# Patient Record
Sex: MALE | Race: WHITE | NOT HISPANIC OR LATINO | Employment: OTHER | URBAN - METROPOLITAN AREA
[De-identification: names, ages, dates, MRNs, and addresses within clinical notes are randomized per-mention and may not be internally consistent; named-entity substitution may affect disease eponyms.]

---

## 2017-10-18 ENCOUNTER — HOSPITAL ENCOUNTER (OUTPATIENT)
Facility: MEDICAL CENTER | Age: 62
End: 2017-10-18
Attending: INTERNAL MEDICINE
Payer: COMMERCIAL

## 2017-10-18 LAB
C DIFF DNA SPEC QL NAA+PROBE: NEGATIVE
C DIFF TOX GENS STL QL NAA+PROBE: NEGATIVE

## 2017-10-18 PROCEDURE — 87493 C DIFF AMPLIFIED PROBE: CPT

## 2017-10-23 ENCOUNTER — HOSPITAL ENCOUNTER (OUTPATIENT)
Dept: LAB | Facility: MEDICAL CENTER | Age: 62
End: 2017-10-23
Attending: INTERNAL MEDICINE
Payer: COMMERCIAL

## 2017-10-23 LAB
CRP SERPL HS-MCNC: 0.75 MG/DL (ref 0–0.75)
ERYTHROCYTE [DISTWIDTH] IN BLOOD BY AUTOMATED COUNT: 45.1 FL (ref 35.9–50)
ERYTHROCYTE [SEDIMENTATION RATE] IN BLOOD BY WESTERGREN METHOD: 22 MM/HOUR (ref 0–20)
HCT VFR BLD AUTO: 44.3 % (ref 42–52)
HGB BLD-MCNC: 14.7 G/DL (ref 14–18)
MCH RBC QN AUTO: 32.7 PG (ref 27–33)
MCHC RBC AUTO-ENTMCNC: 33.2 G/DL (ref 33.7–35.3)
MCV RBC AUTO: 98.4 FL (ref 81.4–97.8)
PLATELET # BLD AUTO: 199 K/UL (ref 164–446)
PMV BLD AUTO: 9.3 FL (ref 9–12.9)
RBC # BLD AUTO: 4.5 M/UL (ref 4.7–6.1)
WBC # BLD AUTO: 6.8 K/UL (ref 4.8–10.8)

## 2017-10-23 PROCEDURE — 86140 C-REACTIVE PROTEIN: CPT

## 2017-10-23 PROCEDURE — 36415 COLL VENOUS BLD VENIPUNCTURE: CPT

## 2017-10-23 PROCEDURE — 85652 RBC SED RATE AUTOMATED: CPT

## 2017-10-23 PROCEDURE — 85027 COMPLETE CBC AUTOMATED: CPT

## 2018-05-04 ENCOUNTER — HOSPITAL ENCOUNTER (OUTPATIENT)
Dept: LAB | Facility: MEDICAL CENTER | Age: 63
End: 2018-05-04
Attending: INTERNAL MEDICINE
Payer: COMMERCIAL

## 2018-05-04 LAB
BASOPHILS # BLD AUTO: 1.6 % (ref 0–1.8)
BASOPHILS # BLD: 0.15 K/UL (ref 0–0.12)
EOSINOPHIL # BLD AUTO: 1.96 K/UL (ref 0–0.51)
EOSINOPHIL NFR BLD: 20.9 % (ref 0–6.9)
ERYTHROCYTE [DISTWIDTH] IN BLOOD BY AUTOMATED COUNT: 51 FL (ref 35.9–50)
HCT VFR BLD AUTO: 38.6 % (ref 42–52)
HGB BLD-MCNC: 12 G/DL (ref 14–18)
IMM GRANULOCYTES # BLD AUTO: 0.07 K/UL (ref 0–0.11)
IMM GRANULOCYTES NFR BLD AUTO: 0.7 % (ref 0–0.9)
LYMPHOCYTES # BLD AUTO: 1.04 K/UL (ref 1–4.8)
LYMPHOCYTES NFR BLD: 11.1 % (ref 22–41)
MCH RBC QN AUTO: 30.5 PG (ref 27–33)
MCHC RBC AUTO-ENTMCNC: 31.1 G/DL (ref 33.7–35.3)
MCV RBC AUTO: 98 FL (ref 81.4–97.8)
MONOCYTES # BLD AUTO: 0.37 K/UL (ref 0–0.85)
MONOCYTES NFR BLD AUTO: 3.9 % (ref 0–13.4)
NEUTROPHILS # BLD AUTO: 5.8 K/UL (ref 1.82–7.42)
NEUTROPHILS NFR BLD: 61.8 % (ref 44–72)
NRBC # BLD AUTO: 0 K/UL
NRBC BLD-RTO: 0 /100 WBC
PLATELET # BLD AUTO: 298 K/UL (ref 164–446)
PMV BLD AUTO: 9.1 FL (ref 9–12.9)
RBC # BLD AUTO: 3.94 M/UL (ref 4.7–6.1)
WBC # BLD AUTO: 9.4 K/UL (ref 4.8–10.8)

## 2018-05-04 PROCEDURE — 85025 COMPLETE CBC W/AUTO DIFF WBC: CPT

## 2018-05-04 PROCEDURE — 36415 COLL VENOUS BLD VENIPUNCTURE: CPT

## 2019-07-15 ENCOUNTER — HOSPITAL ENCOUNTER (OUTPATIENT)
Dept: LAB | Facility: MEDICAL CENTER | Age: 64
End: 2019-07-15
Attending: INTERNAL MEDICINE
Payer: COMMERCIAL

## 2019-07-15 PROCEDURE — 82397 CHEMILUMINESCENT ASSAY: CPT

## 2019-07-15 PROCEDURE — 80299 QUANTITATIVE ASSAY DRUG: CPT

## 2019-07-15 PROCEDURE — 36415 COLL VENOUS BLD VENIPUNCTURE: CPT

## 2019-07-19 LAB
ADALIMUMAB NEUTRALIZING ANTIBODY Q5081: NORMAL
ADALIMUMAB SERPL-MCNC: NOT DETECTED UG/ML
PATHOLOGY STUDY: NORMAL

## 2019-08-02 ENCOUNTER — SPEECH THERAPY (OUTPATIENT)
Dept: SPEECH THERAPY | Facility: REHABILITATION | Age: 64
End: 2019-08-02
Attending: OTOLARYNGOLOGY
Payer: COMMERCIAL

## 2019-08-02 DIAGNOSIS — R49.0 DYSPHONIA: ICD-10-CM

## 2019-08-02 PROCEDURE — 92524 BEHAVRAL QUALIT ANALYS VOICE: CPT

## 2019-08-02 ASSESSMENT — ENCOUNTER SYMPTOMS: NO PATIENT REPORTED PAIN: 1

## 2019-08-02 NOTE — OP THERAPY EVALUATION
"  Outpatient Speech Therapy  INITIAL EVALUATION    [address]  [address]  [facility] [address]  Phone:  [phone]  Fax:  [phone]    Date of Evaluation: 08/02/2019    Patient: Mic Stroud  YOB: 1955  MRN: [id number]     Referring Provider: Teo Nagy M.D.  [address] [place] [address], [place] [address]   Referring Diagnosis Dysphonia [R49.0]     Time Calculation  Start time: 1500  Stop time: 1600 Time Calculation (min): 60 minutes       Speech Therapy Occurrence Codes    Date of Onset of Impairment:  1/1/18   Date speech therapy care plan established or reviewed:  8/2/19   Date speech therapy treatment started:  8/2/19          Chief Complaint: Other (changes to voice)    Visit Diagnoses     ICD-10-CM   1. Dysphonia R49.0     Subjective:   Reason for Therapy:     Reason For Evaluation:  Voice    Onset Date:  1/1/2018    Onset Description:  Patient 64 year old male referred to speech therapy by ENT secondary to patient experiencing changes to voice. Patient reports \"it always feels like there is phlegm there\" with patient reporting \"my whole family has the same thing where we have to clear our throat all the time.\"  Patient reports that ENT analysis of vocal fold function revealed \"they can't come together\" with patient reporting that ENT advised participation in speech therapy to address \"strengthening the vocal folds so they come together better.\" Patient reports long standing history of GERD \"probably since I was a teenager.\"   Social Support:     Patient Mental Status:  Alert and Responsive  Progress Factors:     Progression:  Getting Better  Pain:     no pain reported    Therapy History:     Current Diet:  Regular Diet, Normal Consistency and Thin Liquids    Hearing:  No Deficits    Vision:  Eye Glasses    Dentition:  Complete Dentition  Additional Subjective Comments:      Patient reports that voice \"feels rough, I feel like I want to cough all the time.\" " " Patient reports he \"likes to sing and I've lost the high end.\" Patient reports that he has \"periods of clear voicing\" but that periods of voicing \"are becoming less frequent.\"     Past Medical History:   Diagnosis Date   • Arrhythmia     reports occasional \"Fluttering\", states work up neg.    • Arthritis     knees   • Hiatus hernia syndrome    • High cholesterol    • Hypothyroid    • Sleep apnea     had a sleep study. doesn't use cpap   • Snoring      Past Surgical History:   Procedure Laterality Date   • KNEE ARTHROSCOPY  2/12/2015    Performed by Greg Chaves M.D. at [facility]ERY [facility]   • MEDIAL MENISCECTOMY  2/12/2015    Performed by Greg Chaves M.D. at [facility]   • KNEE ARTHROPLASTY TOTAL  2013    Knee Replacement, Total, left   • FLAP GRAFT  6/20/2012    Performed by GREG NAGY at [facility]   • FULL THICKNESS SKIN GRAFT  6/20/2012    Performed by GREG NAGY at [facility]   • PIN INSERTION  5/4/2012    Performed by GREG NAGY at [facility]   • NERVE REPAIR  5/4/2012    Performed by GREG NAGY at [facility]   • ARTERY EXPLORATION OR REPAIR  5/4/2012    Performed by GREG NAGY at [facility]   • LIGAMENT REPAIR  5/4/2012    Performed by GREG NAGY at [facility]     Objective:   Treatments/Interventions Performed:  Patient/Caregiver education  Other Treatment Interventions:  Portions of the [place] Assessment of Dysarthria 2 (FDA 2)/ Voice Handicap Index (VHI)  Treatment Intervention tool(s) used:  Laryngeal portion of the FDA 2 revealed (c) MODERATE deficits with voice best characterized as hoarse/ strained, husky, rough in quality/ tone with periods of increased breathiness.      Voice Handicap Index revealed:  Part 1- Function (21) Part 2- Physical (25) Part 3- Emotional (13) revealing a total score of 59 consistent with a severity rating of MODERATE. "     Speech Therapy Assessment:     Speech Mechanism Assessment:     Portions of the Other (FDA 2/ VHI) are used.    Patient voice description: Hoarse    Patient uses adequate breath support: Yes    Patient breath rate: Normal  Speech mechanism comments: Results of assessment of voice revealed that patient presented with moderate deficits in voicing with voice characterized as hoarse, strained/ rough with intermittent periods of increased breathiness. Patient reports that changes to voice have changes quality of life reporting that he no longer participates in a singing group because of changes to voice.     Speech Therapy Plan :   Prognosis & Recommendations  Impression Summary: Mic Stroud, a 64 year old male, participated in a voice evaluation.  Results of the evaluation revealed that patient presented with moderate deficits in voice with participation in direct, outpatient speech therapy to address deficits, promote vocal health through education regarding vocal hygiene and train compensatory strategies of voice to improve voice recommended and warranted.   Prognosis:  Excellent  Goals  Short Term Goals:  1.  Patient will state and implement vocal hygiene program as outlined during speech therapy sessions to promote voice health and function, 88% accuracy independent.  2. Patient will independently initiate replacement behaviors to throat clear 4/5 obligatory opportunities, and implement vocal fold relaxation techniques 80% accuracy independent.  3. Patient will participate in structured exercise addressing vocal fold strengthening/ adduction completing with 85% accuracy, independent.   Short Term Goal Duration (Weeks):  4-6 weeks  Long Term Goals:  1. Patient will improve voice to increase independence in communication with variety of listeners, across a variety of conversational environments to independent level.   Long Term Goal Duration (Weeks):  1-2 months  Therapy Recommendations  Recommendation:   Individual Speech Therapy and Other, Patient will participate in 3-4 follow up sessions of outpatient speech therapy.    1) Consider referral for formal assessment of vocal fold function through Stroboscopy.  2) Consultation with referring ENT as therapy progresses   Planned Therapy Interventions:  Home Program, Patient/Caregiver Education, Compensatory Strategy Training and Voice training,   Plan Details:  Patient will be seen on an every other week schedule to allow patient time to implement/ practice voice techniques learned in speech therapy sessions to determine progress.     4 units (68691)  Duration (in visits):  4  Duration (in weeks):  8      Functional Assessment Used  Portions of the [place] Dysarthria Assessment 2/ Voice Handicap Index    Referring provider co-signature:  I have reviewed this plan of care and my co-signature certifies the need for services.  Certification Dates:   From 08/02/19     To 09/27/19    Physician Signature: ________________________________ Date: ______________

## 2019-08-07 ENCOUNTER — SPEECH THERAPY (OUTPATIENT)
Dept: SPEECH THERAPY | Facility: REHABILITATION | Age: 64
End: 2019-08-07
Attending: OTOLARYNGOLOGY
Payer: COMMERCIAL

## 2019-08-07 DIAGNOSIS — R49.0 DYSPHONIA: ICD-10-CM

## 2019-08-07 PROCEDURE — 92507 TX SP LANG VOICE COMM INDIV: CPT

## 2019-08-07 NOTE — OP THERAPY DAILY TREATMENT
Outpatient Speech Therapy  DAILY TREATMENT     [address]  [address]  [place] [address]  Phone:  [phone]  Fax:  [phone]    Date: 08/07/2019    Patient: Mic Stroud  YOB: 1955  MRN: [id number]     Time Calculation  1345 to 1425 total 40 minutes    Chief Complaint: Hoarse (Worse today than yesterday)    Visit #: 2    Subjective:   Reason for Therapy:     Reason For Evaluation:  Voice  Social Support:     Patient Mental Status:  Alert and Responsive  Progress Factors:     Progression:  Getting Better    Aggravating Factors:  Prolonged Speaking    Alleviating Factors:  Hydration  Additional Subjective Comments:      Patient stated that his voice is worse today than it was yesterday.  Reported the breath hold has helped him manage thick phlegm without having to clear throat frequently.  Noted also that he is more aware about staying hydrated throughout the day and with less urges to clear his voice.  Stated his pitch is lower than his baseline.  Completing 85% of the HEP tasks daily.      Objective:   Treatments/Interventions Performed:  Home program, Patient/Caregiver education, Voice training and Respiratory Coordination / Support training  Objective Details:  Patient completed the yawn sigh and breath hold independently for 5 repetitions each.    Glottal lewis and hum with pitch shift up and down completed with min assist and 100% accuracy.  SLP reeducated on breath hold with a push to assist with stronger adduction as patient had not been completing with a push.  Glottal lewis held for 30 seconds.  Conversational tasks had minimal pitch breaks and slight instances of aphonia.          Speech Therapy Assessment:     Speech Mechanism Assessment:     Patient voice description: Hoarse (intermittently clear)  Speech mechanism comments: Patient educated on importance of breath support and how that aids with vocal productions being strong.  HEP tasks  were reported as being completed 10 times a day about 3-5 repetitions each time.  Patient required min cues to recall why vocal hygiene is important and why he is completing directed exercises.  Patient educated on recording himself weekly to hear improvements as it is difficult to self assess.      Speech Therapy Plan :   Prognosis & Recommendations  Impression Summary:  Patient responded well to therapy tasks.  He is motivated and a quick learner.  ST from evaluation stopped in room and reported that his voice was significantly better from when she had just seen him.   Prognosis:  Excellent  Therapy Recommendations  Recommendation:  Individual Speech Therapy,  Planned Therapy Interventions:  Voice training, Home Program, Patient/Caregiver Education and Self-care home management,   Plan Details:  Patient to assess tolerance of new exercises with ability to voice with improved quality and/or endurance.

## 2019-08-09 ENCOUNTER — APPOINTMENT (OUTPATIENT)
Dept: SPEECH THERAPY | Facility: REHABILITATION | Age: 64
End: 2019-08-09
Attending: OTOLARYNGOLOGY
Payer: COMMERCIAL

## 2019-08-14 ENCOUNTER — APPOINTMENT (OUTPATIENT)
Dept: SPEECH THERAPY | Facility: REHABILITATION | Age: 64
End: 2019-08-14
Attending: OTOLARYNGOLOGY
Payer: COMMERCIAL

## 2019-08-16 ENCOUNTER — APPOINTMENT (OUTPATIENT)
Dept: SPEECH THERAPY | Facility: REHABILITATION | Age: 64
End: 2019-08-16
Attending: OTOLARYNGOLOGY
Payer: COMMERCIAL

## 2019-08-21 ENCOUNTER — SPEECH THERAPY (OUTPATIENT)
Dept: SPEECH THERAPY | Facility: REHABILITATION | Age: 64
End: 2019-08-21
Attending: OTOLARYNGOLOGY
Payer: COMMERCIAL

## 2019-08-21 DIAGNOSIS — R49.0 DYSPHONIA: ICD-10-CM

## 2019-08-21 PROCEDURE — 92507 TX SP LANG VOICE COMM INDIV: CPT

## 2019-08-23 ENCOUNTER — APPOINTMENT (OUTPATIENT)
Dept: SPEECH THERAPY | Facility: REHABILITATION | Age: 64
End: 2019-08-23
Attending: OTOLARYNGOLOGY
Payer: COMMERCIAL

## 2019-08-28 ENCOUNTER — APPOINTMENT (OUTPATIENT)
Dept: SPEECH THERAPY | Facility: REHABILITATION | Age: 64
End: 2019-08-28
Attending: OTOLARYNGOLOGY
Payer: COMMERCIAL

## 2019-09-04 ENCOUNTER — SPEECH THERAPY (OUTPATIENT)
Dept: SPEECH THERAPY | Facility: REHABILITATION | Age: 64
End: 2019-09-04
Attending: OTOLARYNGOLOGY
Payer: COMMERCIAL

## 2019-09-04 DIAGNOSIS — R49.0 DYSPHONIA: ICD-10-CM

## 2019-09-04 PROCEDURE — 92507 TX SP LANG VOICE COMM INDIV: CPT

## 2019-09-04 NOTE — OP THERAPY DAILY TREATMENT
"  Outpatient Speech Therapy  DAILY TREATMENT     [facility] Speech [address]  [address]  [place] [address]  Phone:  [phone]  Fax:  [phone]    Date: 09/04/2019    Patient: Mic Stroud  YOB: 1955  MRN: [id number]     Time Calculation  1236 to 1312  36 minutes    Chief Complaint: Hoarse    Visit #: 4    Subjective:   Reason for Therapy:     Reason For Evaluation:  Voice  Social Support:     Patient Mental Status:  Alert and Responsive  Progress Factors:     Progression:  Getting Better    Aggravating Factors:  Dehydration    Alleviating Factors:  Vocal Rest and Hydration  Additional Subjective Comments:      Patient reported that voice is getting better and for almost two weeks had normal voicing.  Reported that last couple of night he had been sleeping in his back and noted his mouth was open.  Wasn't sure if that was contributing to his hoarse voice today.  Stated that he gets discouraged when voice is hoarse despite having increased success.  Has not been completing HEP tasks as he had been doing so well and said \"out of sight out of mind.\"  Has been able to sing last two weekends with [employer] group with no vocal issues and increased endurance and clarity.  Staying hydrated with 80 ounces of water,1 cup of coffee, and non-caffeine sodas.   Reported all the tools that were given were helpful in his recovery and is now ready for discharge.        Objective:   Treatments/Interventions Performed:  Patient/Caregiver education, Home program and Voice training  Objective Details:  Patient sustained \"ah\" for 21 seconds and \"e\" for 17 seconds.  Noted trembling in voice intermittently.  After sustained \"e\" required cues to have a drink as began throat clearing.  Did not want to complete pitch glides at this time a she felt uncomfortable in this setting.  Patient talked back strategies of breath hold and liquid wash before clearing throat.  Patient stated 2-3 times a day " he is only clearing throat now, if at all.    Patient verbalized importance of vocal hygiene and benefits to improved vocal quality.         Speech Therapy Assessment:     Speech Mechanism Assessment:     Patient voice description: Hoarse    Patient uses adequate breath support: Yes    Patient breath rate: Normal  Speech mechanism comments: Patient reporting that he is connecting when he is talking and singing with use of diaphragm versus use of his throat. He verbalized how he is attempting to relax more and implement use of diaphragm more with vocalizations.  Attending more to when feeling strained versus relaxed.  Improved breath control and vocal quality in sustained sounds.  Educated on possibility of using humidifier to decrease dryness while sleeping.  Educated on lozenges throughout the day to decrease xerostomia as well.  Patient directed to implement a journal of when he is discouraged by his vocal quality if it due to sleeping habits.   SLP taught to increase loudness with singing and complete more frequently to increase stamina as he stated his voice taps out at about an hour of signing.      Speech Therapy Plan :   Prognosis & Recommendations  Impression Summary: Mic Stroud, a 64 year old male has demonstrated improved vocal quality with use of strategies and exercises provided. He has responded well to therapy, but does have slight instances where vocal quality does go hoarse and interfere with his quality of life.  Prognosis:  Excellent  Goal Comments:   1.  Patient will state and implement vocal hygiene program as outlined during speech therapy sessions to promote voice health and function, 88% accuracy independent. MET  2. Patient will independently initiate replacement behaviors to throat clear 4/5 obligatory opportunities, and implement vocal fold relaxation techniques 80% accuracy independent. MET  3. Patient will participate in structured exercise addressing vocal fold strengthening/ adduction  completing with 85% accuracy, independent. MET  4. Patient will improve voice to increase independence in communication with variety of listeners, across a variety of conversational environments to independent level. MET  Therapy Recommendations  Recommendation:  No further speech therapy indicated at this time, Patient to follow up with ENT to reassess vocal adduction and/or have stroboscopy completed to assess vocal fold movement if necessary.  Plan Details:  Patient discharged with understanding to continue with HEP and strategies for improved vocal quality.

## 2019-09-04 NOTE — OP THERAPY DISCHARGE SUMMARY
"  Outpatient Speech Therapy  DISCHARGE SUMMARY NOTE      [facility] Speech Therapy [facility]  [address]  [address]  [facility] [address]  Phone:  [phone]  Fax:  [phone]    Date of Visit: 09/04/2019    Patient: Mic Stroud  YOB: 1955  MRN: [id number]     Referring Provider: Teo aNgy M.D.  [address] [place] [address], [place] [address]   Referring Diagnosis: Dysphonia [R49.0]     Visit #: 4    Time Calculation  1236 to 1312      36 minutes    Speech Therapy Occurrence Codes    Date of Onset of Impairment:  1/1/18   Date speech therapy care plan established or reviewed:  8/2/19   Date speech therapy treatment started:  8/2/19          Chief Complaint: Hoarse    Visit Diagnoses     ICD-10-CM   1. Dysphonia R49.0       Subjective Evaluation   Patient reported that voice is getting better and for almost two weeks had normal voicing.  Reported that last couple of night he had been sleeping in his back and noted his mouth was open.  Wasn't sure if that was contributing to his hoarse voice today.  Stated that he gets discouraged when voice is hoarse despite having increased success.  Has not been completing HEP tasks as he had been doing so well and said \"out of sight out of mind.\"  Has been able to sing last two weekends with [employer] group with no vocal issues and increased endurance and clarity.  Staying hydrated with 80 ounces of water,1 cup of coffee, and non-caffeine sodas.   Reported all the tools that were given were helpful in his recovery and is now ready for discharge.    Objective:   Treatments/Interventions Performed:  Patient/Caregiver education, Voice training and Home program  Objective Details:  Patient sustained \"ah\" for 21 seconds and \"e\" for 17 seconds.  Noted trembling in voice intermittently.  After sustained \"e\" required cues to have a drink as began throat clearing.  Did not want to complete pitch glides at this time a she felt uncomfortable in this setting.  Patient talked " back strategies of breath hold and liquid wash before clearing throat.  Patient stated 2-3 times a day he is only clearing throat now, if at all.    Patient verbalized importance of vocal hygiene and benefits to improved vocal quality.      Assessments   Patient voice description: Hoarse    Patient uses adequate breath support: Yes    Patient breath rate: Normal  Speech mechanism comments: Patient reporting that he is connecting when he is talking and singing with use of diaphragm versus use of his throat. He verbalized how he is attempting to relax more and implement use of diaphragm more with vocalizations.  Attending more to when feeling strained versus relaxed.  Improved breath control and vocal quality in sustained sounds.  Educated on possibility of using humidifier to decrease dryness while sleeping.  Educated on lozenges throughout the day to decrease xerostomia as well.  Patient directed to implement a journal of when he is discouraged by his vocal quality if it due to sleeping habits.   SLP taught to increase loudness with singing and complete more frequently to increase stamina as he stated his voice taps out at about an hour of signing.  Speech Therapy Plan  Prognosis & Recommendations  Impression Summary: Mic Stroud, a 64 year old male has demonstrated improved vocal quality with use of strategies and exercises provided. He has responded well to therapy, but does have slight instances where vocal quality does go hoarse and interfere with his quality of life.  Prognosis:  Excellent  Goal Comments:   1.  Patient will state and implement vocal hygiene program as outlined during speech therapy sessions to promote voice health and function, 88% accuracy independent. MET  2. Patient will independently initiate replacement behaviors to throat clear 4/5 obligatory opportunities, and implement vocal fold relaxation techniques 80% accuracy independent. MET  3. Patient will participate in structured exercise  addressing vocal fold strengthening/ adduction completing with 85% accuracy, independent. MET  4. Patient will improve voice to increase independence in communication with variety of listeners, across a variety of conversational environments to independent level. MET  Therapy Recommendations  Recommendation:  No further speech therapy indicated at this time, Patient to follow up with ENT to reassess vocal adduction and/or have stroboscopy completed to assess vocal fold movement if necessary.  Plan Details:  Patient discharged with understanding to continue with HEP and strategies for improved vocal quality.

## 2019-12-03 ENCOUNTER — TELEPHONE (OUTPATIENT)
Dept: CARDIOLOGY | Facility: MEDICAL CENTER | Age: 64
End: 2019-12-03

## 2019-12-03 NOTE — TELEPHONE ENCOUNTER
Called patient to see if he has followed with another cardiologist besides Dr. Medina in 2015. He states that he has not seen another cardiologist and does not recall undergoing any cardiac testing previously. Confirmed appt with him -KACEY

## 2019-12-16 ENCOUNTER — OFFICE VISIT (OUTPATIENT)
Dept: CARDIOLOGY | Facility: MEDICAL CENTER | Age: 64
End: 2019-12-16
Payer: COMMERCIAL

## 2019-12-16 VITALS
SYSTOLIC BLOOD PRESSURE: 98 MMHG | OXYGEN SATURATION: 97 % | DIASTOLIC BLOOD PRESSURE: 64 MMHG | HEIGHT: 74 IN | HEART RATE: 90 BPM | WEIGHT: 163 LBS | BODY MASS INDEX: 20.92 KG/M2

## 2019-12-16 DIAGNOSIS — R00.0 TACHYCARDIA: ICD-10-CM

## 2019-12-16 DIAGNOSIS — R60.0 LEG EDEMA, LEFT: ICD-10-CM

## 2019-12-16 PROBLEM — E03.9 HYPOTHYROIDISM: Status: ACTIVE | Noted: 2019-12-16

## 2019-12-16 PROBLEM — R00.2 PALPITATIONS: Status: ACTIVE | Noted: 2019-12-16

## 2019-12-16 LAB — EKG IMPRESSION: NORMAL

## 2019-12-16 PROCEDURE — 93000 ELECTROCARDIOGRAM COMPLETE: CPT | Performed by: INTERNAL MEDICINE

## 2019-12-16 PROCEDURE — 99204 OFFICE O/P NEW MOD 45 MIN: CPT | Performed by: INTERNAL MEDICINE

## 2019-12-16 RX ORDER — BUDESONIDE 9 MG/1
9 TABLET, EXTENDED RELEASE ORAL DAILY
Status: ON HOLD | COMMUNITY
Start: 2019-12-11 | End: 2020-06-11

## 2019-12-16 ASSESSMENT — ENCOUNTER SYMPTOMS
PALPITATIONS: 0
CHILLS: 1
HEARTBURN: 1
WEIGHT LOSS: 1
SHORTNESS OF BREATH: 0
NAUSEA: 1
DIARRHEA: 1
WHEEZING: 1

## 2019-12-16 NOTE — PROGRESS NOTES
"Chief Complaint   Patient presents with   •    • Edema     LE       Subjective:   Mic Stroud is a 64 y.o. male who presents today for leg edema    He is seen in consultation at request of Dr. Almaguer for above issue.  He has history of hypothyroidism, dyslipidemia but no major cardiac issues.  He was seen here 4 years ago for palpitations but cardiac monitor was unremarkable.   He also had had ulcerative colitis for a few years. It became much worse this summer despite biologic therapy. He was almost bed ridden and was even referred to [facility].  About a month ago, he developed swelling in his his left leg.  Reportedly venous duple did not show DVT.  He also did receive injection of the left foot with the partially improve the foot pain.  According to him, abumin was exremely low.    C/o feeling weak but denies chest pain, palpitations or other heart failure symptoms.  Heart rate has been running in the 90s.  He thinks he is getting slightly stronger but may need colectomy in the near future.       Past Medical History:   Diagnosis Date   • Arrhythmia     reports occasional \"Fluttering\", states work up neg.    • Arthritis     knees   • Hiatus hernia syndrome    • High cholesterol    • Hypothyroid    • Sleep apnea     had a sleep study. doesn't use cpap   • Snoring      Past Surgical History:   Procedure Laterality Date   • KNEE ARTHROSCOPY  2/12/2015    Performed by Greg Chaves M.D. at SURGERY [facility]   • MEDIAL MENISCECTOMY  2/12/2015    Performed by Greg Chaves M.D. at [facility]   • KNEE ARTHROPLASTY TOTAL  2013    Knee Replacement, Total, left   • FLAP GRAFT  6/20/2012    Performed by GREG NAGY at [facility]   • FULL THICKNESS SKIN GRAFT  6/20/2012    Performed by GREG NAGY at [facility]   • PIN INSERTION  5/4/2012    Performed by GREG NAGY at [facility]   • NERVE REPAIR  5/4/2012    Performed by " EDWARD NAGY at SURGERY [facility]   • ARTERY EXPLORATION OR REPAIR  5/4/2012    Performed by EDWARD NAGY at SURGERY [facility]   • LIGAMENT REPAIR  5/4/2012    Performed by EDWARD NAGY at SURGERY [facility]     History reviewed. No pertinent family history.  Social History     Socioeconomic History   • Marital status:      Spouse name: Not on file   • Number of children: Not on file   • Years of education: Not on file   • Highest education level: Not on file   Occupational History   • Not on file   Social Needs   • Financial resource strain: Not on file   • Food insecurity:     Worry: Not on file     Inability: Not on file   • Transportation needs:     Medical: Not on file     Non-medical: Not on file   Tobacco Use   • Smoking status: Never Smoker   • Smokeless tobacco: Never Used   Substance and Sexual Activity   • Alcohol use: No   • Drug use: No   • Sexual activity: Not on file   Lifestyle   • Physical activity:     Days per week: Not on file     Minutes per session: Not on file   • Stress: Not on file   Relationships   • Social connections:     Talks on phone: Not on file     Gets together: Not on file     Attends Anabaptism service: Not on file     Active member of club or organization: Not on file     Attends meetings of clubs or organizations: Not on file     Relationship status: Not on file   • Intimate partner violence:     Fear of current or ex partner: Not on file     Emotionally abused: Not on file     Physically abused: Not on file     Forced sexual activity: Not on file   Other Topics Concern   • Not on file   Social History Narrative   • Not on file     Allergies   Allergen Reactions   • Pcn [Penicillins] Rash     .   • Sulfa Drugs Unspecified     Mouth sores, swelling tongue, loss of taste     Outpatient Encounter Medications as of 12/16/2019   Medication Sig Dispense Refill   • UCERIS 9 MG TABLET SR 24 HR Take 9 mg by mouth every day.     • levothyroxine  "(SYNTHROID) 150 MCG TABS Take 150 mcg by mouth every morning.     • atorvastatin (LIPITOR) 10 MG TABS Take 10 mg by mouth every morning.     • lansoprazole (PREVACID) 30 MG CPDR Take 30 mg by mouth every morning. Indications: Gastroesophageal Reflux Disease     • Omega-3 Fatty Acids (FISH OIL) 1000 MG CAPS capsule Take 1,000 mg by mouth every morning.     • Cholecalciferol (VITAMIN D3) 2000 UNIT CAPS Take 1 Cap by mouth every morning.     • Fexofenadine HCl (ALLERGY 24-HR PO) Take  by mouth.       No facility-administered encounter medications on file as of 12/16/2019.      Review of Systems   Constitutional: Positive for chills, malaise/fatigue and weight loss.   Respiratory: Positive for wheezing. Negative for shortness of breath.    Cardiovascular: Negative for chest pain and palpitations.   Gastrointestinal: Positive for diarrhea, heartburn and nausea.        From ulcerative colitis  Took Entivia for 3 months from August to October   Musculoskeletal: Positive for joint pain.        Worse in his left foot and ankle   Skin: Negative for itching and rash.   All other systems reviewed and are negative.       Objective:   BP (!) 98/64 (BP Location: Left arm, Patient Position: Sitting, BP Cuff Size: Adult)   Pulse 90   Ht 1.88 m (6' 2\")   Wt 73.9 kg (163 lb)   SpO2 97%   BMI 20.93 kg/m²     Physical Exam   Constitutional: He is oriented to person, place, and time. He appears well-developed and well-nourished.   HENT:   Head: Atraumatic.   Eyes: Left eye exhibits no discharge.   Neck: No JVD present.   Cardiovascular: Normal rate and regular rhythm. Exam reveals no gallop.   No murmur heard.  Pulmonary/Chest: Effort normal and breath sounds normal. No respiratory distress. He has no wheezes. He has no rales.   Abdominal: Soft. He exhibits no distension. There is no tenderness.   Musculoskeletal:         General: Edema present.      Comments: Trace edema   Neurological: He is alert and oriented to person, place, " and time.   Skin: Skin is warm and dry. No erythema.   Psychiatric: He has a normal mood and affect. His behavior is normal.     EKG today by my review shows sinus rhythm with rightward axis but otherwise normal    Assessment:     1. Leg edema, left     2. Tachycardia         Medical Decision Making:  Today's Assessment / Status / Plan:     I had a long discussion with him today.  I believe that his edema is unlikely to be from cardiac origin.  With history of prolonged bedrest and localized edema, this could be from venous stasis.    As mentioned above he also reported arthralgia in the left foot and hypoalbuminemia.   In terms of his concerned about his recent higher resting heart rate, I informed him that this is likely in part due to deconditioning. He also stated his thyroid supplementation has been recently reduced.    Given the fact he has been receiving a number of immunologic therapy and upcoming colectomy we decided to obtain echocardiography to ensure that his cardiac function remained in range.  I did not start him on a new medication.  We will keep you posted on our finding and further recommendation if they become available.  If echocardiography is unremarkable, we will see him back on an as-needed basis.  Thank you for allowing us to participate in the care of this patient.

## 2019-12-30 ENCOUNTER — HOSPITAL ENCOUNTER (OUTPATIENT)
Dept: CARDIOLOGY | Facility: MEDICAL CENTER | Age: 64
End: 2019-12-30
Attending: INTERNAL MEDICINE
Payer: COMMERCIAL

## 2019-12-30 DIAGNOSIS — R00.0 TACHYCARDIA: ICD-10-CM

## 2019-12-30 DIAGNOSIS — R60.0 LEG EDEMA, LEFT: ICD-10-CM

## 2019-12-30 PROCEDURE — 93306 TTE W/DOPPLER COMPLETE: CPT

## 2019-12-31 LAB
LV EJECT FRACT  99904: 65
LV EJECT FRACT MOD 2C 99903: 74.59
LV EJECT FRACT MOD 4C 99902: 43.2
LV EJECT FRACT MOD BP 99901: 63.62

## 2019-12-31 PROCEDURE — 93306 TTE W/DOPPLER COMPLETE: CPT | Mod: 26 | Performed by: INTERNAL MEDICINE

## 2020-01-02 ENCOUNTER — TELEPHONE (OUTPATIENT)
Dept: CARDIOLOGY | Facility: MEDICAL CENTER | Age: 65
End: 2020-01-02

## 2020-01-02 NOTE — TELEPHONE ENCOUNTER
Result Notes for EC-ECHOCARDIOGRAM COMPLETE W/O CONT  Message Received: 2 days ago Message Contents   GODWIN Stevenson R.N.     ECHO [facility]      Letter printed with MD recommendations and mailed to pt mailing address.

## 2020-05-15 ENCOUNTER — OUTPATIENT INFUSION SERVICES (OUTPATIENT)
Dept: ONCOLOGY | Facility: MEDICAL CENTER | Age: 65
End: 2020-05-15
Attending: INTERNAL MEDICINE
Payer: COMMERCIAL

## 2020-05-15 VITALS
SYSTOLIC BLOOD PRESSURE: 107 MMHG | BODY MASS INDEX: 22.18 KG/M2 | HEART RATE: 80 BPM | RESPIRATION RATE: 18 BRPM | TEMPERATURE: 98.8 F | DIASTOLIC BLOOD PRESSURE: 67 MMHG | OXYGEN SATURATION: 100 % | WEIGHT: 167.33 LBS | HEIGHT: 73 IN

## 2020-05-15 DIAGNOSIS — R71.0 HEMOGLOBIN DECREASED: ICD-10-CM

## 2020-05-15 LAB
ABO + RH BLD: NORMAL
ABO GROUP BLD: NORMAL
BARCODED ABORH UBTYP: 8400
BARCODED ABORH UBTYP: 8400
BARCODED PRD CODE UBPRD: NORMAL
BARCODED PRD CODE UBPRD: NORMAL
BARCODED UNIT NUM UBUNT: NORMAL
BARCODED UNIT NUM UBUNT: NORMAL
BLD GP AB SCN SERPL QL: NORMAL
COMPONENT R 8504R: NORMAL
COMPONENT R 8504R: NORMAL
PRODUCT TYPE UPROD: NORMAL
PRODUCT TYPE UPROD: NORMAL
RH BLD: NORMAL
UNIT STATUS USTAT: NORMAL
UNIT STATUS USTAT: NORMAL

## 2020-05-15 PROCEDURE — P9016 RBC LEUKOCYTES REDUCED: HCPCS

## 2020-05-15 PROCEDURE — 306780 HCHG STAT FOR TRANSFUSION PER CASE

## 2020-05-15 PROCEDURE — 86900 BLOOD TYPING SEROLOGIC ABO: CPT

## 2020-05-15 PROCEDURE — 96375 TX/PRO/DX INJ NEW DRUG ADDON: CPT

## 2020-05-15 PROCEDURE — 86850 RBC ANTIBODY SCREEN: CPT

## 2020-05-15 PROCEDURE — A9270 NON-COVERED ITEM OR SERVICE: HCPCS | Performed by: INTERNAL MEDICINE

## 2020-05-15 PROCEDURE — 36430 TRANSFUSION BLD/BLD COMPNT: CPT

## 2020-05-15 PROCEDURE — 86923 COMPATIBILITY TEST ELECTRIC: CPT

## 2020-05-15 PROCEDURE — 700102 HCHG RX REV CODE 250 W/ 637 OVERRIDE(OP): Performed by: INTERNAL MEDICINE

## 2020-05-15 PROCEDURE — 36415 COLL VENOUS BLD VENIPUNCTURE: CPT

## 2020-05-15 PROCEDURE — 96374 THER/PROPH/DIAG INJ IV PUSH: CPT

## 2020-05-15 PROCEDURE — 700111 HCHG RX REV CODE 636 W/ 250 OVERRIDE (IP): Performed by: INTERNAL MEDICINE

## 2020-05-15 PROCEDURE — 86901 BLOOD TYPING SEROLOGIC RH(D): CPT

## 2020-05-15 RX ORDER — MULTIVITAMIN
1 TABLET ORAL
COMMUNITY
End: 2020-06-22

## 2020-05-15 RX ORDER — ACETAMINOPHEN 325 MG/1
650 TABLET ORAL ONCE
Status: COMPLETED | OUTPATIENT
Start: 2020-05-15 | End: 2020-05-15

## 2020-05-15 RX ADMIN — DIPHENHYDRAMINE HYDROCHLORIDE 25 MG: 50 INJECTION INTRAMUSCULAR; INTRAVENOUS at 18:45

## 2020-05-15 RX ADMIN — ACETAMINOPHEN 650 MG: 325 TABLET, FILM COATED ORAL at 18:45

## 2020-05-16 NOTE — PROGRESS NOTES
This RN noticed that patient was breathing fast and was putting a lot of blankets on. Temp taken. 103.5. NO other symptoms. Call to bry SMITH at this time. Infusion stopped.

## 2020-05-16 NOTE — PROGRESS NOTES
Mic arrives for blood transfusion today. Mic c/o fatigue, but denies shortness of breath and chest pain. CBC done prior shows Hct 22.9%, order states to transfuse 2 units for Hct less than 24%. Blood transfusion consents signed. Potential side effects reviewed. PIV started, COD drawn and sent TO blood bank. 1 units PRBC's transfused without incident. Mic tolerated well. No signs or symptoms of adverse reaction observed or expressed. Initiated second unit of PRBCs, then report given to Magdalena ALMONTE during transfusion of second unit. Mic rested comfortably prior to and during report. Patient states he has no further questions at this time. Blood plan is one time order, no further appointments needed at this time.

## 2020-05-16 NOTE — PROGRESS NOTES
Tolerated infusion with no further reactions. PIV flushed and removed with tip intact. Site covered with pressure dressing. Discharged in no distress. No next appointment. One time appointment.

## 2020-06-10 ENCOUNTER — APPOINTMENT (OUTPATIENT)
Dept: RADIOLOGY | Facility: MEDICAL CENTER | Age: 65
DRG: 176 | End: 2020-06-10
Attending: EMERGENCY MEDICINE
Payer: COMMERCIAL

## 2020-06-10 ENCOUNTER — HOSPITAL ENCOUNTER (INPATIENT)
Facility: MEDICAL CENTER | Age: 65
LOS: 1 days | DRG: 176 | End: 2020-06-12
Attending: EMERGENCY MEDICINE | Admitting: INTERNAL MEDICINE
Payer: COMMERCIAL

## 2020-06-10 DIAGNOSIS — I26.99 ACUTE PULMONARY EMBOLISM, UNSPECIFIED PULMONARY EMBOLISM TYPE, UNSPECIFIED WHETHER ACUTE COR PULMONALE PRESENT (HCC): ICD-10-CM

## 2020-06-10 LAB
ALBUMIN SERPL BCP-MCNC: 3.6 G/DL (ref 3.2–4.9)
ALBUMIN/GLOB SERPL: 0.9 G/DL
ALP SERPL-CCNC: 57 U/L (ref 30–99)
ALT SERPL-CCNC: 15 U/L (ref 2–50)
ANION GAP SERPL CALC-SCNC: 13 MMOL/L (ref 7–16)
ANISOCYTOSIS BLD QL SMEAR: ABNORMAL
APPEARANCE UR: CLEAR
AST SERPL-CCNC: 18 U/L (ref 12–45)
BACTERIA #/AREA URNS HPF: NEGATIVE /HPF
BASOPHILS # BLD AUTO: 1.1 % (ref 0–1.8)
BASOPHILS # BLD: 0.04 K/UL (ref 0–0.12)
BILIRUB SERPL-MCNC: 0.7 MG/DL (ref 0.1–1.5)
BILIRUB UR QL STRIP.AUTO: NEGATIVE
BUN SERPL-MCNC: 11 MG/DL (ref 8–22)
CALCIUM SERPL-MCNC: 9 MG/DL (ref 8.5–10.5)
CHLORIDE SERPL-SCNC: 98 MMOL/L (ref 96–112)
CO2 SERPL-SCNC: 26 MMOL/L (ref 20–33)
COLOR UR: YELLOW
COMMENT 1642: NORMAL
CREAT SERPL-MCNC: 0.89 MG/DL (ref 0.5–1.4)
D DIMER PPP IA.FEU-MCNC: 3.32 UG/ML (FEU) (ref 0–0.5)
EOSINOPHIL # BLD AUTO: 0.11 K/UL (ref 0–0.51)
EOSINOPHIL NFR BLD: 3 % (ref 0–6.9)
EPI CELLS #/AREA URNS HPF: NEGATIVE /HPF
ERYTHROCYTE [DISTWIDTH] IN BLOOD BY AUTOMATED COUNT: 86.5 FL (ref 35.9–50)
GLOBULIN SER CALC-MCNC: 3.8 G/DL (ref 1.9–3.5)
GLUCOSE SERPL-MCNC: 99 MG/DL (ref 65–99)
GLUCOSE UR STRIP.AUTO-MCNC: NEGATIVE MG/DL
HCT VFR BLD AUTO: 30 % (ref 42–52)
HGB BLD-MCNC: 9.5 G/DL (ref 14–18)
HYALINE CASTS #/AREA URNS LPF: ABNORMAL /LPF
IMM GRANULOCYTES # BLD AUTO: 0.03 K/UL (ref 0–0.11)
IMM GRANULOCYTES NFR BLD AUTO: 0.8 % (ref 0–0.9)
KETONES UR STRIP.AUTO-MCNC: NEGATIVE MG/DL
LEUKOCYTE ESTERASE UR QL STRIP.AUTO: NEGATIVE
LIPASE SERPL-CCNC: 20 U/L (ref 11–82)
LYMPHOCYTES # BLD AUTO: 0.36 K/UL (ref 1–4.8)
LYMPHOCYTES NFR BLD: 9.9 % (ref 22–41)
MACROCYTES BLD QL SMEAR: ABNORMAL
MCH RBC QN AUTO: 36.3 PG (ref 27–33)
MCHC RBC AUTO-ENTMCNC: 31.7 G/DL (ref 33.7–35.3)
MCV RBC AUTO: 114.5 FL (ref 81.4–97.8)
MICRO URNS: ABNORMAL
MONOCYTES # BLD AUTO: 0.37 K/UL (ref 0–0.85)
MONOCYTES NFR BLD AUTO: 10.2 % (ref 0–13.4)
MORPHOLOGY BLD-IMP: NORMAL
NEUTROPHILS # BLD AUTO: 2.71 K/UL (ref 1.82–7.42)
NEUTROPHILS NFR BLD: 75 % (ref 44–72)
NITRITE UR QL STRIP.AUTO: NEGATIVE
NRBC # BLD AUTO: 0 K/UL
NRBC BLD-RTO: 0 /100 WBC
OVALOCYTES BLD QL SMEAR: NORMAL
PH UR STRIP.AUTO: 6 [PH] (ref 5–8)
PLATELET # BLD AUTO: 193 K/UL (ref 164–446)
PLATELET BLD QL SMEAR: NORMAL
PMV BLD AUTO: 9 FL (ref 9–12.9)
POIKILOCYTOSIS BLD QL SMEAR: NORMAL
POTASSIUM SERPL-SCNC: 4 MMOL/L (ref 3.6–5.5)
PROT SERPL-MCNC: 7.4 G/DL (ref 6–8.2)
PROT UR QL STRIP: NEGATIVE MG/DL
RBC # BLD AUTO: 2.62 M/UL (ref 4.7–6.1)
RBC # URNS HPF: ABNORMAL /HPF
RBC BLD AUTO: PRESENT
RBC UR QL AUTO: ABNORMAL
SODIUM SERPL-SCNC: 137 MMOL/L (ref 135–145)
SP GR UR STRIP.AUTO: 1.01
UROBILINOGEN UR STRIP.AUTO-MCNC: 0.2 MG/DL
WBC # BLD AUTO: 3.6 K/UL (ref 4.8–10.8)
WBC #/AREA URNS HPF: ABNORMAL /HPF

## 2020-06-10 PROCEDURE — 81001 URINALYSIS AUTO W/SCOPE: CPT

## 2020-06-10 PROCEDURE — 80053 COMPREHEN METABOLIC PANEL: CPT

## 2020-06-10 PROCEDURE — 83690 ASSAY OF LIPASE: CPT

## 2020-06-10 PROCEDURE — 99285 EMERGENCY DEPT VISIT HI MDM: CPT

## 2020-06-10 PROCEDURE — 85025 COMPLETE CBC W/AUTO DIFF WBC: CPT

## 2020-06-10 PROCEDURE — 93005 ELECTROCARDIOGRAM TRACING: CPT | Performed by: EMERGENCY MEDICINE

## 2020-06-10 PROCEDURE — 85379 FIBRIN DEGRADATION QUANT: CPT

## 2020-06-10 PROCEDURE — 87040 BLOOD CULTURE FOR BACTERIA: CPT

## 2020-06-11 ENCOUNTER — APPOINTMENT (OUTPATIENT)
Dept: CARDIOLOGY | Facility: MEDICAL CENTER | Age: 65
DRG: 176 | End: 2020-06-11
Attending: STUDENT IN AN ORGANIZED HEALTH CARE EDUCATION/TRAINING PROGRAM
Payer: COMMERCIAL

## 2020-06-11 PROBLEM — D53.9 MACROCYTIC ANEMIA: Status: ACTIVE | Noted: 2020-06-11

## 2020-06-11 PROBLEM — K51.90 ULCERATIVE COLITIS (HCC): Status: ACTIVE | Noted: 2020-06-11

## 2020-06-11 PROBLEM — I26.99 ACUTE PULMONARY EMBOLISM WITHOUT ACUTE COR PULMONALE (HCC): Status: ACTIVE | Noted: 2020-06-11

## 2020-06-11 LAB
ANION GAP SERPL CALC-SCNC: 12 MMOL/L (ref 7–16)
BUN SERPL-MCNC: 8 MG/DL (ref 8–22)
CALCIUM SERPL-MCNC: 8.6 MG/DL (ref 8.5–10.5)
CHLORIDE SERPL-SCNC: 102 MMOL/L (ref 96–112)
CO2 SERPL-SCNC: 25 MMOL/L (ref 20–33)
COVID ORDER STATUS COVID19: NORMAL
CREAT SERPL-MCNC: 0.76 MG/DL (ref 0.5–1.4)
CRP SERPL HS-MCNC: 7.39 MG/DL (ref 0–0.75)
EKG IMPRESSION: NORMAL
ERYTHROCYTE [DISTWIDTH] IN BLOOD BY AUTOMATED COUNT: 86.7 FL (ref 35.9–50)
ERYTHROCYTE [SEDIMENTATION RATE] IN BLOOD BY WESTERGREN METHOD: 90 MM/HOUR (ref 0–20)
FERRITIN SERPL-MCNC: 59.9 NG/ML (ref 22–322)
FOLATE SERPL-MCNC: 19.4 NG/ML
GLUCOSE SERPL-MCNC: 89 MG/DL (ref 65–99)
HCT VFR BLD AUTO: 27.1 % (ref 42–52)
HEMOCCULT STL QL: POSITIVE
HGB BLD-MCNC: 8.7 G/DL (ref 14–18)
IRON SATN MFR SERPL: 22 % (ref 15–55)
IRON SERPL-MCNC: 42 UG/DL (ref 50–180)
LV EJECT FRACT  99904: 65
LV EJECT FRACT MOD 2C 99903: 65.43
LV EJECT FRACT MOD 4C 99902: 66.77
LV EJECT FRACT MOD BP 99901: 66.5
MCH RBC QN AUTO: 36.3 PG (ref 27–33)
MCHC RBC AUTO-ENTMCNC: 32.1 G/DL (ref 33.7–35.3)
MCV RBC AUTO: 112.9 FL (ref 81.4–97.8)
NT-PROBNP SERPL IA-MCNC: 125 PG/ML (ref 0–125)
PLATELET # BLD AUTO: 206 K/UL (ref 164–446)
PMV BLD AUTO: 9.8 FL (ref 9–12.9)
POTASSIUM SERPL-SCNC: 4 MMOL/L (ref 3.6–5.5)
RBC # BLD AUTO: 2.4 M/UL (ref 4.7–6.1)
SARS-COV-2 RNA RESP QL NAA+PROBE: NOTDETECTED
SODIUM SERPL-SCNC: 139 MMOL/L (ref 135–145)
SPECIMEN SOURCE: NORMAL
T4 FREE SERPL-MCNC: 1.31 NG/DL (ref 0.93–1.7)
TIBC SERPL-MCNC: 195 UG/DL (ref 250–450)
TROPONIN T SERPL-MCNC: 8 NG/L (ref 6–19)
TSH SERPL DL<=0.005 MIU/L-ACNC: 8.5 UIU/ML (ref 0.38–5.33)
UIBC SERPL-MCNC: 153 UG/DL (ref 110–370)
VIT B12 SERPL-MCNC: 537 PG/ML (ref 211–911)
WBC # BLD AUTO: 3.7 K/UL (ref 4.8–10.8)
WBC STL QL MICRO: NORMAL

## 2020-06-11 PROCEDURE — 84484 ASSAY OF TROPONIN QUANT: CPT

## 2020-06-11 PROCEDURE — 82272 OCCULT BLD FECES 1-3 TESTS: CPT

## 2020-06-11 PROCEDURE — 71275 CT ANGIOGRAPHY CHEST: CPT

## 2020-06-11 PROCEDURE — 83540 ASSAY OF IRON: CPT

## 2020-06-11 PROCEDURE — A9270 NON-COVERED ITEM OR SERVICE: HCPCS | Performed by: STUDENT IN AN ORGANIZED HEALTH CARE EDUCATION/TRAINING PROGRAM

## 2020-06-11 PROCEDURE — 84443 ASSAY THYROID STIM HORMONE: CPT

## 2020-06-11 PROCEDURE — 83880 ASSAY OF NATRIURETIC PEPTIDE: CPT

## 2020-06-11 PROCEDURE — 36415 COLL VENOUS BLD VENIPUNCTURE: CPT

## 2020-06-11 PROCEDURE — 93306 TTE W/DOPPLER COMPLETE: CPT

## 2020-06-11 PROCEDURE — U0004 COV-19 TEST NON-CDC HGH THRU: HCPCS

## 2020-06-11 PROCEDURE — 85027 COMPLETE CBC AUTOMATED: CPT

## 2020-06-11 PROCEDURE — 89055 LEUKOCYTE ASSESSMENT FECAL: CPT

## 2020-06-11 PROCEDURE — 84439 ASSAY OF FREE THYROXINE: CPT

## 2020-06-11 PROCEDURE — 83993 ASSAY FOR CALPROTECTIN FECAL: CPT

## 2020-06-11 PROCEDURE — 74177 CT ABD & PELVIS W/CONTRAST: CPT

## 2020-06-11 PROCEDURE — 82746 ASSAY OF FOLIC ACID SERUM: CPT

## 2020-06-11 PROCEDURE — 86140 C-REACTIVE PROTEIN: CPT

## 2020-06-11 PROCEDURE — C9803 HOPD COVID-19 SPEC COLLECT: HCPCS | Performed by: STUDENT IN AN ORGANIZED HEALTH CARE EDUCATION/TRAINING PROGRAM

## 2020-06-11 PROCEDURE — 82607 VITAMIN B-12: CPT

## 2020-06-11 PROCEDURE — 700102 HCHG RX REV CODE 250 W/ 637 OVERRIDE(OP): Performed by: STUDENT IN AN ORGANIZED HEALTH CARE EDUCATION/TRAINING PROGRAM

## 2020-06-11 PROCEDURE — 80048 BASIC METABOLIC PNL TOTAL CA: CPT

## 2020-06-11 PROCEDURE — 700111 HCHG RX REV CODE 636 W/ 250 OVERRIDE (IP): Performed by: EMERGENCY MEDICINE

## 2020-06-11 PROCEDURE — 83550 IRON BINDING TEST: CPT

## 2020-06-11 PROCEDURE — 99223 1ST HOSP IP/OBS HIGH 75: CPT | Mod: GC | Performed by: INTERNAL MEDICINE

## 2020-06-11 PROCEDURE — 82728 ASSAY OF FERRITIN: CPT

## 2020-06-11 PROCEDURE — 700117 HCHG RX CONTRAST REV CODE 255: Performed by: EMERGENCY MEDICINE

## 2020-06-11 PROCEDURE — 85652 RBC SED RATE AUTOMATED: CPT

## 2020-06-11 PROCEDURE — 770020 HCHG ROOM/CARE - TELE (206)

## 2020-06-11 PROCEDURE — 83630 LACTOFERRIN FECAL (QUAL): CPT

## 2020-06-11 PROCEDURE — 96372 THER/PROPH/DIAG INJ SC/IM: CPT

## 2020-06-11 PROCEDURE — 93010 ELECTROCARDIOGRAM REPORT: CPT | Performed by: INTERNAL MEDICINE

## 2020-06-11 PROCEDURE — 93306 TTE W/DOPPLER COMPLETE: CPT | Mod: 26 | Performed by: INTERNAL MEDICINE

## 2020-06-11 RX ORDER — FERROUS SULFATE 325(65) MG
325 TABLET ORAL 2 TIMES DAILY
COMMUNITY
End: 2022-01-31

## 2020-06-11 RX ORDER — BUDESONIDE 3 MG/1
6 CAPSULE, COATED PELLETS ORAL DAILY
Status: DISCONTINUED | OUTPATIENT
Start: 2020-06-11 | End: 2020-06-12 | Stop reason: HOSPADM

## 2020-06-11 RX ORDER — BISACODYL 10 MG
10 SUPPOSITORY, RECTAL RECTAL
Status: DISCONTINUED | OUTPATIENT
Start: 2020-06-11 | End: 2020-06-11

## 2020-06-11 RX ORDER — ACETAMINOPHEN 325 MG/1
650 TABLET ORAL EVERY 6 HOURS PRN
Status: DISCONTINUED | OUTPATIENT
Start: 2020-06-11 | End: 2020-06-12 | Stop reason: HOSPADM

## 2020-06-11 RX ORDER — ATORVASTATIN CALCIUM 10 MG/1
10 TABLET, FILM COATED ORAL EVERY MORNING
Status: DISCONTINUED | OUTPATIENT
Start: 2020-06-11 | End: 2020-06-12 | Stop reason: HOSPADM

## 2020-06-11 RX ORDER — PROMETHAZINE HYDROCHLORIDE 25 MG/1
12.5-25 SUPPOSITORY RECTAL EVERY 4 HOURS PRN
Status: DISCONTINUED | OUTPATIENT
Start: 2020-06-11 | End: 2020-06-12 | Stop reason: HOSPADM

## 2020-06-11 RX ORDER — PROMETHAZINE HYDROCHLORIDE 25 MG/1
12.5-25 TABLET ORAL EVERY 4 HOURS PRN
Status: DISCONTINUED | OUTPATIENT
Start: 2020-06-11 | End: 2020-06-12 | Stop reason: HOSPADM

## 2020-06-11 RX ORDER — POLYETHYLENE GLYCOL 3350 17 G/17G
1 POWDER, FOR SOLUTION ORAL
Status: DISCONTINUED | OUTPATIENT
Start: 2020-06-11 | End: 2020-06-11

## 2020-06-11 RX ORDER — MERCAPTOPURINE 50 MG/1
100 TABLET ORAL DAILY
Status: DISCONTINUED | OUTPATIENT
Start: 2020-06-11 | End: 2020-06-12 | Stop reason: HOSPADM

## 2020-06-11 RX ORDER — BUDESONIDE 3 MG/1
9 CAPSULE, COATED PELLETS ORAL DAILY
Status: DISCONTINUED | OUTPATIENT
Start: 2020-06-11 | End: 2020-06-11

## 2020-06-11 RX ORDER — LEVOTHYROXINE SODIUM 0.15 MG/1
150 TABLET ORAL EVERY MORNING
Status: DISCONTINUED | OUTPATIENT
Start: 2020-06-11 | End: 2020-06-11

## 2020-06-11 RX ORDER — AMOXICILLIN 250 MG
2 CAPSULE ORAL 2 TIMES DAILY
Status: DISCONTINUED | OUTPATIENT
Start: 2020-06-11 | End: 2020-06-11

## 2020-06-11 RX ORDER — BUDESONIDE 3 MG/1
6 CAPSULE, COATED PELLETS ORAL EVERY MORNING
COMMUNITY
End: 2020-06-22

## 2020-06-11 RX ORDER — PROCHLORPERAZINE EDISYLATE 5 MG/ML
5-10 INJECTION INTRAMUSCULAR; INTRAVENOUS EVERY 4 HOURS PRN
Status: DISCONTINUED | OUTPATIENT
Start: 2020-06-11 | End: 2020-06-12 | Stop reason: HOSPADM

## 2020-06-11 RX ORDER — LEVOTHYROXINE SODIUM 137 UG/1
137 TABLET ORAL
COMMUNITY
End: 2022-01-31 | Stop reason: SDUPTHER

## 2020-06-11 RX ORDER — CHLORAL HYDRATE 500 MG
1000 CAPSULE ORAL EVERY MORNING
Status: DISCONTINUED | OUTPATIENT
Start: 2020-06-11 | End: 2020-06-12 | Stop reason: HOSPADM

## 2020-06-11 RX ORDER — ONDANSETRON 2 MG/ML
4 INJECTION INTRAMUSCULAR; INTRAVENOUS EVERY 4 HOURS PRN
Status: DISCONTINUED | OUTPATIENT
Start: 2020-06-11 | End: 2020-06-12 | Stop reason: HOSPADM

## 2020-06-11 RX ORDER — OMEPRAZOLE 20 MG/1
20 CAPSULE, DELAYED RELEASE ORAL EVERY MORNING
Status: DISCONTINUED | OUTPATIENT
Start: 2020-06-11 | End: 2020-06-12 | Stop reason: HOSPADM

## 2020-06-11 RX ORDER — LEVOTHYROXINE SODIUM 137 UG/1
137 TABLET ORAL EVERY MORNING
Status: DISCONTINUED | OUTPATIENT
Start: 2020-06-12 | End: 2020-06-12 | Stop reason: HOSPADM

## 2020-06-11 RX ORDER — ONDANSETRON 4 MG/1
4 TABLET, ORALLY DISINTEGRATING ORAL EVERY 4 HOURS PRN
Status: DISCONTINUED | OUTPATIENT
Start: 2020-06-11 | End: 2020-06-12 | Stop reason: HOSPADM

## 2020-06-11 RX ADMIN — BUDESONIDE 6 MG: 3 CAPSULE, GELATIN COATED ORAL at 12:00

## 2020-06-11 RX ADMIN — LEVOTHYROXINE SODIUM 150 MCG: 150 TABLET ORAL at 08:47

## 2020-06-11 RX ADMIN — MERCAPTOPURINE 100 MG: 50 TABLET ORAL at 12:00

## 2020-06-11 RX ADMIN — IOHEXOL 100 ML: 350 INJECTION, SOLUTION INTRAVENOUS at 01:56

## 2020-06-11 RX ADMIN — RIVAROXABAN 15 MG: 15 TABLET, FILM COATED ORAL at 09:56

## 2020-06-11 RX ADMIN — ATORVASTATIN CALCIUM 10 MG: 10 TABLET, FILM COATED ORAL at 08:47

## 2020-06-11 RX ADMIN — ACETAMINOPHEN 650 MG: 325 TABLET, FILM COATED ORAL at 08:46

## 2020-06-11 RX ADMIN — OMEGA-3 FATTY ACIDS CAP 1000 MG 1000 MG: 1000 CAP at 08:47

## 2020-06-11 RX ADMIN — ENOXAPARIN SODIUM 80 MG: 80 INJECTION, SOLUTION INTRAVENOUS; SUBCUTANEOUS at 02:32

## 2020-06-11 RX ADMIN — RIVAROXABAN 15 MG: 15 TABLET, FILM COATED ORAL at 18:28

## 2020-06-11 RX ADMIN — OMEPRAZOLE 20 MG: 20 CAPSULE, DELAYED RELEASE ORAL at 08:47

## 2020-06-11 ASSESSMENT — ENCOUNTER SYMPTOMS
COUGH: 0
FLANK PAIN: 0
FLANK PAIN: 1
CONSTIPATION: 0
PHOTOPHOBIA: 0
DOUBLE VISION: 0
ABDOMINAL PAIN: 0
NAUSEA: 1
MYALGIAS: 0
HALLUCINATIONS: 0
SEIZURES: 0
FEVER: 0
BLURRED VISION: 0
NAUSEA: 0
DIZZINESS: 0
NECK PAIN: 0
WEIGHT LOSS: 1
FEVER: 1
DIARRHEA: 1
BACK PAIN: 0
DEPRESSION: 0
CHILLS: 0
SPUTUM PRODUCTION: 0
DIARRHEA: 0
HEADACHES: 0
TINGLING: 0
HEMOPTYSIS: 0
BLOOD IN STOOL: 1
WHEEZING: 0
COUGH: 1
VOMITING: 0
HEARTBURN: 1
HEADACHES: 1
ORTHOPNEA: 0
CHILLS: 1
LOSS OF CONSCIOUSNESS: 0
PALPITATIONS: 0
BRUISES/BLEEDS EASILY: 0
BACK PAIN: 1
CONSTITUTIONAL NEGATIVE: 1
SHORTNESS OF BREATH: 0
SINUS PAIN: 0
ABDOMINAL PAIN: 1
SORE THROAT: 0

## 2020-06-11 ASSESSMENT — LIFESTYLE VARIABLES
TOTAL SCORE: 0
EVER FELT BAD OR GUILTY ABOUT YOUR DRINKING: NO
TOTAL SCORE: 0
CONSUMPTION TOTAL: NEGATIVE
EVER_SMOKED: NEVER
TOTAL SCORE: 0
HAVE YOU EVER FELT YOU SHOULD CUT DOWN ON YOUR DRINKING: NO
ON A TYPICAL DAY WHEN YOU DRINK ALCOHOL HOW MANY DRINKS DO YOU HAVE: 0
HAVE PEOPLE ANNOYED YOU BY CRITICIZING YOUR DRINKING: NO
AVERAGE NUMBER OF DAYS PER WEEK YOU HAVE A DRINK CONTAINING ALCOHOL: 0
EVER HAD A DRINK FIRST THING IN THE MORNING TO STEADY YOUR NERVES TO GET RID OF A HANGOVER: NO
ALCOHOL_USE: NO
HOW MANY TIMES IN THE PAST YEAR HAVE YOU HAD 5 OR MORE DRINKS IN A DAY: 0

## 2020-06-11 ASSESSMENT — COGNITIVE AND FUNCTIONAL STATUS - GENERAL
DAILY ACTIVITIY SCORE: 24
MOBILITY SCORE: 24
SUGGESTED CMS G CODE MODIFIER DAILY ACTIVITY: CH
SUGGESTED CMS G CODE MODIFIER MOBILITY: CH

## 2020-06-11 ASSESSMENT — PATIENT HEALTH QUESTIONNAIRE - PHQ9
SUM OF ALL RESPONSES TO PHQ9 QUESTIONS 1 AND 2: 0
2. FEELING DOWN, DEPRESSED, IRRITABLE, OR HOPELESS: NOT AT ALL
1. LITTLE INTEREST OR PLEASURE IN DOING THINGS: NOT AT ALL

## 2020-06-11 NOTE — ASSESSMENT & PLAN NOTE
Uncontrolled Ulcerative Colitis - still experiencing flares, currently on Stelara [id number] and Mercaptopurine, last Infusion on 4/29/20.     - Pending Lactoferrin to rule out infectious cause of diarrhea   - Day team to reassess starting Mercaptopurine

## 2020-06-11 NOTE — NON-PROVIDER
Daily Progress Note:     Date of Service: 6/11/2020  Primary Team: [facility] Blue Team and [facility] Gray Team   Attending: Conor Cedeno M.D.   Senior Resident: Dr. Bautista  Intern: Dr. Cornell  Contact:  [phone]    Chief Complaint:   Fever, 1 month bilateral flank pain    Subjective:  Arsen is a 65 y.o. male with past medical history of uncontroled ulcerative colitis who presented 6/10/2020 with one month of gradually worsening fevers over the past month and a dull abdominal pain worse with deep inspiration found to have right pulmonary embolism.      Overnight:  No acute events. Patient reports dull left sided chest/flank pain that is worsening and occurs with deep inspiration. He has been taking shallow breaths to improve pain but has not been able to find a comfortable position. He also reports a cough for some time with some phlegm that he is not able to get out. No fever, chills, dyspnea, abdomina pain, nausea, or diarrhea.     Consultants/Specialty:    Review of Systems:    Review of Systems   Constitutional: Negative.    HENT: Negative for congestion and sore throat.    Respiratory: Positive for cough. Negative for sputum production, shortness of breath and wheezing.    Cardiovascular: Negative for chest pain.   Gastrointestinal: Negative for abdominal pain, diarrhea, nausea and vomiting.   Genitourinary: Positive for flank pain.   Musculoskeletal: Negative for back pain.   Neurological: Negative for dizziness and headaches.       Objective Data:   Physical Exam:   Vitals:   Temp:  [36.6 °C (97.8 °F)-37.1 °C (98.7 °F)] 37.1 °C (98.7 °F)  Pulse:  [70-82] 82  Resp:  [15-18] 18  BP: (103-120)/(57-63) 120/59  SpO2:  [94 %-97 %] 95 %    Physical Exam  Constitutional:       General: He is not in acute distress.  HENT:      Head: Normocephalic and atraumatic.   Eyes:      Extraocular Movements: Extraocular movements intact.      Conjunctiva/sclera: Conjunctivae normal.   Neck:      Musculoskeletal: Normal  range of motion and neck supple.   Cardiovascular:      Rate and Rhythm: Normal rate and regular rhythm.   Pulmonary:      Effort: Pulmonary effort is normal. No respiratory distress.   Chest:      Chest wall: No tenderness.   Abdominal:      General: Abdomen is flat. Bowel sounds are normal.      Palpations: Abdomen is soft.      Tenderness: There is no abdominal tenderness.   Musculoskeletal: Normal range of motion.   Skin:     General: Skin is warm and dry.      Capillary Refill: Capillary refill takes less than 2 seconds.   Neurological:      General: No focal deficit present.      Mental Status: He is alert.      Cranial Nerves: No cranial nerve deficit.   Psychiatric:         Mood and Affect: Mood normal.         Behavior: Behavior normal.           Labs:   Results for orders placed or performed during the hospital encounter of 06/10/20   CBC WITH DIFFERENTIAL   Result Value Ref Range    WBC 3.6 (L) 4.8 - 10.8 K/uL    RBC 2.62 (L) 4.70 - 6.10 M/uL    Hemoglobin 9.5 (L) 14.0 - 18.0 g/dL    Hematocrit 30.0 (L) 42.0 - 52.0 %    .5 (H) 81.4 - 97.8 fL    MCH 36.3 (H) 27.0 - 33.0 pg    MCHC 31.7 (L) 33.7 - 35.3 g/dL    RDW 86.5 (H) 35.9 - 50.0 fL    Platelet Count 193 164 - 446 K/uL    MPV 9.0 9.0 - 12.9 fL    Neutrophils-Polys 75.00 (H) 44.00 - 72.00 %    Lymphocytes 9.90 (L) 22.00 - 41.00 %    Monocytes 10.20 0.00 - 13.40 %    Eosinophils 3.00 0.00 - 6.90 %    Basophils 1.10 0.00 - 1.80 %    Immature Granulocytes 0.80 0.00 - 0.90 %    Nucleated RBC 0.00 /100 WBC    Neutrophils (Absolute) 2.71 1.82 - 7.42 K/uL    Lymphs (Absolute) 0.36 (L) 1.00 - 4.80 K/uL    Monos (Absolute) 0.37 0.00 - 0.85 K/uL    Eos (Absolute) 0.11 0.00 - 0.51 K/uL    Baso (Absolute) 0.04 0.00 - 0.12 K/uL    Immature Granulocytes (abs) 0.03 0.00 - 0.11 K/uL    NRBC (Absolute) 0.00 K/uL    Anisocytosis 1+     Macrocytosis 1+    COMP METABOLIC PANEL   Result Value Ref Range    Sodium 137 135 - 145 mmol/L    Potassium 4.0 3.6 - 5.5 mmol/L     Chloride 98 96 - 112 mmol/L    Co2 26 20 - 33 mmol/L    Anion Gap 13.0 7.0 - 16.0    Glucose 99 65 - 99 mg/dL    Bun 11 8 - 22 mg/dL    Creatinine 0.89 0.50 - 1.40 mg/dL    Calcium 9.0 8.5 - 10.5 mg/dL    AST(SGOT) 18 12 - 45 U/L    ALT(SGPT) 15 2 - 50 U/L    Alkaline Phosphatase 57 30 - 99 U/L    Total Bilirubin 0.7 0.1 - 1.5 mg/dL    Albumin 3.6 3.2 - 4.9 g/dL    Total Protein 7.4 6.0 - 8.2 g/dL    Globulin 3.8 (H) 1.9 - 3.5 g/dL    A-G Ratio 0.9 g/dL   LIPASE   Result Value Ref Range    Lipase 20 11 - 82 U/L   URINALYSIS,CULTURE IF INDICATED    Specimen: Urine, Clean Catch   Result Value Ref Range    Color Yellow     Character Clear     Specific Gravity 1.015 <1.035    Ph 6.0 5.0 - 8.0    Glucose Negative Negative mg/dL    Ketones Negative Negative mg/dL    Protein Negative Negative mg/dL    Bilirubin Negative Negative    Urobilinogen, Urine 0.2 Negative    Nitrite Negative Negative    Leukocyte Esterase Negative Negative    Occult Blood Trace (A) Negative    Micro Urine Req Microscopic    ESTIMATED GFR   Result Value Ref Range    GFR If African American >60 >60 mL/min/1.73 m 2    GFR If Non African American >60 >60 mL/min/1.73 m 2   D-DIMER   Result Value Ref Range    D-Dimer Screen 3.32 (H) 0.00 - 0.50 ug/mL (FEU)   PERIPHERAL SMEAR REVIEW   Result Value Ref Range    Peripheral Smear Review see below    PLATELET ESTIMATE   Result Value Ref Range    Plt Estimation Normal    MORPHOLOGY   Result Value Ref Range    RBC Morphology Present     Poikilocytosis 1+     Ovalocytes 1+    DIFFERENTIAL COMMENT   Result Value Ref Range    Comments-Diff see below    URINE MICROSCOPIC (W/UA)   Result Value Ref Range    WBC 0-2 (A) /hpf    RBC 2-5 (A) /hpf    Bacteria Negative None /hpf    Epithelial Cells Negative /hpf    Hyaline Cast 0-2 /lpf   TROPONIN   Result Value Ref Range    Troponin T 8 6 - 19 ng/L   proBrain Natriuretic Peptide, NT   Result Value Ref Range    NT-proBNP 125 0 - 125 pg/mL   VITAMIN B12   Result Value  Ref Range    Vitamin B12 -True Cobalamin 537 211 - 911 pg/mL   IRON/TOTAL IRON BIND   Result Value Ref Range    Iron 42 (L) 50 - 180 ug/dL    Total Iron Binding 195 (L) 250 - 450 ug/dL    Unsat Iron Binding 153 110 - 370 ug/dL    % Saturation 22 15 - 55 %   FERRITIN   Result Value Ref Range    Ferritin 59.9 22.0 - 322.0 ng/mL   CRP QUANTITIVE (NON-CARDIAC)   Result Value Ref Range    Stat C-Reactive Protein 7.39 (H) 0.00 - 0.75 mg/dL   EKG (NOW)   Result Value Ref Range    Report       [facility]    Test Date:  2020-06-10  Pt Name:    ANAMARIA CORDOVA                Department: ER  MRN:        [id number]                      Room:        13  Gender:     Male                         Technician: [id number]  :        [id number]                   Requested By:TURNER MURPHY  Order #:    [id number]                    Reading MD: Isaiah Hassan MD    Measurements  Intervals                                Axis  Rate:       72                           P:          77  ID:         [id number]                          QRS:        78  QRSD:       86                           T:          56  QT:         404  QTc:        443    Interpretive Statements  SINUS RHYTHM  Electronically Signed On 2020 3:32:09 PDT by Isaiah Hassan MD         Imaging:   CT-CTA CHEST PULMONARY ARTERY W/ RECONS   Final Result         1.  Large right main pulmonary embolus extending into the right lower and middle lobe segmental and subsegmental branches.   2.  Patchy right lung base opacity, consider infiltrate, could represent changes related to pulmonary infarct.   3.  Atherosclerosis.      These findings were discussed with the patient's clinician, Turner Murphy, on 2020 2:14 AM.      CT-ABDOMEN-PELVIS WITH   Final Result         1.  Changes of the descending colon, sigmoid, and rectum. Appearance most compatible with proctocolitis. Consider inflammatory or infectious etiologies.   2.  Atherosclerosis      EC-ECHOCARDIOGRAM COMPLETE W/O CONT     (Results Pending)       Pulmonary Embolism:  CTA Chest- large right main PE extending into RL and RM lobe segmental and subsegmental branches. Hemodynamically stable.  -Rivaroxaban 15mg BID   -Tylenol for pain  -Supplemental O2 PRN, O2 saturation >90%  -Compression stockings, ambulation, and incentive spirometer  -Consider coagulopathy workup outpatient    Ulcerative colitis:  Diagnosed 3 years ago, uncontrolled. Followed by Dr. Hilton. Colonoscopy July 2019, Sigmoioscopy 06/15/20    -Zofran, Phenergan PRN  -Tylenol PRN  -Continue usetekinub, mercaptopurine, budesonide   -Monitor bloody diarrhea  -Followup with Dr. Hilton on 06/15/20    Macrocytic anemia:  Most likely secondary to 6-Mercaptopurine  -Continue to monitor  -Transfuse if Hcb <7    Hypothyroidism:  -Synthroid 150mch QAM

## 2020-06-11 NOTE — ASSESSMENT & PLAN NOTE
CTPE positive for pulmonary embolism, patient only complaining of fever and bilateral abdominal pain. Hemodynamically stable   - Negative Troponin/BMP no Right heart strain documented on CT-PE   - No recent surgery, immobility, warrants age appropriate screening   - Lovenox given in ED, switched to Xarelto     Pending ECHO

## 2020-06-11 NOTE — ASSESSMENT & PLAN NOTE
Hx of macrocytic anemia with Low Iron/TIBC/% Saturation/Ferritin on labs from July 2019 (normal B12/folate)   - Likely multifactorial given hx of hypothyroidism and drug induced (mercaptopurine, 5ASA)   - Pending Repeat anemia workup  - CTM, transfusion if Hgb <7

## 2020-06-11 NOTE — H&P
"History & Physical Note    Date of Admission: 6/11/2020  Admission Status: Inpatient  [facility] Team: [facility]  Attending: GODWIN Cedeno   Senior Resident: Dr. Bautista  Intern: Dr. Rivrea  Contact Number: [phone]    Chief Complaint: Fever x1 month with bilateral flank pain    History of Present Illness (HPI):  Arsen is a 65 y.o. male with past medical history of hypothyroidism, ulcerative colitis, GERD who presented 6/10/2020 with worsening intermittent fevers at home over the last month and abdominal \"band-like\" ache that worsens with deep breath that started about 1 week ago. He also endorses a headache, nausea, diarrhea (UC flare last week with 10+ bloody BM). States he called his GI doctor and was advised to present to ED.     Patient doesn't report recent long-distance traveling, other than driving to [place] from [place], no recent surgeries and no hx of personal cancer. He did report unilateral leg swelling/pain approximately 3-4 months ago, states he presented to [place] ED and got evaluated and was negative for a DVT. He also reported low blood pressure and tachycardia 3-4 months ago and was referred to a cardiologist who he saw in November, had an ECHO done at that time.     He states the fevers became more noticeable after getting a blood transfusion 1 month ago given low red blood cells, (2 units). States fever started after second unit. Since then, he measured his temperature often and today was the highest it has been.     In ED, patient presented with stable vital signs, his lab work was pertinent for anemia, leukopenia, and positive D-Dimer. A CT abdomen pelvis and CT PE were done which showed a pulmonary embolism to right main PA into right LL and RML and subsegmental branches.   He was started on weight based lovenox in ED.     Further workup showed no evidence of right heart strain including EKG and Troponin. Patient was hemodynamically stable and ambulating without difficulties.     In " regards to his UC, it appears to be uncontrolled, he was diagnosed 3 years ago and since then he has failed multiple medications including Humira and Entyvio. He had a colonoscopy last year in [place] and another sigmoidoscopy in Dec 2019 by his GI doctor, Dr. Hilton    Review of Systems:   Review of Systems   Constitutional: Positive for chills, fever, malaise/fatigue and weight loss.   HENT: Negative for congestion, sinus pain and sore throat.    Eyes: Negative for blurred vision and double vision.   Respiratory: Negative for cough, hemoptysis and shortness of breath.    Cardiovascular: Negative for chest pain, palpitations, orthopnea and leg swelling.   Gastrointestinal: Positive for abdominal pain, blood in stool, diarrhea, heartburn and nausea. Negative for constipation and vomiting.   Genitourinary: Negative for dysuria, flank pain, frequency, hematuria and urgency.   Musculoskeletal: Negative for myalgias and neck pain.   Skin: Negative for itching and rash.   Neurological: Positive for headaches. Negative for tingling, seizures and loss of consciousness.   Endo/Heme/Allergies: Negative for environmental allergies. Does not bruise/bleed easily.   Psychiatric/Behavioral: Negative for depression and suicidal ideas.       Past Medical History:   Past Medical History was reviewed with patient.   has a past medical history of Arrhythmia, Arthritis, Hiatus hernia syndrome, High cholesterol, Hypothyroid, Sleep apnea, Snoring, and Ulcerative colitis (HCC).    Past Surgical History: Past Surgical History was reviewed with patient.   has a past surgical history that includes pin insertion (5/4/2012); nerve repair (5/4/2012); artery exploration or repair (5/4/2012); ligament repair (5/4/2012); flap graft (6/20/2012); full thickness skin graft (6/20/2012); knee arthroplasty total (2013); knee arthroscopy (2/12/2015); and medial meniscectomy (2/12/2015).    Medications: Medications have been reviewed with  patient.  Prior to Admission Medications   Prescriptions Last Dose Informant Patient Reported? Taking?   Calcium Carb-Cholecalciferol 600-800 MG-UNIT Tab   Yes No   Sig: Take  by mouth.   Cholecalciferol (VITAMIN D3) 2000 UNIT CAPS  Patient Yes No   Sig: Take 1 Cap by mouth every morning.   Fexofenadine HCl (ALLERGY 24-HR PO)   Yes No   Sig: Take  by mouth.   MERCAPTOPURINE PO   Yes Yes   Sig: Take  by mouth.   Multiple Vitamin (ONE-DAILY MULTIVITAMINS) Tab   Yes No   Sig: Take 1 Tab by mouth.   Omega-3 Fatty Acids (FISH OIL) 1000 MG CAPS capsule  Patient Yes No   Sig: Take 1,000 mg by mouth every morning.   UCERIS 9 MG TABLET SR 24 HR   Yes No   Sig: Take 9 mg by mouth every day.   Ustekinumab (STELARA SC)   Yes No   Sig: Inject  as instructed Q 8 WEEKS.   atorvastatin (LIPITOR) 10 MG TABS  Patient Yes No   Sig: Take 10 mg by mouth every morning.   lansoprazole (PREVACID) 30 MG CPDR  Patient Yes No   Sig: Take 30 mg by mouth every morning. Indications: Gastroesophageal Reflux Disease   levothyroxine (SYNTHROID) 150 MCG TABS  Patient Yes No   Sig: Take 150 mcg by mouth every morning.      Facility-Administered Medications: None     Allergies: Allergies have been reviewed with patient.  Allergies   Allergen Reactions   • Pcn [Penicillins] Rash     .   • Sulfa Drugs Unspecified     Mouth sores, swelling tongue, loss of taste     Family History:   - Mother - hypercoagulable disorder    Social History:   Tobacco: never  Alcohol: never   Recreational drugs (illegal and prescription):  None   Employment:   Activity Level: Regular   Living situation:  Lives in [place] with wife  Recent travel:  Denies, only from [place] to [place] and back  Primary Care Provider: reviewed Wolfgang Almaguer M.D.    Physical Exam:   Vitals:  Temp:  [36.6 °C (97.8 °F)-37.1 °C (98.7 °F)] 37.1 °C (98.7 °F)  Pulse:  [70-82] 82  Resp:  [15-18] 18  BP: (103-120)/(57-63) 120/59  SpO2:  [94 %-97 %] 95 %    Physical Exam  Vitals signs and nursing  note reviewed.   Constitutional:       General: He is not in acute distress.     Appearance: He is normal weight. He is not ill-appearing.   HENT:      Head: Normocephalic and atraumatic.      Mouth/Throat:      Mouth: Mucous membranes are dry.      Pharynx: Oropharynx is clear.   Eyes:      Extraocular Movements: Extraocular movements intact.      Pupils: Pupils are equal, round, and reactive to light.   Neck:      Musculoskeletal: Normal range of motion and neck supple.   Cardiovascular:      Rate and Rhythm: Normal rate and regular rhythm.      Pulses: Normal pulses.      Heart sounds: Normal heart sounds.   Pulmonary:      Effort: Pulmonary effort is normal.      Breath sounds: Normal breath sounds.   Abdominal:      General: Abdomen is flat. Bowel sounds are normal. There is no distension.      Palpations: Abdomen is soft.      Tenderness: There is no abdominal tenderness.   Musculoskeletal: Normal range of motion.         General: No swelling or tenderness.   Skin:     General: Skin is warm and dry.   Neurological:      General: No focal deficit present.      Mental Status: He is alert and oriented to person, place, and time. Mental status is at baseline.   Psychiatric:         Mood and Affect: Mood normal.         Behavior: Behavior normal.         Thought Content: Thought content normal.         Judgment: Judgment normal.     Labs:   Lab Results   Component Value Date/Time    WBC 3.6 (L) 06/10/2020 09:05 PM    RBC 2.62 (L) 06/10/2020 09:05 PM    HEMOGLOBIN 9.5 (L) 06/10/2020 09:05 PM    HEMATOCRIT 30.0 (L) 06/10/2020 09:05 PM    .5 (H) 06/10/2020 09:05 PM    MCH 36.3 (H) 06/10/2020 09:05 PM    MCHC 31.7 (L) 06/10/2020 09:05 PM    MPV 9.0 06/10/2020 09:05 PM    NEUTSPOLYS 75.00 (H) 06/10/2020 09:05 PM    LYMPHOCYTES 9.90 (L) 06/10/2020 09:05 PM    MONOCYTES 10.20 06/10/2020 09:05 PM    EOSINOPHILS 3.00 06/10/2020 09:05 PM    BASOPHILS 1.10 06/10/2020 09:05 PM    ANISOCYTOSIS 1+ 06/10/2020 09:05 PM       Lab Results   Component Value Date/Time    SODIUM 137 06/10/2020 09:05 PM    POTASSIUM 4.0 06/10/2020 09:05 PM    CHLORIDE 98 06/10/2020 09:05 PM    CO2 26 06/10/2020 09:05 PM    GLUCOSE 99 06/10/2020 09:05 PM    BUN 11 06/10/2020 09:05 PM    CREATININE 0.89 06/10/2020 09:05 PM      Imaging:   CT-CTA CHEST PULMONARY ARTERY W/ RECONS   Final Result         1.  Large right main pulmonary embolus extending into the right lower and middle lobe segmental and subsegmental branches.   2.  Patchy right lung base opacity, consider infiltrate, could represent changes related to pulmonary infarct.   3.  Atherosclerosis.      These findings were discussed with the patient's clinician, Turner Murphy, on 6/11/2020 2:14 AM.      CT-ABDOMEN-PELVIS WITH   Final Result         1.  Changes of the descending colon, sigmoid, and rectum. Appearance most compatible with proctocolitis. Consider inflammatory or infectious etiologies.   2.  Atherosclerosis      EC-ECHOCARDIOGRAM COMPLETE W/O CONT    (Results Pending)     Previous Data Review: reviewed    Macrocytic anemia  Assessment & Plan  Hx of macrocytic anemia with Low Iron/TIBC/% Saturation/Ferritin on labs from July 2019 (normal B12/folate)   - Likely multifactorial given hx of hypothyroidism and drug induced (mercaptopurine, 5ASA)   - Pending Repeat anemia workup  - CTM, transfusion if Hgb <7    Ulcerative colitis (HCC)  Assessment & Plan  Uncontrolled Ulcerative Colitis - still experiencing flares, currently on Stelara [id number] and Mercaptopurine, last Infusion on 4/29/20.     - Pending Lactoferrin to rule out infectious cause of diarrhea   - Day team to reassess starting Mercaptopurine     Acute pulmonary embolism without acute cor pulmonale (HCC)  Assessment & Plan  CTPE positive for pulmonary embolism, patient only complaining of fever and bilateral abdominal pain. Hemodynamically stable   - Negative Troponin/BMP no Right heart strain documented on CT-PE   - No recent surgery,  immobility, warrants age appropriate screening   - Lovenox given in ED, switched to Xarelto     Pending ECHO       Hypothyroidism- (present on admission)  Assessment & Plan  Continue with home medication

## 2020-06-11 NOTE — ASSESSMENT & PLAN NOTE
Active Ulcerative Colitis   Patient continues to experience flares  High risk for VTE  Discharge home today w/ instructions to:  - Continue Stelara [id number], Mercaptopurine, and budesonide  - Outpatient follow up with GI as scheduled  - Outpatient PCP follow up in 2 weeks  - Continue Xarelto as prescribed

## 2020-06-11 NOTE — ED TRIAGE NOTES
"Arsen Stroud  65 y.o. male  Chief Complaint   Patient presents with   • Abdominal Pain     Pt reports 2/10 abdominal pain, occuring with deep breaths for the last 24 hours. Sent in by GI MD, Dr. Hilton with [facility]. Recurrent issue, last episode 3 weeks ago. No abnormal N/V/D, pt does report severe ulcerative colitis. Reports chronic bloody stools, taking iron supplements. No difficulty with urination.    • Fever     x 1 month, previously tested covid NEGATIVE. Pt had a fever of 101.9f PTA and took tylenol at 1700.     Pt currently taking a cancer drug to suppress his immune system in order for his UC medication to be effective. Per pt, history of \"low labs\". Pt placed in senior lounge with mask.    Pt ambulatory to triage with steady gait for above complaint.   Pt is alert and oriented, speaking in full sentences, follows commands and responds appropriately to questions. Resp are even and unlabored. No behavioral indicators of pain.     Charge updated on pt.    /63   Pulse 78   Temp 36.6 °C (97.8 °F) (Temporal)   Resp 16   Ht 1.88 m (6' 2\")   Wt 73.8 kg (162 lb 11.2 oz)   SpO2 97%     "

## 2020-06-11 NOTE — ED PROVIDER NOTES
ED Provider Note      Means of Arrival: Private vehicle  History obtained from: Patient, medical record      CHIEF COMPLAINT  Chief Complaint   Patient presents with   • Abdominal Pain     Pt reports 2/10 abdominal pain, occuring with deep breaths for the last 24 hours. Sent in by GI MD, Dr. Hilton with [facility]. Recurrent issue, last episode 3 weeks ago. No abnormal N/V/D, pt does report severe ulcerative colitis. Reports chronic bloody stools, taking iron supplements. No difficulty with urination.    • Fever     x 1 month, previously tested covid NEGATIVE. Pt had a fever of 101.9f PTA and took tylenol at 1700.       HPI  Arsen Stroud is a 65 y.o. male who presents with 1 month of intermittent fevers as well as abdominal pain.  The patient reports that the fever has not been changing since that time, it comes and goes.  He also reports some bilateral lower abdominal pain, worse with deep breaths.  Is also has not changed over the past month.  The patient has a history of refractory ulcerative colitis, and he is on immunomodulators.  He denies any chest pain, shortness of breath.  He reports he was seen 2 to 3 weeks ago at an outside hospital, where imaging was done relating to pain on his right-sided chest wall.  That pain has resolved.  He reports that he is increasingly fatigued.  He denies any current chest pain or shortness of breath.  Denies any history of blood clots.    REVIEW OF SYSTEMS  CONSTITUTIONAL:  No fever.  CARDIOVASCULAR:  No chest discomfort.  RESPIRATORY:  No pleuritic chest pain.  GASTROINTESTINAL:   See HPI  GENITOURINARY:   No dysuria.  MUSCULOSKELETAL:  No arthralgia.  SKIN:  No rash or suspicious lesions.  NEUROLOGIC:   No headache.  ENDOCRINE:  No facial edema.  HEMATOLOGIC:  No abnormal bleeding.       See HPI for further details.   All other systems are negative.     PAST MEDICAL HISTORY  Past Medical History:   Diagnosis Date   • Arrhythmia     reports occasional  "\"Fluttering\", states work up neg.    • Arthritis     knees   • Hiatus hernia syndrome    • High cholesterol    • Hypothyroid    • Sleep apnea     had a sleep study. doesn't use cpap   • Snoring    • Ulcerative colitis (HCC)        FAMILY HISTORY  History reviewed. No pertinent family history.    SOCIAL HISTORY   reports that he has never smoked. He has never used smokeless tobacco. He reports that he does not drink alcohol or use drugs.    SURGICAL HISTORY  Past Surgical History:   Procedure Laterality Date   • KNEE ARTHROSCOPY  2/12/2015    Performed by Greg Chaves M.D. at SUR[facility]ERY [facility]   • MEDIAL MENISCECTOMY  2/12/2015    Performed by Greg [facility]aves M.D. at [facility]   • KNEE ARTHROPLASTY TOTAL  2013    Knee Replacement, Total, left   • FLAP GRAFT  6/20/2012    Performed by GREG NAGY at [facility]   • FULL THICKNESS SKIN GRAFT  6/20/2012    Performed by GREG NAGY at [facility]   • PIN INSERTION  5/4/2012    Performed by GERG NAGY at SURGERY [facility]   • NERVE REPAIR  5/4/2012    Performed by GREG NAGY at SURGERY [facility]   • ARTERY EXPLORATION OR REPAIR  5/4/2012    Performed by GREG NAGY at SURGERY [facility]   • LIGAMENT REPAIR  5/4/2012    Performed by GREG NAGY at SURGERY [facility]       CURRENT MEDICATIONS  Home Medications     Reviewed by Tiffanie English R.N. (Registered Nurse) on 06/10/20 at 2011  Med List Status: Partial   Medication Last Dose Status   atorvastatin (LIPITOR) 10 MG TABS  Active   Calcium Carb-Cholecalciferol 600-800 MG-UNIT Tab  Active   Cholecalciferol (VITAMIN D3) 2000 UNIT CAPS  Active   Fexofenadine HCl (ALLERGY 24-HR PO)  Active   lansoprazole (PREVACID) 30 MG CPDR  Active   levothyroxine (SYNTHROID) 150 MCG TABS  Active   MERCAPTOPURINE PO  Active   Multiple Vitamin (ONE-DAILY MULTIVITAMINS) Tab  Active   Omega-3 Fatty Acids (FISH OIL) 1000 MG CAPS " "capsule  Active   UCERIS 9 MG TABLET SR 24 HR  Active   Ustekinumab (STELARA SC)  Active                ALLERGIES  Allergies   Allergen Reactions   • Pcn [Penicillins] Rash     .   • Sulfa Drugs Unspecified     Mouth sores, swelling tongue, loss of taste       PHYSICAL EXAM  VITAL SIGNS: /58   Pulse 73   Temp 36.6 °C (97.8 °F) (Temporal)   Resp 15   Ht 1.88 m (6' 2\")   Wt 73.8 kg (162 lb 11.2 oz)   SpO2 95%   BMI 20.89 kg/m²    Gen: Alert  HENT: ATNC  Eyes: Normal conjunctiva  Neck: trachea midline  Resp: no respiratory distress, clear to auscultation bilaterally  CV: No JVD regular rate and rhythm, no murmurs, rubs, gallops  Abd: non-distended, nontender  Ext: No deformities, no pedal edema  Psych: normal mood  Neuro: speech fluent       RADIOLOGY/PROCEDURES  CT-CTA CHEST PULMONARY ARTERY W/ RECONS   Final Result         1.  Large right main pulmonary embolus extending into the right lower and middle lobe segmental and subsegmental branches.   2.  Patchy right lung base opacity, consider infiltrate, could represent changes related to pulmonary infarct.   3.  Atherosclerosis.      These findings were discussed with the patient's clinician, Turner Murphy, on 6/11/2020 2:14 AM.      CT-ABDOMEN-PELVIS WITH   Final Result         1.  Changes of the descending colon, sigmoid, and rectum. Appearance most compatible with proctocolitis. Consider inflammatory or infectious etiologies.   2.  Atherosclerosis      EC-ECHOCARDIOGRAM COMPLETE W/O CONT    (Results Pending)       LABS  Labs Reviewed   CBC WITH DIFFERENTIAL - Abnormal; Notable for the following components:       Result Value    WBC 3.6 (*)     RBC 2.62 (*)     Hemoglobin 9.5 (*)     Hematocrit 30.0 (*)     .5 (*)     MCH 36.3 (*)     MCHC 31.7 (*)     RDW 86.5 (*)     Neutrophils-Polys 75.00 (*)     Lymphocytes 9.90 (*)     Lymphs (Absolute) 0.36 (*)     All other components within normal limits   COMP METABOLIC PANEL - Abnormal; Notable for the " "following components:    Globulin 3.8 (*)     All other components within normal limits   URINALYSIS,CULTURE IF INDICATED - Abnormal; Notable for the following components:    Occult Blood Trace (*)     All other components within normal limits   D-DIMER - Abnormal; Notable for the following components:    D-Dimer Screen 3.32 (*)     All other components within normal limits   URINE MICROSCOPIC (W/UA) - Abnormal; Notable for the following components:    WBC 0-2 (*)     RBC 2-5 (*)     All other components within normal limits   LIPASE   BLOOD CULTURE    Narrative:     Per Hospital Policy: Only change Specimen Src: to \"Line\" if  specified by physician order.   BLOOD CULTURE    Narrative:     Per Hospital Policy: Only change Specimen Src: to \"Line\" if  specified by physician order.   ESTIMATED GFR   PERIPHERAL SMEAR REVIEW   PLATELET ESTIMATE   MORPHOLOGY   DIFFERENTIAL COMMENT   TROPONIN   PROBRAIN NATRIURETIC PEPTIDE, NT        EKG  Results for orders placed or performed during the hospital encounter of 06/10/20   EKG (NOW)   Result Value Ref Range    Report       [facility]    Test Date:  2020-06-10  Pt Name:    ANAMARIA CORDOVA                Department: ER  MRN:        [id number]                      Room:       [facility]  Gender:     Male                         Technician: [id number]  :        [id number]                   Requested By:TANJA SHANKS  Order #:    [id number]                    Reading MD:    Measurements  Intervals                                Axis  Rate:       72                           P:          77  RI:         158                          QRS:        78  QRSD:       86                           T:          56  QT:         404  QTc:        443    Interpretive Statements  SINUS RHYTHM  Compared to ECG 2019 09:12:32  Right-axis deviation no longer present     I reviewed the EKG and agree with the above interpretation.  The EKG did not cross into [place], therefore I could not sign " it.    COURSE & MEDICAL DECISION MAKING  Pertinent Labs & Imaging studies reviewed. (See chart for details)    Patient presents with 1 month of fevers and ongoing abdominal pain in the setting of immunosuppression.  His initial physical exam is reassuring.  He has a benign abdominal exam.  Attempt to get records from the outside hospital was unsuccessful as they are unable to provide records at this time.  Given the patient's history of chest pain, as well as pain in his lower abdomen only with deep breath makes me concern for possible occult PE given his benign abdominal exam.  Alternative diagnoses include possible malignancy.  Case was discussed with his GI doctor, Dr. Hilton,, who agrees with work-up for possible occult infection, malignancy, and evaluation for potential pulmonary embolism.  Patient demonstrates no high risk features for PE.  Will start with d-dimer.  Patient's labs otherwise reassuring.    Unable to obtain records from outside hospital as they do not have records available at this hour.    Patient's d-dimer markedly elevated, CTA of the chest obtained as well as CT of the abdomen pelvis with contrast.    Received a call from radiology, the patient does have a large pulmonary embolism.  He continues to be hemodynamically stable.  He does have a history of GI bleeding from his ulcerative colitis, will provide 1 you per cake dosing of Lovenox, plan for hospitalization for further evaluation and treatment.    Case discussed with [facility] internal medicine, who accepts hospitalization.  Patient will be hospitalized in guarded condition.      Appropriate PPE were worn at this encounter.     FINAL IMPRESSION  1. Acute pulmonary embolism, unspecified pulmonary embolism type, unspecified whether acute cor pulmonale present (HCC)

## 2020-06-11 NOTE — ASSESSMENT & PLAN NOTE
Iron panel obtained inpatient  Iron 42, ferritin 59.9, percentage saturation 22, TIBC 195  Vitamin B12 and folic acid levels WNL  Likely secondary to IBD therapy  - PCP to follow up and monitor

## 2020-06-11 NOTE — PROGRESS NOTES
Daily Progress Note:     Date of Service: 6/11/2020  Primary Team: [facility] Team   Attending: Conor Cedeno M.D.   Senior Resident: Dr. Bautista   Intern: Dr. Rivera  Contact:  [phone]    Chief Complaint:   -Fever  -Abdominal pain    ID   65-year-old male with past medical history of ulcerative colitis for the past 3 years, hypothyroidism, GERD, and dyslipidemia presented on 6/10/2020 with fever of 102 °F prior to presentation.  He has had fever for the past 1 month, T-max 100 °F however due to increasing temperature, and mild right upper abdominal pain, patient decided to come to the ER.  He underwent CT chest after positive d-dimer test which showed a large right pulmonary embolism, was given Lovenox in the ED and was admitted for further management.    Interval History  -No acute events overnight.  No more fever episodes.  Continues to have difficulty in taking deep breath so he tries to take shallow breaths  -Patient was started on Xarelto  -Had bloody diarrhea this morning which was not bad according to patient compared to his previous UC flare    Consultants/Specialty:  None    Review of Systems:   Review of Systems   Constitutional: Negative for chills, fever and malaise/fatigue.   HENT: Negative for congestion and sore throat.    Eyes: Negative for double vision and photophobia.   Respiratory: Positive for cough (chronic). Negative for shortness of breath.    Cardiovascular: Negative for chest pain and leg swelling.   Gastrointestinal: Positive for abdominal pain (right upper), blood in stool and diarrhea. Negative for constipation, nausea and vomiting.   Genitourinary: Negative for dysuria and frequency.   Musculoskeletal: Positive for back pain. Negative for myalgias and neck pain.   Neurological: Negative for dizziness and headaches.   Psychiatric/Behavioral: Negative for depression and hallucinations.       Objective Data:   Physical Exam:   Vitals:   Temp:  [36.6 °C (97.8 °F)-37.2 °C (99 °F)]  36.7 °C (98.1 °F)  Pulse:  [70-83] 70  Resp:  [15-18] 15  BP: (103-120)/(57-63) 104/58  SpO2:  [91 %-97 %] 94 %  Physical Exam   Constitutional: He is oriented to person, place, and time and well-developed, well-nourished, and in no distress.   HENT:   Head: Normocephalic and atraumatic.   Eyes: Pupils are equal, round, and reactive to light. EOM are normal.   Neck: Normal range of motion. Neck supple.   Cardiovascular: Normal rate, regular rhythm and normal heart sounds.   No murmur heard.  Pulmonary/Chest: Effort normal and breath sounds normal. No respiratory distress.   Abdominal: Soft. Bowel sounds are normal. He exhibits no distension. There is no abdominal tenderness.   Musculoskeletal: Normal range of motion.         General: No edema.   Neurological: He is alert and oriented to person, place, and time.   Skin: Skin is warm and dry.   Psychiatric: Mood and affect normal.     Labs:   See results    Imaging:   EC-ECHOCARDIOGRAM COMPLETE W/O CONT   Final Result      CT-CTA CHEST PULMONARY ARTERY W/ RECONS   Final Result         1.  Large right main pulmonary embolus extending into the right lower and middle lobe segmental and subsegmental branches.   2.  Patchy right lung base opacity, consider infiltrate, could represent changes related to pulmonary infarct.   3.  Atherosclerosis.      These findings were discussed with the patient's clinician, Turner Murphy, on 6/11/2020 2:14 AM.      CT-ABDOMEN-PELVIS WITH   Final Result         1.  Changes of the descending colon, sigmoid, and rectum. Appearance most compatible with proctocolitis. Consider inflammatory or infectious etiologies.   2.  Atherosclerosis          Acute pulmonary embolism without acute cor pulmonale (HCC)  Assessment & Plan  - Elevated D-Dimer, CTPE positive for pulmonary embolism. Hemodynamically stable   - PESI score of 73, Class II, Low Risk: 1.7-3.5% 30-day mortality in this group.  - Negative Troponin, no Right heart strain   - No recent  surgery, immobilization or h/o cancer. Positive family history of blood clot in his mother  - Lovenox given in ED, switched to Xarelto   - Transthoracic echocardiogram is normal compared to previous imaging    Plan  -Continue Xarelto twice daily  -Monitor for any bleeding    Ulcerative colitis (HCC)  Assessment & Plan  -Uncontrolled Ulcerative Colitis - still experiencing flares, currently on Stelara [id number] and Mercaptopurine,and budesonide  -Continue home medication - Budesonide and mercaptopurine  -GI evaluation    Macrocytic anemia  Assessment & Plan  -Iron panel, iron 42, ferritin 59.9, percentage saturation 22, TIBC 195  -Normal B12 and folic acid  -Continue to monitor    Hypothyroidism- (present on admission)  Assessment & Plan  - Continue with home medication

## 2020-06-11 NOTE — PROGRESS NOTES
2 RN Skin Check    2 RN skin check complete.   The following interventions in place Pillows and Lotion.    Intact

## 2020-06-11 NOTE — PROGRESS NOTES
12 hr chart check.     Monitor summary: NSR     Admitted 0415. No pain. No sob on ra. Ad malka. Needs addressed and questions answered. No complaints overnight.

## 2020-06-11 NOTE — SENIOR ADMIT NOTE
Senior Admit Note    65-year-old man with past medical history of ulcerative colitis diagnosed 3 years ago (follows with GI Dr. Hilton, on ustekinumab every 8 weeks, last injection 4/29/2020, next injection due 6/28/2020, 6-mercaptopurine of uncertain dose, budesonide 2 mg daily), hypothyroidism, GERD, dyslipidemia directed by Dr. Hilton to present to the ED due to fever of 102 F at home.  Also complained of mild abdominal pain that stretches across lower abdominal region and around the back for past several days.    Regarding fever, patient states that had subjective fever chronically, associates them with ulcerative colitis flares - treatment of ulcerative colitis has been difficult, as patient has developed antibodies to Humira, then Entyvio, now on Stelara since February 2020 without adequate control of symptoms, as still having bloody, loose stools 2-3x daily, but up to 20x daily at times.  Apparently had proctitis / sigmoiditis at time of diagnosis, but last colonoscopy done at [facility] in July 2019 showed pancolitis, most recent sigmoidoscopy done by Dr. Hilton in December 2019, scheduled to follow up with Dr. Hilton on 6/15/2020.  Patient only started taking temperature in May 2020, during which he came in for an outpatient blood transfusion due to low hemoglobin and subsequently developed a fever.  States that usually runs at low-grade fever daily  F, but today noted higher than usual at 102 F, which prompted this ED visit.  Otherwise no new overt ill symptoms.  Work-up in ED included CT-CTA chest pulmonary artery, which showed large right main pulmonary embolus extending into the right lower and middle lobe segmental and subsegmental branches.  Possibly provoked due to active ulcerative colitis.  Of note, patient had noted left lower extremity swelling back in November 2019, since resolved - reportedly work-up in [place] had been negative for DVT at that time.  Mother has history  of multiple VTEs of uncertain etiology.  No personal history of blood clots or cancer in the past.    Etiology of fever possibly secondary to combination of active ulcerative colitis, pulmonary embolism.  Stelara itself may induce fever.  Lower suspicion for infection given chronicity of fever without acute change in symptoms.  Ulcerative colitis does increase risk of malignancy, as does biologic agents, but given recent endoscopy and no overt abnormalities aside from expected suppressed cell lines from immunosuppression and autoimmune disease, of lower suspicion.  Blood cultures drawn already in ED - will obtain ESR, CRP, stool WBCs / calprotectin / lactoferrin as well to assess disease activity.  Hold bowel regimen.  Start Xarelto for pulmonary embolism - no evidence of hemodynamic instability, elevation of troponin T, or NT-proBNP that would indicate need for thrombolytic therapy or embolectomy, but will request echo to complete work-up.  Would need to carefully monitor for increased / overt bleed while on anticoagulation given active ulcerative colitis, but no evidence of significant active bleed at this time.  Consider coagulopathy work-up as outpatient after discontinuation of anticoagulation given family history.    Regarding mild abdominal pain, uncertain etiology - abdominal exam benign, liver enzymes and lipase unremarkable, CT abdomen pelvis with contrast showed known proctocolitis, but otherwise unrevealing.  Possibly secondary to combination of active ulcerative colitis and referred pain from pulmonary embolism.  Monitor for now.

## 2020-06-12 ENCOUNTER — PATIENT OUTREACH (OUTPATIENT)
Dept: HEALTH INFORMATION MANAGEMENT | Facility: OTHER | Age: 65
End: 2020-06-12

## 2020-06-12 VITALS
RESPIRATION RATE: 18 BRPM | SYSTOLIC BLOOD PRESSURE: 102 MMHG | HEIGHT: 74 IN | OXYGEN SATURATION: 95 % | TEMPERATURE: 98.3 F | WEIGHT: 162.7 LBS | HEART RATE: 76 BPM | BODY MASS INDEX: 20.88 KG/M2 | DIASTOLIC BLOOD PRESSURE: 53 MMHG

## 2020-06-12 LAB
ALBUMIN SERPL BCP-MCNC: 3 G/DL (ref 3.2–4.9)
ALBUMIN/GLOB SERPL: 0.9 G/DL
ALP SERPL-CCNC: 47 U/L (ref 30–99)
ALT SERPL-CCNC: 11 U/L (ref 2–50)
ANION GAP SERPL CALC-SCNC: 9 MMOL/L (ref 7–16)
ANISOCYTOSIS BLD QL SMEAR: ABNORMAL
AST SERPL-CCNC: 15 U/L (ref 12–45)
BASO STIPL BLD QL SMEAR: NORMAL
BASOPHILS # BLD AUTO: 1.1 % (ref 0–1.8)
BASOPHILS # BLD: 0.03 K/UL (ref 0–0.12)
BILIRUB SERPL-MCNC: 0.6 MG/DL (ref 0.1–1.5)
BUN SERPL-MCNC: 9 MG/DL (ref 8–22)
CALCIUM SERPL-MCNC: 8.8 MG/DL (ref 8.5–10.5)
CHLORIDE SERPL-SCNC: 100 MMOL/L (ref 96–112)
CO2 SERPL-SCNC: 27 MMOL/L (ref 20–33)
COMMENT 1642: NORMAL
CREAT SERPL-MCNC: 0.63 MG/DL (ref 0.5–1.4)
EOSINOPHIL # BLD AUTO: 0.16 K/UL (ref 0–0.51)
EOSINOPHIL NFR BLD: 5.8 % (ref 0–6.9)
ERYTHROCYTE [DISTWIDTH] IN BLOOD BY AUTOMATED COUNT: 82.1 FL (ref 35.9–50)
GLOBULIN SER CALC-MCNC: 3.3 G/DL (ref 1.9–3.5)
GLUCOSE SERPL-MCNC: 102 MG/DL (ref 65–99)
HCT VFR BLD AUTO: 28.1 % (ref 42–52)
HGB BLD-MCNC: 9.2 G/DL (ref 14–18)
IMM GRANULOCYTES # BLD AUTO: 0.02 K/UL (ref 0–0.11)
IMM GRANULOCYTES NFR BLD AUTO: 0.7 % (ref 0–0.9)
LYMPHOCYTES # BLD AUTO: 0.31 K/UL (ref 1–4.8)
LYMPHOCYTES NFR BLD: 11.2 % (ref 22–41)
MACROCYTES BLD QL SMEAR: ABNORMAL
MCH RBC QN AUTO: 36.7 PG (ref 27–33)
MCHC RBC AUTO-ENTMCNC: 32.7 G/DL (ref 33.7–35.3)
MCV RBC AUTO: 112 FL (ref 81.4–97.8)
MICROCYTES BLD QL SMEAR: ABNORMAL
MONOCYTES # BLD AUTO: 0.31 K/UL (ref 0–0.85)
MONOCYTES NFR BLD AUTO: 11.2 % (ref 0–13.4)
MORPHOLOGY BLD-IMP: NORMAL
NEUTROPHILS # BLD AUTO: 1.93 K/UL (ref 1.82–7.42)
NEUTROPHILS NFR BLD: 70 % (ref 44–72)
NRBC # BLD AUTO: 0 K/UL
NRBC BLD-RTO: 0 /100 WBC
OVALOCYTES BLD QL SMEAR: NORMAL
PLATELET # BLD AUTO: 164 K/UL (ref 164–446)
PLATELET BLD QL SMEAR: NORMAL
PMV BLD AUTO: 9 FL (ref 9–12.9)
POIKILOCYTOSIS BLD QL SMEAR: NORMAL
POLYCHROMASIA BLD QL SMEAR: NORMAL
POTASSIUM SERPL-SCNC: 3.8 MMOL/L (ref 3.6–5.5)
PROT SERPL-MCNC: 6.3 G/DL (ref 6–8.2)
RBC # BLD AUTO: 2.51 M/UL (ref 4.7–6.1)
RBC BLD AUTO: PRESENT
SODIUM SERPL-SCNC: 136 MMOL/L (ref 135–145)
WBC # BLD AUTO: 2.8 K/UL (ref 4.8–10.8)

## 2020-06-12 PROCEDURE — A9270 NON-COVERED ITEM OR SERVICE: HCPCS | Performed by: STUDENT IN AN ORGANIZED HEALTH CARE EDUCATION/TRAINING PROGRAM

## 2020-06-12 PROCEDURE — 85025 COMPLETE CBC W/AUTO DIFF WBC: CPT

## 2020-06-12 PROCEDURE — 80053 COMPREHEN METABOLIC PANEL: CPT

## 2020-06-12 PROCEDURE — 99238 HOSP IP/OBS DSCHRG MGMT 30/<: CPT | Mod: GC | Performed by: INTERNAL MEDICINE

## 2020-06-12 PROCEDURE — 36415 COLL VENOUS BLD VENIPUNCTURE: CPT

## 2020-06-12 PROCEDURE — 700102 HCHG RX REV CODE 250 W/ 637 OVERRIDE(OP): Performed by: STUDENT IN AN ORGANIZED HEALTH CARE EDUCATION/TRAINING PROGRAM

## 2020-06-12 RX ORDER — POTASSIUM CHLORIDE 20 MEQ/1
20 TABLET, EXTENDED RELEASE ORAL ONCE
Status: COMPLETED | OUTPATIENT
Start: 2020-06-12 | End: 2020-06-12

## 2020-06-12 RX ADMIN — RIVAROXABAN 15 MG: 15 TABLET, FILM COATED ORAL at 07:33

## 2020-06-12 RX ADMIN — THERA TABS 1 TABLET: TAB at 05:15

## 2020-06-12 RX ADMIN — LEVOTHYROXINE SODIUM 137 MCG: 137 TABLET ORAL at 05:15

## 2020-06-12 RX ADMIN — BUDESONIDE 6 MG: 3 CAPSULE, GELATIN COATED ORAL at 05:15

## 2020-06-12 RX ADMIN — ATORVASTATIN CALCIUM 10 MG: 10 TABLET, FILM COATED ORAL at 05:16

## 2020-06-12 RX ADMIN — ACETAMINOPHEN 650 MG: 325 TABLET, FILM COATED ORAL at 07:00

## 2020-06-12 RX ADMIN — OMEPRAZOLE 20 MG: 20 CAPSULE, DELAYED RELEASE ORAL at 05:16

## 2020-06-12 RX ADMIN — POTASSIUM CHLORIDE 20 MEQ: 1500 TABLET, EXTENDED RELEASE ORAL at 08:58

## 2020-06-12 RX ADMIN — OMEGA-3 FATTY ACIDS CAP 1000 MG 1000 MG: 1000 CAP at 05:15

## 2020-06-12 RX ADMIN — MERCAPTOPURINE 100 MG: 50 TABLET ORAL at 05:15

## 2020-06-12 ASSESSMENT — ENCOUNTER SYMPTOMS
DIARRHEA: 0
FEVER: 0
DIZZINESS: 0
SORE THROAT: 0
LOSS OF CONSCIOUSNESS: 0
NAUSEA: 0
DOUBLE VISION: 0
SPUTUM PRODUCTION: 0
POLYDIPSIA: 0
BACK PAIN: 0
FOCAL WEAKNESS: 0
SHORTNESS OF BREATH: 0
HEADACHES: 0
WHEEZING: 0
NECK PAIN: 0
NERVOUS/ANXIOUS: 0
DIAPHORESIS: 0
CHILLS: 0
ABDOMINAL PAIN: 1
PALPITATIONS: 0
BRUISES/BLEEDS EASILY: 0
BLURRED VISION: 0
BLOOD IN STOOL: 0
FLANK PAIN: 1
ABDOMINAL PAIN: 0
VOMITING: 0
COUGH: 0
COUGH: 1

## 2020-06-12 NOTE — PROGRESS NOTES
Patient A&Ox4. Independent with ambulation; steady on feet. Patient reported a bowel movement around 2100 on 6/11 that was reported to not have any blood. Mild, tolerable pain related to PE reported with deep and heavy breathing, but otherwise no reports of pain. VSS stable.

## 2020-06-12 NOTE — DISCHARGE PLANNING
LSW faxed scripts to [facility].     LSW will f/u to see if meds are covered with insurance

## 2020-06-12 NOTE — PROGRESS NOTES
Monitor summary: SR/ST , OH 0.16, QRS 0.08, QT 0.36, with rare PVCs per strip from monitor room.

## 2020-06-12 NOTE — DISCHARGE PLANNING
Meds-to-Beds: Discharge prescription orders listed below delivered to patient's bedside. RN notified. Patient counseled.  Patient verbalized understanding to take Xarelto 15 mg tablets BID and then take Xarelto 20 mg tablets once the 15 mg tablet course is completed.      Mic Stroud Harvey   Home Medication Instructions CASSANDRA:[id number]    Printed on:06/12/20 8513   Medication Information                      rivaroxaban (XARELTO) 15 MG Tab tablet  Take 1 Tab by mouth 2 times a day, with meals for 19 days.             rivaroxaban (XARELTO) 20 MG Tab tablet  Take 1 Tab by mouth with dinner.               Michelle Moreno, PharmD

## 2020-06-12 NOTE — DISCHARGE PLANNING
LSW confirmed with [facility] that meds have been received. Pharmcy will callback LSW once insurance is ran.     LSW called pharmacy. Pharmacy will call LSW when they are done and delivering meds.

## 2020-06-12 NOTE — NON-PROVIDER
Daily Progress Note:     Date of Service: 6/12/2020  Primary Team: [facility] Team and [facility] Gray Team   Attending: Conor Cedeno M.D.   Senior Resident: Dr. Bautista  Intern: Dr. Nicole García  Contact:  [phone]    Chief Complaint:   -Fever  -Abdominal pain    Subjective: Arsen is a 65 y.o. male with past medical history of uncontroled ulcerative colitis who presented 6/10/2020 with one month of gradually worsening fevers over the past month, Tmax 100F, and a dull right upper abdominal pain worse with deep inspiration found to have right pulmonary embolism on chest CT.       Overnight:  No acute events. Patient has not had a bloody bowel movement since yesterday morning. His right sided chest and flank pain is improving and relieve with Tylenol. No fever, chills, abdominal pain, nausea, vomiting, or dyspnea.     Consultants/Specialty:  None    Review of Systems:    Review of Systems   Constitutional: Negative for chills, diaphoresis and fever.   HENT: Negative for congestion.    Eyes: Negative for blurred vision and double vision.   Respiratory: Positive for cough. Negative for sputum production, shortness of breath and wheezing.    Cardiovascular: Positive for chest pain. Negative for palpitations and leg swelling.   Gastrointestinal: Negative for abdominal pain, blood in stool, diarrhea, nausea and vomiting.   Genitourinary: Positive for flank pain. Negative for dysuria and urgency.   Musculoskeletal: Negative for back pain.   Neurological: Negative for dizziness and headaches.       Objective Data:   Physical Exam:   Vitals:   Temp:  [36.7 °C (98 °F)-37.6 °C (99.7 °F)] 36.7 °C (98 °F)  Pulse:  [70-90] 71  Resp:  [15-20] 18  BP: ()/(54-62) 100/56  SpO2:  [91 %-95 %] 95 %    Physical Exam  Constitutional:       General: He is not in acute distress.     Appearance: Normal appearance.   HENT:      Head: Normocephalic and atraumatic.   Eyes:      Extraocular Movements: Extraocular movements  intact.      Conjunctiva/sclera: Conjunctivae normal.   Neck:      Musculoskeletal: Normal range of motion and neck supple.   Cardiovascular:      Rate and Rhythm: Normal rate and regular rhythm.      Pulses: Normal pulses.   Pulmonary:      Effort: Pulmonary effort is normal. No respiratory distress.      Breath sounds: No wheezing, rhonchi or rales.   Abdominal:      General: Abdomen is flat.      Palpations: Abdomen is soft.      Tenderness: There is no abdominal tenderness.   Musculoskeletal: Normal range of motion.   Skin:     General: Skin is warm and dry.      Capillary Refill: Capillary refill takes less than 2 seconds.   Neurological:      General: No focal deficit present.      Mental Status: He is alert.      Cranial Nerves: No cranial nerve deficit.   Psychiatric:         Mood and Affect: Mood normal.         Behavior: Behavior normal.           Labs:      Ref. Range 6/12/2020 04:14   Sodium Latest Ref Range: 135 - 145 mmol/L 136   Potassium Latest Ref Range: 3.6 - 5.5 mmol/L 3.8   Chloride Latest Ref Range: 96 - 112 mmol/L 100   Co2 Latest Ref Range: 20 - 33 mmol/L 27   Anion Gap Latest Ref Range: 7.0 - 16.0  9.0   Glucose Latest Ref Range: 65 - 99 mg/dL 102 (H)   Bun Latest Ref Range: 8 - 22 mg/dL 9   Creatinine Latest Ref Range: 0.50 - 1.40 mg/dL 0.63   GFR If  Latest Ref Range: >60 mL/min/1.73 m 2 >60   GFR If Non  Latest Ref Range: >60 mL/min/1.73 m 2 >60   Calcium Latest Ref Range: 8.5 - 10.5 mg/dL 8.8   AST(SGOT) Latest Ref Range: 12 - 45 U/L 15   ALT(SGPT) Latest Ref Range: 2 - 50 U/L 11   Alkaline Phosphatase Latest Ref Range: 30 - 99 U/L 47   Total Bilirubin Latest Ref Range: 0.1 - 1.5 mg/dL 0.6   Albumin Latest Ref Range: 3.2 - 4.9 g/dL 3.0 (L)   Total Protein Latest Ref Range: 6.0 - 8.2 g/dL 6.3   Globulin Latest Ref Range: 1.9 - 3.5 g/dL 3.3   A-G Ratio Latest Units: g/dL 0.9     Imaging:   FINDINGS  Left Ventricle  The left ventricle was normal in size and  function.  Normal left   ventricular wall thickness. Left ventricular ejection fraction is   visually estimated to be 65%. Normal regional wall motion. Normal   diastolic function.     Right Ventricle  The right ventricle was normal in size and function.     Right Atrium  Dilated right atrium. Dilated inferior vena cava with inspiratory   collapse.     Left Atrium  The left atrium is normal in size.  Left atrial volume index is 24    mL/sq m.     Mitral Valve  Structurally normal mitral valve without significant stenosis or   regurgitation.     Aortic Valve  Structurally normal aortic valve without significant stenosis or   regurgitation.     Tricuspid Valve  Structurally normal tricuspid valve without significant stenosis. Trace   regurgitation.  Right atrial pressure is estimated to be 8 mmHg.   Estimated right ventricular systolic pressure is 20 mmHg.     Pulmonic Valve  Structurally normal pulmonic valve without significant stenosis. Mild   regurgitation.     Pericardium  Normal pericardium without effusion.     Aorta  The aortic root is normal.  Ascending aorta diameter is 3.7 cm.     Reuben Santos M.D.  (Electronically Signed)  Final Date:     11 June 2020                   11:41     Ref. Range 6/11/2020 09:55   COVID Order Status Unknown Received   SARS-CoV-2 by PCR Latest Ref Range: NotDetected  NotDetected   SARS-CoV-2 Source Unknown NP Swab       Pulmonary Embolism:  CTA Chest- large right main PE extending into RL and RM lobe segmental and subsegmental branches. Elevated D-Dimer. Hemodynamically stable. PESI score 73.  -Rivaroxaban 15mg BID for 21 days total followed by 20mg QDay with meals   -Tylenol for pain  -Guaifenesin PRN  -Consider coagulopathy workup outpatient  -Discharge home today     Ulcerative colitis:  Diagnosed 3 years ago, uncontrolled. Followed by Dr. Hilton. Colonoscopy July 2019, Sigmoioscopy 06/15/20     -Continue home medications Usetekinub E1fzlia, mercaptopurine, budesonide     -Followup with Dr. Hilton on 06/15/20     Macrocytic anemia:  Most likely secondary to 6-Mercaptopurine. Normal B12 and folic acid. Iron panel, iron 42, ferritin 59.9, percentage saturation 22, TIBC 195  -Continue to monitor  -Transfuse if Hcb <7     Hypothyroidism:  -Synthroid 150mch QAM

## 2020-06-12 NOTE — PROGRESS NOTES
Internal Medicine Interval Note  Note Author: Sylvie Rivera M.D.     Name Arsen Stroud     1955   Age/Sex 65 y.o. male   MRN [id number]   Code Status FULL     After 5PM or if no immediate response to page, please call for cross-coverage  Attending/Team: Ryley See Patient List for primary contact information  Call [phone] to page    1st Call - Day Intern (R1):    2nd Call - Day Sr. Resident (R2/R3):   Lily         Reason for interval visit  (Principal Problem)   Acute pulmonary embolism  Active inflammatory bowel disease    Interval Problem Daily Status Update  (24 hours, problem oriented, brief subjective history, new lab/imaging data pertinent to that problem)   No acute events overnight. Resting comfortably in bed, in no acute distress.   Maintaining oxygen saturations in the high 90s on room air.   Denies fevers, chills, shortness of breath, or chest pain.     Review of Systems   Constitutional: Negative for chills and fever.   HENT: Negative for congestion and sore throat.    Eyes: Negative for blurred vision and double vision.   Respiratory: Negative for cough and shortness of breath.    Cardiovascular: Negative for chest pain and palpitations.   Gastrointestinal: Positive for abdominal pain (Minimal to mild; chronic). Negative for diarrhea, nausea and vomiting.   Genitourinary: Negative for dysuria and urgency.   Musculoskeletal: Negative for back pain and neck pain.   Skin: Negative for itching and rash.   Neurological: Negative for dizziness, focal weakness and loss of consciousness.   Endo/Heme/Allergies: Negative for polydipsia. Does not bruise/bleed easily.   Psychiatric/Behavioral: Negative for suicidal ideas. The patient is not nervous/anxious.        Disposition/Barriers to discharge:   Discharge home today    Consultants/Specialty  None  PCP: Wolfgang Almaguer M.D.    Quality Measures  Quality-Core Measures   Reviewed items::  EKG reviewed, Medications  reviewed, Radiology images reviewed and Labs reviewed  Mei catheter::  No Mei  DVT prophylaxis pharmacological::  Enoxaparin (Lovenox)  Ulcer Prophylaxis::  Yes    Physical Exam       Vitals:    06/11/20 1945 06/11/20 2342 06/12/20 0353 06/12/20 0800   BP: 107/56 103/54 100/56 102/53   Pulse: 90 80 71 76   Resp: 20 18 18 18   Temp: 37.6 °C (99.7 °F) 36.8 °C (98.3 °F) 36.7 °C (98 °F) 36.8 °C (98.3 °F)   TempSrc:    Temporal   SpO2: 94% 95% 95% 95%   Weight:       Height:         Body mass index is 20.89 kg/m².    Oxygen Therapy:  Pulse Oximetry: 95 %, O2 (LPM): 0, O2 Delivery Device: None - Room Air    Physical Exam   Constitutional: He is oriented to person, place, and time and well-developed, well-nourished, and in no distress.   HENT:   Head: Normocephalic and atraumatic.   Eyes: Pupils are equal, round, and reactive to light. EOM are normal.   Neck: Normal range of motion. Neck supple.   Cardiovascular: Normal heart sounds and intact distal pulses.   Pulmonary/Chest: Effort normal. No respiratory distress. He has no wheezes. He exhibits no tenderness.   Abdominal: Soft. Bowel sounds are normal.   Musculoskeletal: Normal range of motion.         General: No tenderness or edema.   Neurological: He is alert and oriented to person, place, and time. GCS score is 15.   Skin: Skin is warm and dry. He is not diaphoretic.   Psychiatric: Mood and affect normal.     Assessment/Plan     Acute pulmonary embolism without acute cor pulmonale (HCC)  Assessment & Plan  Elevated D-Dimer on presentation   CTPE positive for pulmonary embolism   PESI score of 73, Class II, Low Risk: 1.7-3.5% 30-day mortality in this group.  Negative Troponin, no right heart strain per TTE 06/11/2020  COVID negative  Hemodynamically stable, not requiring supplemental oxygen  No recent surgery, immobilization or h/o cancer.   Positive family history of blood clot in his mother  PE likely secondary to IBD  - Discharge home today w/ Xarelto 15mg BID  until 07/01/2020, followed by Xarelto 20mg daily  - Monitor for any bleeding  - Follow up with PCP in 2 weeks    Ulcerative colitis (HCC)  Assessment & Plan  Active Ulcerative Colitis   Patient continues to experience flares  High risk for VTE  Discharge home today w/ instructions to:  - Continue Stelara [id number], Mercaptopurine, and budesonide  - Outpatient follow up with GI as scheduled  - Outpatient PCP follow up in 2 weeks  - Continue Xarelto as prescribed     Macrocytic anemia  Assessment & Plan  Iron panel obtained inpatient  Iron 42, ferritin 59.9, percentage saturation 22, TIBC 195  Vitamin B12 and folic acid levels WNL  Likely secondary to IBD therapy  - PCP to follow up and monitor    Hypothyroidism- (present on admission)  Assessment & Plan  Continue Levothyroxine at home

## 2020-06-12 NOTE — DISCHARGE SUMMARY
Internal Medicine Discharge Summary  Note Author: Sylvie Rivera M.D.       Name Arsen Stroud     1955   Age/Sex 65 y.o. male   MRN [id number]         Admit Date:  6/10/2020       Discharge Date:   2020    Service:   [facility] Internal Medicine Gray Team  Attending Physician(s):   Conor Cedeno MD       Senior Resident(s):   Odilia Bautista MD  Simone Resident(s):   SANTINO Rivera MD  PCP: Wolfgang Almaguer M.D.      Primary Diagnosis:   Acute RIGHT main stem pulmonary embolism    Secondary Diagnoses:                Active Problems:    Acute pulmonary embolism without acute cor pulmonale (HCC) POA: Unknown    Ulcerative colitis (HCC) POA: Unknown    Hypothyroidism POA: Yes    Macrocytic anemia POA: Unknown  Resolved Problems:    * No resolved hospital problems. *      Hospital Summary (Brief Narrative):       65 year old gentleman w/ history of ulcerative colitis presented to the [facility] ED complaining of fevers, pleuritic chest pain, and abdominal discomfort for the past 7-10 days. Patient was hemodynamically stable on arrival. He was noted to have elevated D-dimer however, warranting CTPE which demonstrated a large RIGHT main pulmonary embolus extending into the RIGHT lower and middle segmental and subsegmental branches. Although the patient had no prior history of travel or recent surgeries, his pulmonary embolism is likely secondary to his underlying active inflammatory bowel disease. Lovenox anticoagulation was promptly initiated and switched to oral Xarelto the following morning.     A transthoracic echocardiogram was performed on 2020 and demonstrated a LVEF of 65% without any identifiable RIGHT heart strain w/ an RVSP of 20mmHg. The patient tolerated Xarelto well and had no active complaints or issues over the course of the day. He expressed an understanding of what a pulmonary embolus was and the need for anticoagulation.     On 2020, the patient reported felling  well, was tolerating a regular diet, maintaining oxygen saturations in the 90s on room air, and have regular non-bloody bowel movements. At this time, he has received maximal benefit for this hospitalization and will be discharged home with instructions to follow up with his PCP in 2 weeks and continue Xarelto 15mg BID until July 1st after which he will switch to Xarelto 20mg daily. The patient is also scheduled to follow up with GI on Monday 06/15/2020.     Patient /Hospital Summary (Details -- Problem Oriented) :          Acute pulmonary embolism without acute cor pulmonale (HCC)  Assessment & Plan  Elevated D-Dimer on presentation   CTPE positive for pulmonary embolism   PESI score of 73, Class II, Low Risk: 1.7-3.5% 30-day mortality in this group.  Negative Troponin, no right heart strain per TTE 06/11/2020  Hemodynamically stable, not requiring supplemental oxygen  No recent surgery, immobilization or h/o cancer.   Positive family history of blood clot in his mother  PE likely secondary to IBD  - Discharge home today w/ Xarelto 15mg BID until 07/01/2020, followed by Xarelto 20mg daily  - Monitor for any bleeding  - Follow up with PCP in 2 weeks    Ulcerative colitis (HCC)  Assessment & Plan  Active Ulcerative Colitis   Patient continues to experience flares  High risk for VTE  Discharge home today w/ instructions to:  - Continue Stelara [id number], Mercaptopurine, and budesonide  - Outpatient follow up with GI as scheduled  - Outpatient PCP follow up in 2 weeks  - Continue Xarelto as prescribed     Macrocytic anemia  Assessment & Plan  Iron panel obtained inpatient  Iron 42, ferritin 59.9, percentage saturation 22, TIBC 195  Vitamin B12 and folic acid levels WNL  Likely secondary to IBD therapy  - PCP to follow up and monitor    Hypothyroidism  Assessment & Plan  Continue Levothyroxine at home      Consultants:     None    Procedures:        Transthoracic Echocardiogram 06/11/2020     CONCLUSIONS  Prior study done  2019. Compared to the report of the study done -   there has been no significant change.   Normal left ventricular systolic function.  Left ventricular ejection fraction is visually estimated to be 65%.  The right ventricle was normal in size and function.  No significant valve abnormalities.   Estimated right ventricular systolic pressure is 20 mmHg.        ANAMARIA CORDOVA  Exam Date:         2020                      09:18  Exam Location:     Inpatient  Priority:          Routine     Ordering Physician:        DENAE PRAKASH  Referring Physician:       DWAIN Shabazz  Sonographer:               Elias Bacon RDCS,                              RVT     Age:    65     Gender:    M  MRN:    [id number]  :    1955  BSA:    1.99   Ht (in):    74     Wt (lb):    162  Exam Type:     Complete     Indications:     Pulmonary Embolus  ICD Codes:       415.19     CPT Codes:       43558     BP:   109    /   58     HR:   75  Technical Quality:       Fair     MEASUREMENTS  (Male / Female) Normal Values  2D ECHO  LV Diastolic Diameter PLAX        4.8 cm                4.2 - 5.9 / 3.9 - 5.3   cm  LV Systolic Diameter PLAX         2.7 cm                2.1 - 4.0 cm  IVS Diastolic Thickness           1 cm                    LVPW Diastolic Thickness          1 cm                    RV Diameter 4C                    3.2 cm                2.5 - 2.9 cm  LVOT Diameter                     2.2 cm                  RA Diameter                       4.1 cm                  Estimated LV Ejection Fraction    65 %                    LV Ejection Fraction MOD BP       66.5 %                >= 55  %  LV Ejection Fraction MOD 4C       66.8 %                  LV Ejection Fraction MOD 2C       65.4 %                  LA Volume Index                   24.2 cm3/m2           16 - 28 cm3/m2     DOPPLER  AV Peak Velocity                  1.6 m/s                 AV Peak Gradient                  9.9 mmHg                AV Mean Gradient                   5.1 mmHg                LVOT Peak Velocity                0.97 m/s                AV Area Cont Eq vti               2.7 cm2                 Mitral E Point Velocity           0.67 m/s                Mitral E to A Ratio               1.2                     MV Pressure Half Time             87.5 ms                 MV Area PHT                       2.5 cm2                 MV Deceleration Time              302 ms                  TR Peak Velocity                  179 cm/s                   * Indicates values subject to auto-interpretation  LV EF:  65    %     FINDINGS  Left Ventricle  The left ventricle was normal in size and function.  Normal left   ventricular wall thickness. Left ventricular ejection fraction is   visually estimated to be 65%. Normal regional wall motion. Normal   diastolic function.     Right Ventricle  The right ventricle was normal in size and function.     Right Atrium  Dilated right atrium. Dilated inferior vena cava with inspiratory   collapse.     Left Atrium  The left atrium is normal in size.  Left atrial volume index is 24    mL/sq m.     Mitral Valve  Structurally normal mitral valve without significant stenosis or   regurgitation.     Aortic Valve  Structurally normal aortic valve without significant stenosis or   regurgitation.     Tricuspid Valve  Structurally normal tricuspid valve without significant stenosis. Trace   regurgitation.  Right atrial pressure is estimated to be 8 mmHg.   Estimated right ventricular systolic pressure is 20 mmHg.     Pulmonic Valve  Structurally normal pulmonic valve without significant stenosis. Mild   regurgitation.     Pericardium  Normal pericardium without effusion.     Aorta  The aortic root is normal.  Ascending aorta diameter is 3.7 cm.    Imaging/ Testin/11/2020 1:46 AM     HISTORY/REASON FOR EXAM:  Abdominal pain, fever     TECHNIQUE/EXAM DESCRIPTION:  CT angiogram of the chest with contrast.     Transaxial MDCT scan of  chest post bolus 100 cc Omnipaque 350 IV administration. MIP reconstructed images were created and reviewed.     Low dose optimization technique was utilized for this CT exam including automated exposure control and adjustment of the mA and/or kV according to patient size.     COMPARISON: None     FINDINGS:     The visualized portion of the thyroid appear within normal limits.  The trachea and main stem airways are normal in caliber. There are no pathologically enlarged mediastinal lymph nodes.     The heart and pericardium appear within normal limits.   Atherosclerotic calcifications are seen. The aorta and its main branch vessels are normal in caliber and configuration.  The pulmonary arteries are well opacified. There is right main pulmonary   artery filling defect identified extending into the right lower lobe and right middle lobe segmental and subsegmental branches.     The pulmonary parenchyma demonstrates patchy right lung base opacity.     Limited views of the abdomen demonstrate no acute abnormality.     The bony structures are age appropriate.     IMPRESSION:        1.  Large right main pulmonary embolus extending into the right lower and middle lobe segmental and subsegmental branches.  2.  Patchy right lung base opacity, consider infiltrate, could represent changes related to pulmonary infarct.  3.  Atherosclerosis.    6/11/2020 1:46 AM     HISTORY/REASON FOR EXAM: Abdominal pain, unspecified; IV contrast only     TECHNIQUE/EXAM DESCRIPTION:  CT abdomen and pelvis with contrast. Sequential axial CT images were obtained from lung bases to the proximal femurs after the uneventful administration of 100 cc Omnipaque 350 intravenous contrast.     Low dose optimization technique was utilized for this CT exam including automated exposure control and adjustment of the mA and/or kV according to patient size.     COMPARISON: None     CT ABDOMEN FINDINGS:     See dedicated CT of the chest for thoracic  findings.     The liver is normal in contour. The liver is normal in size. No intrahepatic biliary ductal dilatation is noted.     The gallbladder appears within normal limits.     The spleen is unremarkable.     The pancreas is grossly normal.     Bilateral adrenal glands appear within normal limits.     Subcentimeter left upper pole renal cyst is seen, otherwise the kidneys are unremarkable.  The ureters are normal in caliber along their visualized course.     There is descending, sigmoid, and rectal wall thickening with adjacent hazy fat stranding. The appendix is not definitively identified. The small bowel is unremarkable.     The bony structures are age appropriate. Atherosclerotic calcifications are seen.     CT PELVIS FINDINGS:     The bladder is within normal limits.     IMPRESSION:        1.  Changes of the descending colon, sigmoid, and rectum. Appearance most compatible with proctocolitis. Consider inflammatory or infectious etiologies.  2.  Atherosclerosis      Discharge Medications:         Medication Reconciliation: Completed       Medication List      START taking these medications      Instructions   * rivaroxaban 15 MG Tabs tablet  Commonly known as:  XARELTO   Take 1 Tab by mouth 2 times a day, with meals for 19 days.  Dose:  15 mg     * rivaroxaban 20 MG Tabs tablet  Start taking on:  July 2, 2020  Commonly known as:  XARELTO   Take 1 Tab by mouth with dinner.  Dose:  20 mg         * This list has 2 medication(s) that are the same as other medications prescribed for you. Read the directions carefully, and ask your doctor or other care provider to review them with you.            CONTINUE taking these medications      Instructions   ALLERGY 24-HR PO   Take 1 tablet by mouth every day.  Dose:  1 tablet     atorvastatin 10 MG Tabs  Commonly known as:  LIPITOR   Take 10 mg by mouth every morning.  Dose:  10 mg     budesonide 3 MG Cpep capsule  Commonly known as:  ENTOCORT EC   Take 6 mg by mouth  every morning.  Dose:  6 mg     Calcium Carb-Cholecalciferol 600-800 MG-UNIT Tabs   Take  by mouth.     ferrous sulfate 325 (65 Fe) MG tablet   Take 325 mg by mouth 2 Times a Day.  Dose:  325 mg     fish oil 1000 MG Caps capsule   Take 1,000 mg by mouth every morning.  Dose:  1,000 mg     lansoprazole 30 MG Cpdr  Commonly known as:  PREVACID   Take 30 mg by mouth every morning. Indications: Gastroesophageal Reflux Disease  Dose:  30 mg     levothyroxine 137 MCG Tabs  Commonly known as:  SYNTHROID   Take 137 mcg by mouth Every morning on an empty stomach.  Dose:  137 mcg     MERCAPTOPURINE PO   Take 100 mg by mouth every day.  Dose:  100 mg     ONE-DAILY MULTIVITAMINS Tabs   Take 1 Tab by mouth.  Dose:  1 Tab     STELARA SC   Inject  as instructed Q 8 WEEKS.     Vitamin D3 2000 UNIT Caps   Take 1 Cap by mouth every morning.  Dose:  1 Cap            Can use .DISCHARGEMEDSLIST if going to another facility         Disposition:   Home    Diet:   Regular    Activity:   As tolerated    Instructions:      The patient was instructed to return to the ER in the event of worsening symptoms. I have counseled the patient on the importance of compliance and the patient has agreed to proceed with all medical recommendations and follow up plan indicated above.   The patient understands that all medications come with benefits and risks. Risks may include permanent injury or death and these risks can be minimized with close reassessment and monitoring.        Primary Care Provider:    Wolfgang Almaguer MD    Follow up appointment details :      Wolfgang Almaguer M.D.  [address] [facility] [address]  [phone]    In 2 weeks      GI as scheduled    Pending Studies:        None    Time spent on discharge day patient visit, preparing discharge paperwork and arranging for patient follow up.    Summary of follow up issues:   PCP to follow up w/ anticoagulation adherence/monitoring, ulcerative colitis, and macrocytic  anemia    Discharge Time (Minutes) :    45mins  Hospital Course Type:  Inpatient Stay >2 midnights      Condition on Discharge    ______________________________________________________________________    Interval history/exam for day of discharge:    No acute events overnight. Resting comfortably overnight. Patient denies any fevers, chills, shortness of breath, nausea, or vomiting.     Physical Exam:  GEN: pleasant, younger than age appearing gentleman laying in bed, in no acute distress  HEENT: AT, NC  CV: RRR, audible S1S2  RESP: CTAB, no wheezes, rhonchi  ABD: soft, NT, ND  EXT: warm, well perfused    Most Recent Labs:    Lab Results   Component Value Date/Time    WBC 2.8 (L) 06/12/2020 04:14 AM    RBC 2.51 (L) 06/12/2020 04:14 AM    HEMOGLOBIN 9.2 (L) 06/12/2020 04:14 AM    HEMATOCRIT 28.1 (L) 06/12/2020 04:14 AM    .0 (H) 06/12/2020 04:14 AM    MCH 36.7 (H) 06/12/2020 04:14 AM    MCHC 32.7 (L) 06/12/2020 04:14 AM    MPV 9.0 06/12/2020 04:14 AM    NEUTSPOLYS 70.00 06/12/2020 04:14 AM    LYMPHOCYTES 11.20 (L) 06/12/2020 04:14 AM    MONOCYTES 11.20 06/12/2020 04:14 AM    EOSINOPHILS 5.80 06/12/2020 04:14 AM    BASOPHILS 1.10 06/12/2020 04:14 AM    ANISOCYTOSIS 1+ 06/12/2020 04:14 AM      Lab Results   Component Value Date/Time    SODIUM 136 06/12/2020 04:14 AM    POTASSIUM 3.8 06/12/2020 04:14 AM    CHLORIDE 100 06/12/2020 04:14 AM    CO2 27 06/12/2020 04:14 AM    GLUCOSE 102 (H) 06/12/2020 04:14 AM    BUN 9 06/12/2020 04:14 AM    CREATININE 0.63 06/12/2020 04:14 AM      Lab Results   Component Value Date/Time    ALTSGPT 11 06/12/2020 04:14 AM    ASTSGOT 15 06/12/2020 04:14 AM    ALKPHOSPHAT 47 06/12/2020 04:14 AM    TBILIRUBIN 0.6 06/12/2020 04:14 AM    LIPASE 20 06/10/2020 09:05 PM    ALBUMIN 3.0 (L) 06/12/2020 04:14 AM    GLOBULIN 3.3 06/12/2020 04:14 AM    MACROCYTOSIS 1+ 06/12/2020 04:14 AM     No results found for: PROTHROMBTM, INR

## 2020-06-14 LAB — LACTOFERRIN STL QL IA: NEGATIVE

## 2020-06-16 LAB
BACTERIA BLD CULT: NORMAL
BACTERIA BLD CULT: NORMAL
CALPROTECTIN STL-MCNT: >1250 UG/G
SIGNIFICANT IND 70042: NORMAL
SIGNIFICANT IND 70042: NORMAL
SITE SITE: NORMAL
SITE SITE: NORMAL
SOURCE SOURCE: NORMAL
SOURCE SOURCE: NORMAL

## 2020-06-22 ENCOUNTER — APPOINTMENT (OUTPATIENT)
Dept: RADIOLOGY | Facility: MEDICAL CENTER | Age: 65
DRG: 386 | End: 2020-06-22
Attending: EMERGENCY MEDICINE
Payer: COMMERCIAL

## 2020-06-22 ENCOUNTER — HOSPITAL ENCOUNTER (INPATIENT)
Facility: MEDICAL CENTER | Age: 65
LOS: 3 days | DRG: 386 | End: 2020-06-25
Attending: EMERGENCY MEDICINE | Admitting: HOSPITALIST
Payer: COMMERCIAL

## 2020-06-22 DIAGNOSIS — K51.019 ULCERATIVE PANCOLITIS WITH COMPLICATION (HCC): ICD-10-CM

## 2020-06-22 DIAGNOSIS — D64.9 ANEMIA, UNSPECIFIED TYPE: ICD-10-CM

## 2020-06-22 DIAGNOSIS — S09.90XA CLOSED HEAD INJURY, INITIAL ENCOUNTER: ICD-10-CM

## 2020-06-22 DIAGNOSIS — D72.819 LEUKOPENIA, UNSPECIFIED TYPE: ICD-10-CM

## 2020-06-22 DIAGNOSIS — K92.2 GASTROINTESTINAL HEMORRHAGE, UNSPECIFIED GASTROINTESTINAL HEMORRHAGE TYPE: ICD-10-CM

## 2020-06-22 DIAGNOSIS — K51.911 ULCERATIVE COLITIS WITH RECTAL BLEEDING, UNSPECIFIED LOCATION (HCC): ICD-10-CM

## 2020-06-22 DIAGNOSIS — R55 SYNCOPE, UNSPECIFIED SYNCOPE TYPE: ICD-10-CM

## 2020-06-22 PROBLEM — D61.818 PANCYTOPENIA (HCC): Status: ACTIVE | Noted: 2020-06-22

## 2020-06-22 PROBLEM — Z66 DNR NO CODE (DO NOT RESUSCITATE): Status: ACTIVE | Noted: 2020-06-22

## 2020-06-22 PROBLEM — Z79.01 CHRONIC ANTICOAGULATION: Status: ACTIVE | Noted: 2020-06-22

## 2020-06-22 LAB
ABO GROUP BLD: NORMAL
ALBUMIN SERPL BCP-MCNC: 3.2 G/DL (ref 3.2–4.9)
ALBUMIN/GLOB SERPL: 1 G/DL
ALP SERPL-CCNC: 53 U/L (ref 30–99)
ALT SERPL-CCNC: 12 U/L (ref 2–50)
AMORPH CRY #/AREA URNS HPF: PRESENT /HPF
ANION GAP SERPL CALC-SCNC: 11 MMOL/L (ref 7–16)
APPEARANCE UR: ABNORMAL
APTT PPP: 39.8 SEC (ref 24.7–36)
AST SERPL-CCNC: 16 U/L (ref 12–45)
BACTERIA #/AREA URNS HPF: NEGATIVE /HPF
BARCODED ABORH UBTYP: 8400
BARCODED ABORH UBTYP: 8400
BARCODED PRD CODE UBPRD: NORMAL
BARCODED PRD CODE UBPRD: NORMAL
BARCODED UNIT NUM UBUNT: NORMAL
BARCODED UNIT NUM UBUNT: NORMAL
BASOPHILS # BLD AUTO: 1 % (ref 0–1.8)
BASOPHILS # BLD: 0.02 K/UL (ref 0–0.12)
BILIRUB SERPL-MCNC: 0.4 MG/DL (ref 0.1–1.5)
BILIRUB UR QL STRIP.AUTO: ABNORMAL
BLD GP AB SCN SERPL QL: NORMAL
BUN SERPL-MCNC: 11 MG/DL (ref 8–22)
CALCIUM SERPL-MCNC: 8.6 MG/DL (ref 8.5–10.5)
CFT BLD TEG: 12.7 MIN (ref 5–10)
CHLORIDE SERPL-SCNC: 100 MMOL/L (ref 96–112)
CLOT ANGLE BLD TEG: 61.3 DEGREES (ref 53–72)
CLOT LYSIS 30M P MA LENFR BLD TEG: 0.4 % (ref 0–8)
CO2 SERPL-SCNC: 26 MMOL/L (ref 20–33)
COLOR UR: YELLOW
COMPONENT R 8504R: NORMAL
COMPONENT R 8504R: NORMAL
CREAT SERPL-MCNC: 0.82 MG/DL (ref 0.5–1.4)
CT.EXTRINSIC BLD ROTEM: 2.2 MIN (ref 1–3)
EKG IMPRESSION: NORMAL
EOSINOPHIL # BLD AUTO: 0.19 K/UL (ref 0–0.51)
EOSINOPHIL NFR BLD: 10 % (ref 0–6.9)
EPI CELLS #/AREA URNS HPF: NEGATIVE /HPF
ERYTHROCYTE [DISTWIDTH] IN BLOOD BY AUTOMATED COUNT: 85.4 FL (ref 35.9–50)
GLOBULIN SER CALC-MCNC: 3.2 G/DL (ref 1.9–3.5)
GLUCOSE SERPL-MCNC: 97 MG/DL (ref 65–99)
GLUCOSE UR STRIP.AUTO-MCNC: NEGATIVE MG/DL
HCT VFR BLD AUTO: 20.3 % (ref 42–52)
HGB BLD-MCNC: 6.4 G/DL (ref 14–18)
HGB BLD-MCNC: 6.7 G/DL (ref 14–18)
HGB BLD-MCNC: 6.8 G/DL (ref 14–18)
INR PPP: 1.77 (ref 0.87–1.13)
KETONES UR STRIP.AUTO-MCNC: NEGATIVE MG/DL
LEUKOCYTE ESTERASE UR QL STRIP.AUTO: NEGATIVE
LYMPHOCYTES # BLD AUTO: 0.32 K/UL (ref 1–4.8)
LYMPHOCYTES NFR BLD: 17 % (ref 22–41)
MANUAL DIFF BLD: ABNORMAL
MCF BLD TEG: 67.9 MM (ref 50–70)
MCH RBC QN AUTO: 37.4 PG (ref 27–33)
MCHC RBC AUTO-ENTMCNC: 32.5 G/DL (ref 33.7–35.3)
MCV RBC AUTO: 114.8 FL (ref 81.4–97.8)
MICRO URNS: ABNORMAL
MONOCYTES # BLD AUTO: 0.15 K/UL (ref 0–0.85)
MONOCYTES NFR BLD AUTO: 8 % (ref 0–13.4)
MORPHOLOGY BLD-IMP: NORMAL
NEUTROPHILS # BLD AUTO: 1.22 K/UL (ref 1.82–7.42)
NEUTROPHILS NFR BLD: 53 % (ref 44–72)
NEUTS BAND NFR BLD MANUAL: 11 % (ref 0–10)
NITRITE UR QL STRIP.AUTO: NEGATIVE
NRBC # BLD AUTO: 0.02 K/UL
NRBC BLD-RTO: 1.1 /100 WBC
PH UR STRIP.AUTO: 5.5 [PH] (ref 5–8)
PLATELET # BLD AUTO: 134 K/UL (ref 164–446)
PMV BLD AUTO: 10.1 FL (ref 9–12.9)
POTASSIUM SERPL-SCNC: 3.8 MMOL/L (ref 3.6–5.5)
PRODUCT TYPE UPROD: NORMAL
PRODUCT TYPE UPROD: NORMAL
PROT SERPL-MCNC: 6.4 G/DL (ref 6–8.2)
PROT UR QL STRIP: NEGATIVE MG/DL
PROTHROMBIN TIME: 21.1 SEC (ref 12–14.6)
RBC # BLD AUTO: 1.82 M/UL (ref 4.7–6.1)
RBC # URNS HPF: ABNORMAL /HPF
RBC UR QL AUTO: NEGATIVE
RH BLD: NORMAL
SODIUM SERPL-SCNC: 137 MMOL/L (ref 135–145)
SP GR UR REFRACTOMETRY: 1.02
SPERM #/AREA URNS HPF: ABNORMAL /HPF
TEG ALGORITHM TGALG: ABNORMAL
TROPONIN T SERPL-MCNC: 7 NG/L (ref 6–19)
UNIT STATUS USTAT: NORMAL
UNIT STATUS USTAT: NORMAL
URATE CRY #/AREA URNS HPF: POSITIVE /HPF
UROBILINOGEN UR STRIP.AUTO-MCNC: 0.2 MG/DL
WBC # BLD AUTO: 1.9 K/UL (ref 4.8–10.8)
WBC #/AREA URNS HPF: ABNORMAL /HPF

## 2020-06-22 PROCEDURE — 85730 THROMBOPLASTIN TIME PARTIAL: CPT

## 2020-06-22 PROCEDURE — 85610 PROTHROMBIN TIME: CPT

## 2020-06-22 PROCEDURE — 30233N1 TRANSFUSION OF NONAUTOLOGOUS RED BLOOD CELLS INTO PERIPHERAL VEIN, PERCUTANEOUS APPROACH: ICD-10-PCS | Performed by: EMERGENCY MEDICINE

## 2020-06-22 PROCEDURE — 86850 RBC ANTIBODY SCREEN: CPT

## 2020-06-22 PROCEDURE — P9016 RBC LEUKOCYTES REDUCED: HCPCS

## 2020-06-22 PROCEDURE — 99285 EMERGENCY DEPT VISIT HI MDM: CPT

## 2020-06-22 PROCEDURE — A9270 NON-COVERED ITEM OR SERVICE: HCPCS | Performed by: HOSPITALIST

## 2020-06-22 PROCEDURE — 700105 HCHG RX REV CODE 258: Performed by: HOSPITALIST

## 2020-06-22 PROCEDURE — 81001 URINALYSIS AUTO W/SCOPE: CPT

## 2020-06-22 PROCEDURE — 80053 COMPREHEN METABOLIC PANEL: CPT

## 2020-06-22 PROCEDURE — 770020 HCHG ROOM/CARE - TELE (206)

## 2020-06-22 PROCEDURE — 85018 HEMOGLOBIN: CPT

## 2020-06-22 PROCEDURE — 96374 THER/PROPH/DIAG INJ IV PUSH: CPT

## 2020-06-22 PROCEDURE — 700111 HCHG RX REV CODE 636 W/ 250 OVERRIDE (IP): Performed by: HOSPITALIST

## 2020-06-22 PROCEDURE — 93005 ELECTROCARDIOGRAM TRACING: CPT

## 2020-06-22 PROCEDURE — 36415 COLL VENOUS BLD VENIPUNCTURE: CPT

## 2020-06-22 PROCEDURE — 86900 BLOOD TYPING SEROLOGIC ABO: CPT

## 2020-06-22 PROCEDURE — 36430 TRANSFUSION BLD/BLD COMPNT: CPT

## 2020-06-22 PROCEDURE — C9113 INJ PANTOPRAZOLE SODIUM, VIA: HCPCS | Performed by: HOSPITALIST

## 2020-06-22 PROCEDURE — 85027 COMPLETE CBC AUTOMATED: CPT

## 2020-06-22 PROCEDURE — 700102 HCHG RX REV CODE 250 W/ 637 OVERRIDE(OP): Performed by: EMERGENCY MEDICINE

## 2020-06-22 PROCEDURE — 700101 HCHG RX REV CODE 250: Performed by: HOSPITALIST

## 2020-06-22 PROCEDURE — 93005 ELECTROCARDIOGRAM TRACING: CPT | Performed by: EMERGENCY MEDICINE

## 2020-06-22 PROCEDURE — 71045 X-RAY EXAM CHEST 1 VIEW: CPT

## 2020-06-22 PROCEDURE — 85384 FIBRINOGEN ACTIVITY: CPT

## 2020-06-22 PROCEDURE — 70450 CT HEAD/BRAIN W/O DYE: CPT

## 2020-06-22 PROCEDURE — 85576 BLOOD PLATELET AGGREGATION: CPT

## 2020-06-22 PROCEDURE — A9270 NON-COVERED ITEM OR SERVICE: HCPCS | Performed by: EMERGENCY MEDICINE

## 2020-06-22 PROCEDURE — 700111 HCHG RX REV CODE 636 W/ 250 OVERRIDE (IP): Performed by: EMERGENCY MEDICINE

## 2020-06-22 PROCEDURE — 86923 COMPATIBILITY TEST ELECTRIC: CPT

## 2020-06-22 PROCEDURE — 86901 BLOOD TYPING SEROLOGIC RH(D): CPT

## 2020-06-22 PROCEDURE — 700102 HCHG RX REV CODE 250 W/ 637 OVERRIDE(OP): Performed by: HOSPITALIST

## 2020-06-22 PROCEDURE — 85347 COAGULATION TIME ACTIVATED: CPT

## 2020-06-22 PROCEDURE — 99223 1ST HOSP IP/OBS HIGH 75: CPT | Performed by: HOSPITALIST

## 2020-06-22 PROCEDURE — 85007 BL SMEAR W/DIFF WBC COUNT: CPT

## 2020-06-22 PROCEDURE — 84484 ASSAY OF TROPONIN QUANT: CPT

## 2020-06-22 RX ORDER — LABETALOL HYDROCHLORIDE 5 MG/ML
10 INJECTION, SOLUTION INTRAVENOUS EVERY 4 HOURS PRN
Status: DISCONTINUED | OUTPATIENT
Start: 2020-06-22 | End: 2020-06-22

## 2020-06-22 RX ORDER — PROCHLORPERAZINE EDISYLATE 5 MG/ML
5-10 INJECTION INTRAMUSCULAR; INTRAVENOUS EVERY 4 HOURS PRN
Status: DISCONTINUED | OUTPATIENT
Start: 2020-06-22 | End: 2020-06-25 | Stop reason: HOSPADM

## 2020-06-22 RX ORDER — LABETALOL HYDROCHLORIDE 5 MG/ML
10 INJECTION, SOLUTION INTRAVENOUS EVERY 4 HOURS PRN
Status: DISCONTINUED | OUTPATIENT
Start: 2020-06-22 | End: 2020-06-25 | Stop reason: HOSPADM

## 2020-06-22 RX ORDER — BISACODYL 10 MG
10 SUPPOSITORY, RECTAL RECTAL
Status: DISCONTINUED | OUTPATIENT
Start: 2020-06-22 | End: 2020-06-25 | Stop reason: HOSPADM

## 2020-06-22 RX ORDER — POLYETHYLENE GLYCOL 3350 17 G/17G
1 POWDER, FOR SOLUTION ORAL
Status: DISCONTINUED | OUTPATIENT
Start: 2020-06-22 | End: 2020-06-25 | Stop reason: HOSPADM

## 2020-06-22 RX ORDER — ACETAMINOPHEN 500 MG
1000 TABLET ORAL ONCE
Status: COMPLETED | OUTPATIENT
Start: 2020-06-22 | End: 2020-06-22

## 2020-06-22 RX ORDER — ONDANSETRON 2 MG/ML
4 INJECTION INTRAMUSCULAR; INTRAVENOUS EVERY 4 HOURS PRN
Status: DISCONTINUED | OUTPATIENT
Start: 2020-06-22 | End: 2020-06-25 | Stop reason: HOSPADM

## 2020-06-22 RX ORDER — SODIUM CHLORIDE 9 MG/ML
INJECTION, SOLUTION INTRAVENOUS CONTINUOUS
Status: ACTIVE | OUTPATIENT
Start: 2020-06-22 | End: 2020-06-23

## 2020-06-22 RX ORDER — PROMETHAZINE HYDROCHLORIDE 25 MG/1
12.5-25 TABLET ORAL EVERY 4 HOURS PRN
Status: DISCONTINUED | OUTPATIENT
Start: 2020-06-22 | End: 2020-06-25 | Stop reason: HOSPADM

## 2020-06-22 RX ORDER — MERCAPTOPURINE 50 MG/1
100 TABLET ORAL DAILY
Status: DISCONTINUED | OUTPATIENT
Start: 2020-06-23 | End: 2020-06-25 | Stop reason: HOSPADM

## 2020-06-22 RX ORDER — ZINC SULFATE 50(220)MG
220 CAPSULE ORAL ONCE
Status: COMPLETED | OUTPATIENT
Start: 2020-06-22 | End: 2020-06-22

## 2020-06-22 RX ORDER — ONDANSETRON 4 MG/1
4 TABLET, ORALLY DISINTEGRATING ORAL EVERY 4 HOURS PRN
Status: DISCONTINUED | OUTPATIENT
Start: 2020-06-22 | End: 2020-06-25 | Stop reason: HOSPADM

## 2020-06-22 RX ORDER — DIPHENHYDRAMINE HYDROCHLORIDE 50 MG/ML
25 INJECTION INTRAMUSCULAR; INTRAVENOUS ONCE
Status: COMPLETED | OUTPATIENT
Start: 2020-06-22 | End: 2020-06-22

## 2020-06-22 RX ORDER — MERCAPTOPURINE 50 MG/1
100 TABLET ORAL DAILY
COMMUNITY
End: 2022-01-31

## 2020-06-22 RX ORDER — PROMETHAZINE HYDROCHLORIDE 25 MG/1
12.5-25 SUPPOSITORY RECTAL EVERY 4 HOURS PRN
Status: DISCONTINUED | OUTPATIENT
Start: 2020-06-22 | End: 2020-06-25 | Stop reason: HOSPADM

## 2020-06-22 RX ORDER — SODIUM CHLORIDE 9 MG/ML
INJECTION, SOLUTION INTRAVENOUS CONTINUOUS
Status: DISCONTINUED | OUTPATIENT
Start: 2020-06-22 | End: 2020-06-24

## 2020-06-22 RX ORDER — ACETAMINOPHEN 325 MG/1
650 TABLET ORAL EVERY 6 HOURS PRN
Status: DISCONTINUED | OUTPATIENT
Start: 2020-06-22 | End: 2020-06-25 | Stop reason: HOSPADM

## 2020-06-22 RX ORDER — AMOXICILLIN 250 MG
2 CAPSULE ORAL 2 TIMES DAILY
Status: DISCONTINUED | OUTPATIENT
Start: 2020-06-22 | End: 2020-06-25 | Stop reason: HOSPADM

## 2020-06-22 RX ORDER — M-VIT,TX,IRON,MINS/CALC/FOLIC 27MG-0.4MG
1 TABLET ORAL DAILY
COMMUNITY
End: 2022-01-31

## 2020-06-22 RX ORDER — METHYLPREDNISOLONE SODIUM SUCCINATE 125 MG/2ML
62.5 INJECTION, POWDER, LYOPHILIZED, FOR SOLUTION INTRAMUSCULAR; INTRAVENOUS DAILY
Status: DISCONTINUED | OUTPATIENT
Start: 2020-06-23 | End: 2020-06-25 | Stop reason: HOSPADM

## 2020-06-22 RX ADMIN — ACETAMINOPHEN 1000 MG: 500 TABLET ORAL at 15:34

## 2020-06-22 RX ADMIN — ONDANSETRON 4 MG: 4 TABLET, ORALLY DISINTEGRATING ORAL at 19:41

## 2020-06-22 RX ADMIN — SODIUM CHLORIDE: 9 INJECTION, SOLUTION INTRAVENOUS at 19:29

## 2020-06-22 RX ADMIN — DIPHENHYDRAMINE HYDROCHLORIDE 25 MG: 50 INJECTION INTRAMUSCULAR; INTRAVENOUS at 15:35

## 2020-06-22 RX ADMIN — SODIUM CHLORIDE 80 MG: 9 INJECTION, SOLUTION INTRAVENOUS at 19:28

## 2020-06-22 RX ADMIN — LEVOTHYROXINE SODIUM ANHYDROUS 68 MCG: 100 INJECTION, POWDER, LYOPHILIZED, FOR SOLUTION INTRAVENOUS at 18:43

## 2020-06-22 RX ADMIN — ZINC SULFATE 220 MG (50 MG) CAPSULE 220 MG: CAPSULE at 18:43

## 2020-06-22 RX ADMIN — SODIUM CHLORIDE 8 MG/HR: 9 INJECTION, SOLUTION INTRAVENOUS at 19:27

## 2020-06-22 RX ADMIN — ACETAMINOPHEN 650 MG: 325 TABLET, FILM COATED ORAL at 16:53

## 2020-06-22 RX ADMIN — CEFTRIAXONE SODIUM 1 G: 1 INJECTION, POWDER, FOR SOLUTION INTRAMUSCULAR; INTRAVENOUS at 18:44

## 2020-06-22 ASSESSMENT — ENCOUNTER SYMPTOMS
CARDIOVASCULAR NEGATIVE: 1
HEMOPTYSIS: 0
DIARRHEA: 0
CHILLS: 0
DOUBLE VISION: 0
DIZZINESS: 1
DEPRESSION: 0
PSYCHIATRIC NEGATIVE: 1
FEVER: 0
WEAKNESS: 1
BLOOD IN STOOL: 1
HEADACHES: 0
ABDOMINAL PAIN: 1
VOMITING: 0
FOCAL WEAKNESS: 0
MEMORY LOSS: 0
SHORTNESS OF BREATH: 1
NERVOUS/ANXIOUS: 0
PALPITATIONS: 0
DIAPHORESIS: 0
CONSTIPATION: 0
COUGH: 0
EYES NEGATIVE: 1
MUSCULOSKELETAL NEGATIVE: 1
LOSS OF CONSCIOUSNESS: 1
SEIZURES: 0
NAUSEA: 1
BRUISES/BLEEDS EASILY: 0
HEARTBURN: 1
WHEEZING: 0

## 2020-06-22 ASSESSMENT — LIFESTYLE VARIABLES
CONSUMPTION TOTAL: NEGATIVE
HAVE PEOPLE ANNOYED YOU BY CRITICIZING YOUR DRINKING: NO
HOW MANY TIMES IN THE PAST YEAR HAVE YOU HAD 5 OR MORE DRINKS IN A DAY: 0
TOTAL SCORE: 0
HAVE YOU EVER FELT YOU SHOULD CUT DOWN ON YOUR DRINKING: NO
SUBSTANCE_ABUSE: 0
AVERAGE NUMBER OF DAYS PER WEEK YOU HAVE A DRINK CONTAINING ALCOHOL: 0
EVER HAD A DRINK FIRST THING IN THE MORNING TO STEADY YOUR NERVES TO GET RID OF A HANGOVER: NO
TOTAL SCORE: 0
TOTAL SCORE: 0
EVER_SMOKED: NEVER
ALCOHOL_USE: NO
EVER FELT BAD OR GUILTY ABOUT YOUR DRINKING: NO
ON A TYPICAL DAY WHEN YOU DRINK ALCOHOL HOW MANY DRINKS DO YOU HAVE: 0
DOES PATIENT WANT TO STOP DRINKING: NO

## 2020-06-22 ASSESSMENT — COGNITIVE AND FUNCTIONAL STATUS - GENERAL
MOBILITY SCORE: 24
SUGGESTED CMS G CODE MODIFIER DAILY ACTIVITY: CH
DAILY ACTIVITIY SCORE: 24
SUGGESTED CMS G CODE MODIFIER MOBILITY: CH

## 2020-06-22 ASSESSMENT — PATIENT HEALTH QUESTIONNAIRE - PHQ9
SUM OF ALL RESPONSES TO PHQ9 QUESTIONS 1 AND 2: 0
1. LITTLE INTEREST OR PLEASURE IN DOING THINGS: NOT AT ALL
2. FEELING DOWN, DEPRESSED, IRRITABLE, OR HOPELESS: NOT AT ALL

## 2020-06-22 ASSESSMENT — FIBROSIS 4 INDEX: FIB4 SCORE: 1.74

## 2020-06-22 NOTE — ASSESSMENT & PLAN NOTE
History of acute pulmonary embolism which was identified on 6/10/2020 he was started on anticoagulation 6/11/2020.  Patient initially had a DVT with swelling in the right lower extremity which was months before the pulmonary embolism.  He did have acute chest pain with a pulmonary embolism on the right side.  Continue Xarelto per GI

## 2020-06-22 NOTE — ASSESSMENT & PLAN NOTE
-supportive measures with synthroid supplementation at 137 Mcg per day orally at home.  Since we can keep him n.p.o. GI bleed were going to put him on Synthroid in the IV form at 68 mcg  -latest TSH is 8.500 and this is with in establish normal guidlines

## 2020-06-22 NOTE — ED NOTES
Med rec updated and complete.  Allergies reviewed.  Interviewed pt at bedside. Pt denies antibiotic use in last 14 days. Pt recently stated on  Xarelto on 06/11/20.     Pt fills at [facility] in [place]. Locally pt will use [facility].

## 2020-06-22 NOTE — ASSESSMENT & PLAN NOTE
Discussion held with patient regarding his CODE STATUS.  The patient says he does not wish to be resuscitated or intubated.

## 2020-06-22 NOTE — ASSESSMENT & PLAN NOTE
Chronic ulcerative colitis up to now has been under relatively good control.  Ever since he was started on anticoagulation though he started bleeding.  He does have chronic abdominal pain with the ulcerative colitis.  Continue pain management monitor at this point for diarrhea and bleeding  GI following

## 2020-06-22 NOTE — ED TRIAGE NOTES
Pt comes in complaining of bright red blood to his stool for approx 1 week. Hx of ulcerative colitis. Pt also placed on xarelto daily for a recent PE. Pt stating dizziness and a possible syncopal event last night. Pt stating when he fell he did hit his head on the door causing a hole.

## 2020-06-22 NOTE — ASSESSMENT & PLAN NOTE
Currently on 6-mercaptopurine, steroid per GI  Continue Xarelto per GI  Follow CBC closely with hemoglobin 7.9  No urgent need for blood transfusion  Transfuse as needed with target hemoglobin above 7

## 2020-06-22 NOTE — ASSESSMENT & PLAN NOTE
Patient passed out secondary to hypotension which is secondary to the acute blood loss anemia.  Monitor closely

## 2020-06-22 NOTE — ED NOTES
Anny from Lab called with critical result of WBC at 1.9 and Hgb at 6.7. Critical lab result read back to Carlos .   Dr. Venegas notified of critical lab result at 1421.  Critical lab result read back by Dr. Venegas.

## 2020-06-22 NOTE — ED PROVIDER NOTES
"ED Provider Note    CHIEF COMPLAINT  Chief Complaint   Patient presents with   • Bloody Stools   • Syncope   • T-5000 Head Injury       HPI  Arsen Stroud is a 65 y.o. male who presents with 2 complaints.  Patient's been having bloody bowel movements over the last 7 days, he states this is typical for his ulcerative colitis.  He has required blood transfusion in the past.  Patient states he was told to go to the emergency department by his gastroenterologist.  Last night while walking down the [place], he states he passed out falling into the door.  His wife states his head broke the door.  This is of concern for several reasons, including patient was placed on Xarelto 10 days ago for the diagnosis of pulmonary embolism.  He denies new shortness of breath or pleuritic chest pain.  Patient states he has had gradual worsening of shortness of breath in the last 7 days while bleeding.  The last bowel movement this morning was described as bloody.  Patient states he has had difficulty with medications for his ulcerative colitis secondary to developing antibodies to them.  At rest, he denies dizziness however states when he stands up, he feels lightheaded.  She complains of mild diffuse headache after the fall and injury last night    REVIEW OF SYSTEMS    Constitutional: Dizziness, denies fever  Respiratory: Shortness of breath with exertion  Cardiac: Syncope, no chest pain  Gastrointestinal: Abdominal cramping, history of ulcerative colitis, rectal bleeding over the past 7 days  Musculoskeletal: No neck pain, no extremity pain  Neurologic: Headache after head injury       All other systems are negative.       PAST MEDICAL HISTORY  Past Medical History:   Diagnosis Date   • Arrhythmia     reports occasional \"Fluttering\", states work up neg.    • Arthritis     knees   • Hiatus hernia syndrome    • High cholesterol    • Hypothyroid    • Pulmonary emboli (HCC)    • Sleep apnea     had a sleep study. doesn't use cpap   • " Snoring    • Ulcerative colitis (HCC)        FAMILY HISTORY  History reviewed. No pertinent family history.    SOCIAL HISTORY  Social History     Socioeconomic History   • Marital status:      Spouse name: Not on file   • Number of children: Not on file   • Years of education: Not on file   • Highest education level: Not on file   Occupational History   • Not on file   Social Needs   • Financial resource strain: Not on file   • Food insecurity     Worry: Not on file     Inability: Not on file   • Transportation needs     Medical: Not on file     Non-medical: Not on file   Tobacco Use   • Smoking status: Never Smoker   • Smokeless tobacco: Never Used   Substance and Sexual Activity   • Alcohol use: No   • Drug use: No   • Sexual activity: Not on file   Lifestyle   • Physical activity     Days per week: Not on file     Minutes per session: Not on file   • Stress: Not on file   Relationships   • Social connections     Talks on phone: Not on file     Gets together: Not on file     Attends Restorationism service: Not on file     Active member of club or organization: Not on file     Attends meetings of clubs or organizations: Not on file     Relationship status: Not on file   • Intimate partner violence     Fear of current or ex partner: Not on file     Emotionally abused: Not on file     Physically abused: Not on file     Forced sexual activity: Not on file   Other Topics Concern   • Not on file   Social History Narrative   • Not on file       SURGICAL HISTORY  Past Surgical History:   Procedure Laterality Date   • KNEE ARTHROSCOPY  2/12/2015    Performed by Greg Chaves M.D. at SURGERY [facility]   • MEDIAL MENISCECTOMY  2/12/2015    Performed by Greg Chaves M.D. at SURGERY [facility]   • KNEE ARTHROPLASTY TOTAL  2013    Knee Replacement, Total, left   • FLAP GRAFT  6/20/2012    Performed by GREG NAGY at SURGERY [facility]   • FULL THICKNESS SKIN GRAFT  6/20/2012    Performed by  "EDWARD NAGY at SURGERY [facility]   • PIN INSERTION  5/4/2012    Performed by EDWARD NAGY at SURGERY [facility]   • NERVE REPAIR  5/4/2012    Performed by EDWARD NAGY at SURGERY [facility]   • ARTERY EXPLORATION OR REPAIR  5/4/2012    Performed by EDWARD NAGY at SURGERY [facility]   • LIGAMENT REPAIR  5/4/2012    Performed by EDWARD NAGY at SURGERY [facility]       CURRENT MEDICATIONS  Home Medications     Reviewed by Ronaldo Schuler M.D. (Physician) on 06/22/20 at 1507  Med List Status: Complete   Medication Last Dose Status   atorvastatin (LIPITOR) 10 MG TABS 6/22/2020 Active   Calcium Carb-Cholecalciferol 600-800 MG-UNIT Tab 6/22/2020 Active   ferrous sulfate 325 (65 Fe) MG tablet 6/22/2020 Active   lansoprazole (PREVACID) 30 MG CPDR 6/22/2020 Active   levothyroxine (SYNTHROID) 137 MCG Tab 6/21/2020 Active   mercaptopurine (PURINETHOL) 50 MG Tab 6/22/2020 Active   Omega-3 Fatty Acids (FISH OIL) 1000 MG CAPS capsule 6/22/2020 Active   rivaroxaban (XARELTO) 15 MG Tab tablet 6/22/2020 Active   rivaroxaban (XARELTO) 20 MG Tab tablet new script Active   therapeutic multivitamin-minerals (THERAGRAN-M) Tab 6/22/2020 Active                ALLERGIES  Allergies   Allergen Reactions   • Pcn [Penicillins] Rash     .   • Sulfa Drugs Unspecified     Mouth sores, swelling tongue, loss of taste       PHYSICAL EXAM  VITAL SIGNS: BP (!) 93/51   Pulse 84   Temp 36.9 °C (98.4 °F) (Temporal)   Resp 20   Ht 1.88 m (6' 2\")   Wt 72.4 kg (159 lb 9.8 oz)   SpO2 94%   BMI 20.49 kg/m²   Constitutional:  Non-toxic appearance.   HENT: No facial swelling.  Left scalp tenderness is mild.  No facial trauma  Eyes: Anicteric, no conjunctivitis.     Cardiovascular: Normal heart rate, Normal rhythm  Pulmonary:  No wheezing, No rales.   Gastrointestinal: Soft, left lower quadrant tenderness.  No guarding.  Skin: Warm, Dry, No cyanosis.  Pallor  Neurologic: Speech is clear, follows " commands, facial expression is symmetrical.  Sensation normal  Psychiatric: Affect normal,  Mood normal.  Patient is calm and cooperative  Musculoskeletal: Neck, spine, extremities nontender    EKG/Labs  Results for orders placed or performed during the hospital encounter of 06/22/20   CBC WITH DIFFERENTIAL   Result Value Ref Range    WBC 1.9 (LL) 4.8 - 10.8 K/uL    RBC 1.82 (L) 4.70 - 6.10 M/uL    Hemoglobin 6.7 (L) 14.0 - 18.0 g/dL    Hematocrit 20.3 (L) 42.0 - 52.0 %    .8 (H) 81.4 - 97.8 fL    MCH 37.4 (H) 27.0 - 33.0 pg    MCHC 32.5 (L) 33.7 - 35.3 g/dL    RDW 85.4 (H) 35.9 - 50.0 fL    Platelet Count 134 (L) 164 - 446 K/uL    MPV 10.1 9.0 - 12.9 fL    Neutrophils-Polys 53.00 44.00 - 72.00 %    Lymphocytes 17.00 (L) 22.00 - 41.00 %    Monocytes 8.00 0.00 - 13.40 %    Eosinophils 10.00 (H) 0.00 - 6.90 %    Basophils 1.00 0.00 - 1.80 %    Nucleated RBC 1.10 /100 WBC    Neutrophils (Absolute) 1.22 (L) 1.82 - 7.42 K/uL    Lymphs (Absolute) 0.32 (L) 1.00 - 4.80 K/uL    Monos (Absolute) 0.15 0.00 - 0.85 K/uL    Eos (Absolute) 0.19 0.00 - 0.51 K/uL    Baso (Absolute) 0.02 0.00 - 0.12 K/uL    NRBC (Absolute) 0.02 K/uL   COMP METABOLIC PANEL   Result Value Ref Range    Sodium 137 135 - 145 mmol/L    Potassium 3.8 3.6 - 5.5 mmol/L    Chloride 100 96 - 112 mmol/L    Co2 26 20 - 33 mmol/L    Anion Gap 11.0 7.0 - 16.0    Glucose 97 65 - 99 mg/dL    Bun 11 8 - 22 mg/dL    Creatinine 0.82 0.50 - 1.40 mg/dL    Calcium 8.6 8.5 - 10.5 mg/dL    AST(SGOT) 16 12 - 45 U/L    ALT(SGPT) 12 2 - 50 U/L    Alkaline Phosphatase 53 30 - 99 U/L    Total Bilirubin 0.4 0.1 - 1.5 mg/dL    Albumin 3.2 3.2 - 4.9 g/dL    Total Protein 6.4 6.0 - 8.2 g/dL    Globulin 3.2 1.9 - 3.5 g/dL    A-G Ratio 1.0 g/dL   COD (ADULT)   Result Value Ref Range    ABO Grouping Only AB     Rh Grouping Only POS     Antibody Screen-Cod NEG     Component R       R99                 Red Cells, LR       [id number]   transfused   06/22/20   16:21      Product  Type R99     Dispense Status transfused     Unit Number (Barcoded) [id number]     Product Code (Barcoded) Q5134C54     Blood Type (Barcoded) 8400    PROTHROMBIN TIME (INR)   Result Value Ref Range    PT 21.1 (H) 12.0 - 14.6 sec    INR 1.77 (H) 0.87 - 1.13   APTT   Result Value Ref Range    APTT 39.8 (H) 24.7 - 36.0 sec   TROPONIN   Result Value Ref Range    Troponin T 7 6 - 19 ng/L   ESTIMATED GFR   Result Value Ref Range    GFR If African American >60 >60 mL/min/1.73 m 2    GFR If Non African American >60 >60 mL/min/1.73 m 2   DIFFERENTIAL MANUAL   Result Value Ref Range    Bands-Stabs 11.00 (H) 0.00 - 10.00 %    Manual Diff Status PERFORMED    PERIPHERAL SMEAR REVIEW   Result Value Ref Range    Peripheral Smear Review see below    URINALYSIS CULTURE, IF INDICATED    Specimen: Urine   Result Value Ref Range    Color Yellow     Character Turbid (A)     Ph 5.5 5.0 - 8.0    Glucose Negative Negative mg/dL    Ketones Negative Negative mg/dL    Protein Negative Negative mg/dL    Bilirubin Small (A) Negative    Urobilinogen, Urine 0.2 Negative    Nitrite Negative Negative    Leukocyte Esterase Negative Negative    Occult Blood Negative Negative    Micro Urine Req Microscopic    HGB (Hemoglobin) for 48 hours   Result Value Ref Range    Hemoglobin 6.8 (L) 14.0 - 18.0 g/dL   Basic TEG   Result Value Ref Range    Reaction Time Initial-R 12.7 (H) 5.0 - 10.0 min    Clot Kinetics-K 2.2 1.0 - 3.0 min    Clot Angle-Angle 61.3 53.0 - 72.0 degrees    Maximum Clot Strength-MA 67.9 50.0 - 70.0 mm    Lysis 30 minutes-LY30 0.4 0.0 - 8.0 %    TEG Algorithm Link Algorithm    REFRACTOMETER SG   Result Value Ref Range    Specific Gravity 1.019    URINE MICROSCOPIC (W/UA)   Result Value Ref Range    WBC 0-2 (A) /hpf    RBC 0-2 (A) /hpf    Bacteria Negative None /hpf    Epithelial Cells Negative /hpf    Amorphous Crystal Present /hpf    Uric Acid Crystal Positive /hpf    Sperm Rare /hpf   EKG   Result Value Ref Range    Report        "[facility] Emergency Dept.    Test Date:  2020  Pt Name:    ANAMARIA CORDOVA                Department: ER  MRN:        [id number]                      Room:       T734  Gender:     Male                         Technician: [id number]  :        1955                   Requested By:ER TRIAGE PROTOCOL  Order #:    [id number]                    Reading MD: MARKO RICHARD MD    Measurements  Intervals                                Axis  Rate:       85                           P:          76  UT:         144                          QRS:        91  QRSD:       80                           T:          51  QT:         368  QTc:        438    Interpretive Statements  SINUS RHYTHM  RIGHT AXIS DEVIATION  Compared to ECG 06/10/2020 22:30:09  Right-axis deviation now present  No evidence of acute ischemia or arrhythmia  Electronically Signed On 2020 20:27:55 PDT by MARKO RICHARD MD           RADIOLOGY/PROCEDURES  CT-HEAD W/O   Final Result      No acute intracranial findings.         DX-CHEST-PORTABLE (1 VIEW)   Final Result      No acute cardiopulmonary abnormality.            COURSE & MEDICAL DECISION MAKING  Pertinent Labs & Imaging studies reviewed. (See chart for details)  With head injury last night, patient on blood thinners, CT scan was obtained which was negative.  Hemoglobin returned at 6.7 prompting ordering of packed red blood cell infusion.  Patient had brief hypotension, this was rechecked carefully by the nursing staff found to be 96 systolic.  Patient was asymptomatic through this possible hypotensive episode.  Patient states \"I only feel it when I stand up\".  I have contacted gastroenterology Dr Cueva for consultation.  Patient is a difficult position, recent diagnosis of pulmonary embolism prompting anticoagulation however in the midst of ulcerative colitis flare with rectal bleeding and severe anemia.  Patient is hospitalized for ongoing evaluation and treatment.    FINAL " IMPRESSION     1. Gastrointestinal hemorrhage, unspecified gastrointestinal hemorrhage type     2. Ulcerative colitis with rectal bleeding, unspecified location (HCC)     3. Anemia, unspecified type     4. Leukopenia, unspecified type     5. Syncope, unspecified syncope type     6. Closed head injury, initial encounter     7. Ulcerative pancolitis with complication (HCC)  mercaptopurine (PURINETHOL) tablet 100 mg    methylPREDNISolone sod succ (SOLU-MEDROL) 125 MG injection 62.5 mg          Critical care time 35 minutes         Electronically signed by: Lino Ch M.D., 6/22/2020 3:12 PM

## 2020-06-22 NOTE — H&P
Hospital Medicine History & Physical Note    Date of Service  6/22/2020    Primary Care Physician  Pcp Pt States None    Code Status  DNAR/DNI    Chief Complaint  Chief Complaint   Patient presents with   • Bloody Stools   • Syncope   • T-5000 Head Injury       History of Presenting Illness  65 y.o. male who presented 6/22/2020 with rectal bleeding that is been ongoing for the past 11 days.  The patient has a history of ulcerative colitis.  This has been under good control until about 610 when he started to have some chest pain.  The patient was then diagnosed with pulmonary embolism and started on Xarelto.  In combination with blood thinners and ulcerative colitis he is now developed a severe blood loss anemia.  This has led to hypotension dizziness and he actually passed out today and is what brought him to the hospital.  The patient at this point will need blood resuscitation.  The patient at this point will need fluid resuscitation we will hold his Xarelto.  The patient will need to be reevaluated very strongly for the need of anticoagulation since otherwise he is going to be constantly needing blood transfusions.    Review of Systems  Review of Systems   Constitutional: Positive for malaise/fatigue. Negative for chills, diaphoresis and fever.   HENT: Negative.    Eyes: Negative.  Negative for double vision.   Respiratory: Positive for shortness of breath. Negative for cough, hemoptysis and wheezing.    Cardiovascular: Negative.  Negative for chest pain, palpitations and leg swelling.   Gastrointestinal: Positive for abdominal pain, blood in stool, heartburn and nausea. Negative for constipation, diarrhea and vomiting.   Genitourinary: Negative.  Negative for frequency, hematuria and urgency.   Musculoskeletal: Negative.  Negative for joint pain.   Skin: Negative.  Negative for itching and rash.   Neurological: Positive for dizziness, loss of consciousness and weakness. Negative for focal weakness, seizures and  headaches.   Endo/Heme/Allergies: Negative.  Does not bruise/bleed easily.   Psychiatric/Behavioral: Negative.  Negative for depression, memory loss, substance abuse and suicidal ideas. The patient is not nervous/anxious.    All other systems reviewed and are negative.      Past Medical History   has a past medical history of Arrhythmia, Arthritis, Hiatus hernia syndrome, High cholesterol, Hypothyroid, Pulmonary emboli (HCC), Sleep apnea, Snoring, and Ulcerative colitis (HCC).    Surgical History   has a past surgical history that includes pin insertion (5/4/2012); nerve repair (5/4/2012); artery exploration or repair (5/4/2012); ligament repair (5/4/2012); flap graft (6/20/2012); full thickness skin graft (6/20/2012); knee arthroplasty total (2013); knee arthroscopy (2/12/2015); and medial meniscectomy (2/12/2015).     Family History  There is history of DVT in the mother as well as other blood clots.  Father had diabetes hypertension coronary artery disease    Social History   reports that he has never smoked. He has never used smokeless tobacco. He reports that he does not drink alcohol or use drugs.    Allergies  Allergies   Allergen Reactions   • Pcn [Penicillins] Rash     .   • Sulfa Drugs Unspecified     Mouth sores, swelling tongue, loss of taste       Medications  Prior to Admission Medications   Prescriptions Last Dose Informant Patient Reported? Taking?   Calcium Carb-Cholecalciferol 600-800 MG-UNIT Tab 6/22/2020 at 0800 Patient Yes No   Sig: Take 1 Tab by mouth every day.   Omega-3 Fatty Acids (FISH OIL) 1000 MG CAPS capsule 6/22/2020 at 0800 Patient Yes No   Sig: Take 1,000 mg by mouth every morning.   atorvastatin (LIPITOR) 10 MG TABS 6/22/2020 at 0800 Patient Yes No   Sig: Take 10 mg by mouth every morning.   ferrous sulfate 325 (65 Fe) MG tablet 6/22/2020 at 0800 Patient Yes No   Sig: Take 325 mg by mouth 2 Times a Day.   lansoprazole (PREVACID) 30 MG CPDR 6/22/2020 at 0800 Patient Yes No   Sig: Take  30 mg by mouth every morning. Indications: Gastroesophageal Reflux Disease   levothyroxine (SYNTHROID) 137 MCG Tab 6/21/2020 at 2330 Patient Yes No   Sig: Take 137 mcg by mouth every bedtime.   mercaptopurine (PURINETHOL) 50 MG Tab 6/22/2020 at 0800 Patient Yes Yes   Sig: Take 100 mg by mouth every day.   rivaroxaban (XARELTO) 15 MG Tab tablet 6/22/2020 at 0800 Patient No No   Sig: Take 1 Tab by mouth 2 times a day, with meals for 19 days.   rivaroxaban (XARELTO) 20 MG Tab tablet new script at n/a Patient No No   Sig: Take 1 Tab by mouth with dinner.   therapeutic multivitamin-minerals (THERAGRAN-M) Tab 6/22/2020 at 0800 Patient Yes Yes   Sig: Take 1 Tab by mouth every day.      Facility-Administered Medications: None       Physical Exam  Temp:  [36.9 °C (98.4 °F)] 36.9 °C (98.4 °F)  Pulse:  [83-92] 83  Resp:  [14-22] 22  BP: ()/(51-61) 98/54  SpO2:  [90 %-100 %] 93 %    Physical Exam  Vitals signs and nursing note reviewed. Exam conducted with a chaperone present.   Constitutional:       General: He is not in acute distress.     Appearance: Normal appearance. He is well-developed and normal weight. He is not ill-appearing.   HENT:      Head: Normocephalic and atraumatic.      Right Ear: External ear normal.      Left Ear: External ear normal.      Nose: Nose normal.      Mouth/Throat:      Mouth: Mucous membranes are dry.      Pharynx: Oropharynx is clear.   Eyes:      Extraocular Movements: Extraocular movements intact.      Conjunctiva/sclera: Conjunctivae normal.      Pupils: Pupils are equal, round, and reactive to light.   Neck:      Musculoskeletal: Normal range of motion and neck supple.      Thyroid: No thyromegaly.      Vascular: No JVD.   Cardiovascular:      Rate and Rhythm: Normal rate and regular rhythm.      Pulses: Normal pulses.      Heart sounds: Normal heart sounds.   Pulmonary:      Effort: Pulmonary effort is normal.      Breath sounds: Normal breath sounds.   Chest:      Chest wall: No  tenderness.   Abdominal:      General: Abdomen is flat. Bowel sounds are normal. There is no distension.      Palpations: Abdomen is soft. There is no mass.      Tenderness: There is abdominal tenderness in the right lower quadrant, suprapubic area and left lower quadrant. There is no guarding or rebound.   Musculoskeletal: Normal range of motion.   Lymphadenopathy:      Cervical: No cervical adenopathy.   Skin:     General: Skin is warm and dry.      Capillary Refill: Capillary refill takes 2 to 3 seconds.      Coloration: Skin is pale.      Findings: No rash.   Neurological:      General: No focal deficit present.      Mental Status: He is alert and oriented to person, place, and time. Mental status is at baseline.      GCS: GCS eye subscore is 4. GCS verbal subscore is 5. GCS motor subscore is 6.      Cranial Nerves: No cranial nerve deficit.      Deep Tendon Reflexes: Reflexes are normal and symmetric.   Psychiatric:         Mood and Affect: Mood normal.         Behavior: Behavior normal.         Thought Content: Thought content normal.         Judgment: Judgment normal.         Laboratory:  Recent Labs     06/22/20  1200   WBC 1.9*   RBC 1.82*   HEMOGLOBIN 6.7*   HEMATOCRIT 20.3*   .8*   MCH 37.4*   MCHC 32.5*   RDW 85.4*   PLATELETCT 134*   MPV 10.1     Recent Labs     06/22/20  1200   SODIUM 137   POTASSIUM 3.8   CHLORIDE 100   CO2 26   GLUCOSE 97   BUN 11   CREATININE 0.82   CALCIUM 8.6     Recent Labs     06/22/20  1200   ALTSGPT 12   ASTSGOT 16   ALKPHOSPHAT 53   TBILIRUBIN 0.4   GLUCOSE 97     Recent Labs     06/22/20  1200   APTT 39.8*   INR 1.77*     No results for input(s): NTPROBNP in the last 72 hours.      Recent Labs     06/22/20  1200   TROPONINT 7       Imaging:  CT-HEAD W/O   Final Result      No acute intracranial findings.         DX-CHEST-PORTABLE (1 VIEW)   Final Result      No acute cardiopulmonary abnormality.            Assessment/Plan:    * GIB (gastrointestinal  bleeding)  Assessment & Plan  We will need to evaluate the patient for the need of anticoagulation and how to control his ulcerative colitis while he is on anticoagulation.Gastrointestinal bleeding is secondary to a combination of ulcerative colitis with anticoagulation.    Syncope  Assessment & Plan  Patient passed out secondary to hypotension which is secondary to the acute blood loss anemia.  Patient will need to be transfused.  At this point I discussed with him transfusion and he is okay with that I explained to him the possibility of transfusion reaction versus infection from blood products and the patient at this point is assuming the risk and willing to take blood transfusions.    Chronic anticoagulation  Assessment & Plan  Patient has been started on chronic anticoagulation with Xarelto on 6/11/2020.  This is secondary to pulmonary embolism.  With his ulcerative colitis since he was started on anticoagulation he continued bleeding he says he took it for the first 2 days he was fine but after that his bleeding just intensified.  At this point it will be difficult to anticoagulate patient but also to keep transfusing him.    Ulcerative colitis (HCC)- (present on admission)  Assessment & Plan  Chronic ulcerative colitis up to now has been under relatively good control.  Ever since he was started on anticoagulation though he started bleeding.  He does have chronic abdominal pain with the ulcerative colitis.  Continue pain management monitor at this point for diarrhea and bleeding    Pancytopenia (HCC)  Assessment & Plan  Pancytopenia with a hemoglobin of 6.7 hematocrit 20.3 with platelets of 134 and a WBC count of 1.9.  This might be secondary to his ulcerative colitis with bone marrow suppression.  The patient eventually may need a bone marrow aspiration.    Acute pulmonary embolism without acute cor pulmonale (HCC)- (present on admission)  Assessment & Plan  History of acute pulmonary embolism which was  identified on 6/10/2020 he was started on anticoagulation 6/11/2020.  Patient initially had a DVT with swelling in the right lower extremity which was months before the pulmonary embolism.  He did have acute chest pain with a pulmonary embolism on the right side.    DNR no code (do not resuscitate)  Assessment & Plan  Discussion held with patient regarding his CODE STATUS.  The patient says he does not wish to be resuscitated or intubated.    Hypothyroidism- (present on admission)  Assessment & Plan  -supportive measures with synthroid supplementation at 137 Mcg per day orally at home.  Since we can keep him n.p.o. GI bleed were going to put him on Synthroid in the IV form at 68 mcg  -latest TSH is 8.500 and this is with in establish normal guidlines

## 2020-06-23 LAB
HGB BLD-MCNC: 7.2 G/DL (ref 14–18)
HGB BLD-MCNC: 8.1 G/DL (ref 14–18)
HGB BLD-MCNC: 8.3 G/DL (ref 14–18)

## 2020-06-23 PROCEDURE — 86923 COMPATIBILITY TEST ELECTRIC: CPT

## 2020-06-23 PROCEDURE — P9016 RBC LEUKOCYTES REDUCED: HCPCS

## 2020-06-23 PROCEDURE — 770020 HCHG ROOM/CARE - TELE (206)

## 2020-06-23 PROCEDURE — 700111 HCHG RX REV CODE 636 W/ 250 OVERRIDE (IP): Performed by: INTERNAL MEDICINE

## 2020-06-23 PROCEDURE — A9270 NON-COVERED ITEM OR SERVICE: HCPCS | Performed by: HOSPITALIST

## 2020-06-23 PROCEDURE — 700102 HCHG RX REV CODE 250 W/ 637 OVERRIDE(OP): Performed by: HOSPITALIST

## 2020-06-23 PROCEDURE — 700101 HCHG RX REV CODE 250: Performed by: HOSPITALIST

## 2020-06-23 PROCEDURE — A9270 NON-COVERED ITEM OR SERVICE: HCPCS | Performed by: INTERNAL MEDICINE

## 2020-06-23 PROCEDURE — 700111 HCHG RX REV CODE 636 W/ 250 OVERRIDE (IP): Performed by: HOSPITALIST

## 2020-06-23 PROCEDURE — 700102 HCHG RX REV CODE 250 W/ 637 OVERRIDE(OP): Performed by: INTERNAL MEDICINE

## 2020-06-23 PROCEDURE — 85018 HEMOGLOBIN: CPT | Mod: 91

## 2020-06-23 PROCEDURE — 36415 COLL VENOUS BLD VENIPUNCTURE: CPT

## 2020-06-23 PROCEDURE — C9113 INJ PANTOPRAZOLE SODIUM, VIA: HCPCS | Performed by: HOSPITALIST

## 2020-06-23 PROCEDURE — 99233 SBSQ HOSP IP/OBS HIGH 50: CPT | Performed by: INTERNAL MEDICINE

## 2020-06-23 PROCEDURE — 700105 HCHG RX REV CODE 258: Performed by: HOSPITALIST

## 2020-06-23 PROCEDURE — 36430 TRANSFUSION BLD/BLD COMPNT: CPT

## 2020-06-23 PROCEDURE — 700105 HCHG RX REV CODE 258

## 2020-06-23 RX ORDER — SODIUM CHLORIDE 9 MG/ML
INJECTION, SOLUTION INTRAVENOUS
Status: COMPLETED
Start: 2020-06-23 | End: 2020-06-23

## 2020-06-23 RX ORDER — DIPHENHYDRAMINE HYDROCHLORIDE 50 MG/ML
25 INJECTION INTRAMUSCULAR; INTRAVENOUS ONCE
Status: COMPLETED | OUTPATIENT
Start: 2020-06-23 | End: 2020-06-23

## 2020-06-23 RX ADMIN — MERCAPTOPURINE 100 MG: 50 TABLET ORAL at 05:44

## 2020-06-23 RX ADMIN — RIVAROXABAN 20 MG: 20 TABLET, FILM COATED ORAL at 17:50

## 2020-06-23 RX ADMIN — SODIUM CHLORIDE 8 MG/HR: 9 INJECTION, SOLUTION INTRAVENOUS at 05:43

## 2020-06-23 RX ADMIN — SODIUM CHLORIDE: 9 INJECTION, SOLUTION INTRAVENOUS at 21:47

## 2020-06-23 RX ADMIN — SODIUM CHLORIDE: 9 INJECTION, SOLUTION INTRAVENOUS at 14:01

## 2020-06-23 RX ADMIN — CEFTRIAXONE SODIUM 1 G: 1 INJECTION, POWDER, FOR SOLUTION INTRAMUSCULAR; INTRAVENOUS at 05:43

## 2020-06-23 RX ADMIN — ACETAMINOPHEN 650 MG: 325 TABLET, FILM COATED ORAL at 00:31

## 2020-06-23 RX ADMIN — LEVOTHYROXINE SODIUM ANHYDROUS 68 MCG: 100 INJECTION, POWDER, LYOPHILIZED, FOR SOLUTION INTRAVENOUS at 05:44

## 2020-06-23 RX ADMIN — SODIUM CHLORIDE 1000 ML: 9 INJECTION, SOLUTION INTRAVENOUS at 00:44

## 2020-06-23 RX ADMIN — SODIUM CHLORIDE: 9 INJECTION, SOLUTION INTRAVENOUS at 04:31

## 2020-06-23 RX ADMIN — METHYLPREDNISOLONE SODIUM SUCCINATE 62.5 MG: 125 INJECTION, POWDER, FOR SOLUTION INTRAMUSCULAR; INTRAVENOUS at 05:43

## 2020-06-23 RX ADMIN — DIPHENHYDRAMINE HYDROCHLORIDE 25 MG: 50 INJECTION INTRAMUSCULAR; INTRAVENOUS at 00:32

## 2020-06-23 ASSESSMENT — ENCOUNTER SYMPTOMS
NERVOUS/ANXIOUS: 0
EYE DISCHARGE: 0
HEARTBURN: 0
CHILLS: 0
FOCAL WEAKNESS: 0
DEPRESSION: 0
SPUTUM PRODUCTION: 0
BLURRED VISION: 0
DIARRHEA: 0
EYE REDNESS: 0
INSOMNIA: 0
ORTHOPNEA: 0
ABDOMINAL PAIN: 0
SEIZURES: 0
EYE PAIN: 0
HEADACHES: 0
WEIGHT LOSS: 0
SHORTNESS OF BREATH: 0
MYALGIAS: 0
NECK PAIN: 0
STRIDOR: 0
FEVER: 0
COUGH: 0
CONSTIPATION: 0
BACK PAIN: 0
BLOOD IN STOOL: 1
PALPITATIONS: 0
VOMITING: 0
DIZZINESS: 0
NAUSEA: 0

## 2020-06-23 ASSESSMENT — FIBROSIS 4 INDEX: FIB4 SCORE: 2.17

## 2020-06-23 NOTE — CARE PLAN
Problem: Safety  Goal: Will remain free from falls  Outcome: PROGRESSING AS EXPECTED  Note: Pt mobility assessed at beginning of shift. Pt is standby assist. Fall precautions in place. Non-slip socks on. Bed in lowest locked position. Bed alarm on. Call light within reach. Pt educated to call for assistance and verbalizes understanding.      Problem: Bowel/Gastric:  Goal: Normal bowel function is maintained or improved  Outcome: PROGRESSING AS EXPECTED  Note: Patient has less frequent bloody stools, reacting well to PRBCs

## 2020-06-23 NOTE — PROGRESS NOTES
Hospital Medicine Daily Progress Note    Date of Service  6/23/2020    Chief Complaint  65 y.o. male admitted 6/22/2020 with GI bleed    Hospital Course    65 years old male with past medical history of complicated ulcerative colitis, pulmonary embolism on Xarelto admitted for GI bleed      Interval Problem Update  He stated that his symptom is improving.  I updated him about the medication that he is currently taking per GI recommendation.  Including Xarelto despite the fact that the patient continued to have GI bleed.  I saw and examined the patient today.    Consultants/Specialty  GI    Code Status  DNR/DNI    Disposition  Remain on the floor    Review of Systems  Review of Systems   Constitutional: Negative for chills, fever and weight loss.   HENT: Negative for congestion and nosebleeds.    Eyes: Negative for blurred vision, pain, discharge and redness.   Respiratory: Negative for cough, sputum production, shortness of breath and stridor.    Cardiovascular: Negative for chest pain, palpitations and orthopnea.   Gastrointestinal: Positive for blood in stool. Negative for abdominal pain, diarrhea, heartburn, nausea and vomiting.   Genitourinary: Negative for dysuria, frequency and urgency.   Musculoskeletal: Negative for back pain, myalgias and neck pain.   Skin: Negative for itching and rash.   Neurological: Negative for dizziness, focal weakness, seizures and headaches.   Psychiatric/Behavioral: Negative for depression. The patient is not nervous/anxious and does not have insomnia.         Physical Exam  Temp:  [36.4 °C (97.6 °F)-37.4 °C (99.4 °F)] 36.4 °C (97.6 °F)  Pulse:  [74-92] 74  Resp:  [14-22] 18  BP: ()/(51-61) 95/57  SpO2:  [90 %-100 %] 95 %    Physical Exam  Vitals signs reviewed.   Constitutional:       General: He is not in acute distress.     Appearance: Normal appearance.   HENT:      Head: Normocephalic and atraumatic.      Nose: No congestion or rhinorrhea.   Eyes:      Extraocular  Movements: Extraocular movements intact.      Pupils: Pupils are equal, round, and reactive to light.   Neck:      Musculoskeletal: Normal range of motion and neck supple.   Cardiovascular:      Rate and Rhythm: Normal rate and regular rhythm.      Pulses: Normal pulses.   Pulmonary:      Effort: Pulmonary effort is normal. No respiratory distress.      Breath sounds: Normal breath sounds.   Abdominal:      General: Bowel sounds are normal. There is no distension.      Palpations: Abdomen is soft.      Tenderness: There is no abdominal tenderness.   Musculoskeletal:         General: No swelling or tenderness.   Skin:     General: Skin is warm.      Findings: No erythema.   Neurological:      General: No focal deficit present.      Mental Status: He is alert and oriented to person, place, and time.         Fluids    Intake/Output Summary (Last 24 hours) at 6/23/2020 0851  Last data filed at 6/23/2020 0230  Gross per 24 hour   Intake 756.25 ml   Output --   Net 756.25 ml       Laboratory  Recent Labs     06/22/20  1200 06/22/20  1838 06/22/20  2314 06/23/20  0756   WBC 1.9*  --   --   --    RBC 1.82*  --   --   --    HEMOGLOBIN 6.7* 6.8* 6.4* 8.3*   HEMATOCRIT 20.3*  --   --   --    .8*  --   --   --    MCH 37.4*  --   --   --    MCHC 32.5*  --   --   --    RDW 85.4*  --   --   --    PLATELETCT 134*  --   --   --    MPV 10.1  --   --   --      Recent Labs     06/22/20  1200   SODIUM 137   POTASSIUM 3.8   CHLORIDE 100   CO2 26   GLUCOSE 97   BUN 11   CREATININE 0.82   CALCIUM 8.6     Recent Labs     06/22/20  1200   APTT 39.8*   INR 1.77*               Imaging  CT-HEAD W/O   Final Result      No acute intracranial findings.         DX-CHEST-PORTABLE (1 VIEW)   Final Result      No acute cardiopulmonary abnormality.           Assessment/Plan  * GIB (gastrointestinal bleeding)  Assessment & Plan  Currently on 6-mercaptopurine, steroid per GI  Continue Xarelto per GI  Follow CBC closely  Transfuse as needed with  target hemoglobin above 7    Syncope  Assessment & Plan  Patient passed out secondary to hypotension which is secondary to the acute blood loss anemia.  Monitor closely    Chronic anticoagulation  Assessment & Plan  P plan as above    Ulcerative colitis (HCC)- (present on admission)  Assessment & Plan  Chronic ulcerative colitis up to now has been under relatively good control.  Ever since he was started on anticoagulation though he started bleeding.  He does have chronic abdominal pain with the ulcerative colitis.  Continue pain management monitor at this point for diarrhea and bleeding  GI following    Pancytopenia (HCC)  Assessment & Plan  Follow CBC closely  Transfuse as needed    Acute pulmonary embolism without acute cor pulmonale (HCC)- (present on admission)  Assessment & Plan  History of acute pulmonary embolism which was identified on 6/10/2020 he was started on anticoagulation 6/11/2020.  Patient initially had a DVT with swelling in the right lower extremity which was months before the pulmonary embolism.  He did have acute chest pain with a pulmonary embolism on the right side.  Continue Xarelto per GI    DNR no code (do not resuscitate)  Assessment & Plan  Discussion held with patient regarding his CODE STATUS.  The patient says he does not wish to be resuscitated or intubated.    Hypothyroidism- (present on admission)  Assessment & Plan  -supportive measures with synthroid supplementation at 137 Mcg per day orally at home.  Since we can keep him n.p.o. GI bleed were going to put him on Synthroid in the IV form at 68 mcg  -latest TSH is 8.500 and this is with in establish normal guidlines        VTE prophylaxis: xarelto

## 2020-06-23 NOTE — PROGRESS NOTES
Patient takes Stelara 1x month which costs $20,000 plus. The next dose is due tomorrow. Need to discuss next steps with GI on whether wife (who lives in [facility]) should bring the shot.    Also, MD was unclear on whether we should resume anticoags while experiencing GI Bleed.

## 2020-06-23 NOTE — PROGRESS NOTES
Gastroenterology Progress Note     Author: Josué Cueva M.D.   Date & Time Created: 2020 11:06 AM    Chief Complaint:  hematochezia    Interval History:  One bloody BM today. No pain    Review of Systems:  Review of Systems   Constitutional: Negative for chills and fever.   Respiratory: Negative for shortness of breath.    Cardiovascular: Negative for chest pain.   Gastrointestinal: Positive for blood in stool. Negative for abdominal pain, constipation, diarrhea, heartburn, nausea and vomiting.       Physical Exam:  Physical Exam  Constitutional:       General: He is not in acute distress.     Appearance: Normal appearance. He is not ill-appearing.   Abdominal:      General: Abdomen is flat. Bowel sounds are normal. There is no distension.      Palpations: Abdomen is soft.      Tenderness: There is no abdominal tenderness.   Neurological:      Mental Status: He is alert.         Labs:          Recent Labs     20  1200   SODIUM 137   POTASSIUM 3.8   CHLORIDE 100   CO2 26   BUN 11   CREATININE 0.82   CALCIUM 8.6     Recent Labs     20  1200   ALTSGPT 12   ASTSGOT 16   ALKPHOSPHAT 53   TBILIRUBIN 0.4   GLUCOSE 97     Recent Labs     20  1200 20  1838 20  2314 20  0756   RBC 1.82*  --   --   --    HEMOGLOBIN 6.7* 6.8* 6.4* 8.3*   HEMATOCRIT 20.3*  --   --   --    PLATELETCT 134*  --   --   --    PROTHROMBTM 21.1*  --   --   --    APTT 39.8*  --   --   --    INR 1.77*  --   --   --      Recent Labs     20  1200   WBC 1.9*   NEUTSPOLYS 53.00   LYMPHOCYTES 17.00*   MONOCYTES 8.00   EOSINOPHILS 10.00*   BASOPHILS 1.00   ASTSGOT 16   ALTSGPT 12   ALKPHOSPHAT 53   TBILIRUBIN 0.4     Hemodynamics:  Temp (24hrs), Av.1 °C (98.7 °F), Min:36.4 °C (97.6 °F), Max:37.4 °C (99.4 °F)  Temperature: 36.4 °C (97.6 °F)  Pulse  Av.3  Min: 74  Max: 92   Blood Pressure: (!) 95/57(rn notified)     Respiratory:    Respiration: 18, Pulse Oximetry: 95 %        RUL Breath Sounds:  Clear, RML Breath Sounds: Clear, RLL Breath Sounds: Diminished, BRANDEE Breath Sounds: Clear, LLL Breath Sounds: Diminished  Fluids:    Intake/Output Summary (Last 24 hours) at 6/23/2020 1106  Last data filed at 6/23/2020 0800  Gross per 24 hour   Intake 1116.25 ml   Output --   Net 1116.25 ml     Weight: 72.4 kg (159 lb 9.8 oz)  GI/Nutrition:  Orders Placed This Encounter   Procedures   • Diet Order Regular     Standing Status:   Standing     Number of Occurrences:   1     Order Specific Question:   Diet:     Answer:   Regular [1]     Medical Decision Making, by Problem:  Active Hospital Problems    Diagnosis   • *GIB (gastrointestinal bleeding) [K92.2]   • Chronic anticoagulation [Z79.01]   • Syncope [R55]   • Ulcerative colitis (HCC) [K51.90]   • Pancytopenia (HCC) [D61.818]   • Acute pulmonary embolism without acute cor pulmonale (HCC) [I26.99]   • Hypothyroidism [E03.9]   • DNR no code (do not resuscitate) [Z66]       Plan:  GI bleed in the setting of anticoagulant use - uncontrolled UC    -- recommend continuing Xarelto    -- continue IV steroids for now    -- monitor H/H    -- Continue 6MP    -- Awaiting outpatient labs on Stelera; patient will hopefully continue this as outpatient. He would be a poor candidate for Xeljanz due to risk of PE    -- continue to monitor    Quality-Core Measures

## 2020-06-23 NOTE — PROGRESS NOTES
Received bedside report from RN, pt care assumed, VSS, pt assessment complete. Pt AAOx4, no c/o pain at this time. No signs of acute distress noted at this time. POC discussed with pt and verbalizes no questions. Pt denies any additional needs at this time. Bed in lowest position, bed alarm on, pt educated on fall risk and verbalized understanding, call light within reach, hourly rounding initiated.

## 2020-06-23 NOTE — CARE PLAN
Problem: Communication  Goal: The ability to communicate needs accurately and effectively will improve  Outcome: PROGRESSING AS EXPECTED   Patient educated to utilize call light. Patient oriented to hospital room. Patient encouraged to ask questions about plan of care. Patient effectively uses call light and is involved in POC.    Problem: Safety  Goal: Will remain free from injury  Outcome: PROGRESSING AS EXPECTED   Patient's risk for injury and falls assessed. Appropriate safety precautions in place. Patient educated to utilize call light for needs. Patient verbalizes understanding.    Problem: Knowledge Deficit  Goal: Knowledge of disease process/condition, treatment plan, diagnostic tests, and medications will improve  Outcome: PROGRESSING AS EXPECTED   Patient is educated of disease process and condition. Treatment team has included patient in plan of care. All medications indications and side effects are explained. Patient is encouraged to ask questions. Patient indicates understanding.

## 2020-06-23 NOTE — CONSULTS
DATE OF SERVICE:  06/22/2020    REQUESTING PHYSICIAN:  Ronaldo Schulre MD.    REASON FOR CONSULTATION:  Ulcerative colitis.    HISTORY OF PRESENT ILLNESS:  The patient is a 65-year-old male admitted to the   emergency room today after experiencing a syncopal episode early this morning   during which time he hit his head on a door.  The patient was evaluated in   the emergency room and underwent CT scan of the head showing no abnormalities.    He was found to be significantly anemic, worse than his baseline.    The patient is known to me from my outpatient practice.    In review, the patient was diagnosed with ulcerative proctitis in 2016.  He   was initially treated with mesalamine enemas followed by oral mesalamine.  He   was then treated with azathioprine with initial response to therapy, but then   again had a recurrence.  He was followed by GI consultants from [id number].  He   was then referred to the [facility] IBD clinic who recommended therapy with   biologic agent.    I evaluated him with a colonoscopy in 11/2018, which showed a left-sided   colitis.  He was intolerant of Rowasa enemas.  He was started on Humira, but   had no therapeutic response.  Antibody testing showed anti-Humira antibodies.    The Humira was subsequently stopped.    He was seen at the [facility] in [place], at which time another   colonoscopy was performed at this time revealing pancolitis.    In 08/2019, I started the patient on Entyvio infusions.  He did not show any   clinical response to therapy after several months, at which time, again was   discontinued.  I then started the patient on oral budesonide, which improved   his symptoms.  In 01/2019, I started the patient on 6-mercaptopurine.  In   03/2020, he was started on Stelara.  His symptoms have been variable since   that time, but the general trend has been towards an improvement.    Earlier this month, the patient developed chest pain, fever, and shortness of   breath.  He was  referred to the emergency room where he was found to have   evidence of a pulmonary embolism.  He was started on anticoagulation with   Xarelto.  Since that time, he has had increasing bloody stools.    REVIEW OF SYSTEMS:  PSYCHIATRIC:  Denies anxiety or depression.  MUSCULOSKELETAL:  No joint pain, swelling, stiffness.  DERMATOLOGIC:  No rash or pruritus.  GASTROINTESTINAL:  As above.  CARDIOVASCULAR:  Positive for dyspnea on exertion.  PULMONARY:  No cough or wheezing.  ALLERGY AND IMMUNOLOGY:  No hay fever.  ENDOCRINE:  Denies polyuria or polydipsia.  CONSTITUTIONAL:  Positive for fatigue.  GENITOURINARY:  Denies dysuria.    PAST MEDICAL HISTORY:  Ulcerative colitis, hyperlipidemia, hypertension,   hypothyroidism, pulmonary embolism.    PAST SURGICAL HISTORY:  Total knee arthroplasty, colonoscopy, endoscopy,   orthopedic surgeries.    OUTPATIENT MEDICATIONS:  Stelara, Xarelto, 6-mercaptopurine, budesonide,   Prevacid, fish oil, vitamin D, multivitamin, atorvastatin, thyroxine.    PHYSICAL EXAMINATION:  VITAL SIGNS:  Temperature is 37.4, pulse 79, respirations 18, BP 97/58, O2 sat   93% on room air.  GENERAL APPEARANCE:  A thin male in no acute distress.  He is alert and   oriented x3.  HEENT:  Sclerae are anicteric.  Conjunctivae are pale.  Oropharynx moist.  NECK:  Supple.  No adenopathy.  HEART:  Regular rate and rhythm.  LUNGS:  Clear to auscultation.  ABDOMEN:  Flat, soft, nontender with positive bowel sounds.  There is no   guarding, rebound or rigidity.  EXTREMITIES:  Warm and dry without clubbing, cyanosis or edema.    LABORATORY DATA:  White count 1.9, hematocrit 20, , platelet count   134,000.  Sodium 137, potassium 3.8, chloride 100, bicarbonate 26, BUN 11,   creatinine 0.8, AST 16, ALT 12, alkaline phosphatase 53, bilirubin 0.4,   albumin 3.2.  INR is 1.7.    IMAGING:  CT scan of the head shows no acute abnormalities.    ASSESSMENT AND PLAN:  1.  A 65-year-old male with chronic ulcerative  colitis with ongoing symptoms   despite a combination immunomodulator therapy and biologic agent.  The patient   has failed numerous other therapies including mesalamine and Humira.  Plan:  A.  We will continue him on a short term 6-mercaptopurine 100 mg orally daily,   and Stelara infusions 8 weeks as scheduled.  B.  We are awaiting a Stelara trough and antibody level, which was drawn last   week.  C.  Given his ongoing flare, we will start the patient on IV Solu-Medrol   during his hospitalization in attempt to control his flare.  D.  He will be re-consulting at the [facility] to discuss other treatment   options.  E.  If he does not show clinical response to Stelara, one last biologic agent   which could be tried is Xeljanz.  F.  Lastly, we had discussed if he is refractory to all therapy, possible need   for a total colectomy.  2.  Pulmonary embolism secondary to hypercoagulable state, related to   inflammatory bowel disease.  Plan:  Continue Xarelto.  3.  Anemia, secondary to ongoing gastrointestinal blood loss and chronic   disease.  Plan:  The patient is to receive 2 units packed red blood cell   transfusion.  4.  Hypertension.  5.  Hyperlipidemia.  6.  Leukopenia, secondary to immunosuppressive therapy.  7.  Hypothyroidism, on supplementation therapy.       ____________________________________     NIECY COHN DO    PIS / NTS    DD:  06/22/2020 18:16:44  DT:  06/22/2020 22:45:33    D#:  [id number]  Job#:  [id number]

## 2020-06-23 NOTE — PROGRESS NOTES
· 2 RN skin check complete with SUZY [place].   · Devices in place tele box, socks.  · Skin assessed under devices yes.  · Confirmed pressure ulcers found on NA.  · New potential pressure ulcers noted on NA. Wound consult placed? NA. Photo uploaded? NA.   · The following interventions are in place moisturizer, patient turns self side to side, and pillows in place for positioning and comfort.      Patient's skin is clean, dry, and intact. Small scab to right pointer finger.

## 2020-06-24 LAB
ALBUMIN SERPL BCP-MCNC: 2.7 G/DL (ref 3.2–4.9)
ALBUMIN/GLOB SERPL: 0.9 G/DL
ALP SERPL-CCNC: 46 U/L (ref 30–99)
ALT SERPL-CCNC: 10 U/L (ref 2–50)
ANION GAP SERPL CALC-SCNC: 8 MMOL/L (ref 7–16)
ANISOCYTOSIS BLD QL SMEAR: ABNORMAL
AST SERPL-CCNC: 12 U/L (ref 12–45)
BASOPHILS # BLD AUTO: 0.9 % (ref 0–1.8)
BASOPHILS # BLD: 0.03 K/UL (ref 0–0.12)
BILIRUB SERPL-MCNC: 0.3 MG/DL (ref 0.1–1.5)
BUN SERPL-MCNC: 6 MG/DL (ref 8–22)
CALCIUM SERPL-MCNC: 8.2 MG/DL (ref 8.5–10.5)
CHLORIDE SERPL-SCNC: 107 MMOL/L (ref 96–112)
CO2 SERPL-SCNC: 26 MMOL/L (ref 20–33)
CREAT SERPL-MCNC: 0.78 MG/DL (ref 0.5–1.4)
EOSINOPHIL # BLD AUTO: 0.18 K/UL (ref 0–0.51)
EOSINOPHIL NFR BLD: 6.1 % (ref 0–6.9)
ERYTHROCYTE [DISTWIDTH] IN BLOOD BY AUTOMATED COUNT: 94.4 FL (ref 35.9–50)
GLOBULIN SER CALC-MCNC: 3.1 G/DL (ref 1.9–3.5)
GLUCOSE SERPL-MCNC: 113 MG/DL (ref 65–99)
HCT VFR BLD AUTO: 25.3 % (ref 42–52)
HGB BLD-MCNC: 7.9 G/DL (ref 14–18)
LYMPHOCYTES # BLD AUTO: 0.15 K/UL (ref 1–4.8)
LYMPHOCYTES NFR BLD: 5.2 % (ref 22–41)
MACROCYTES BLD QL SMEAR: ABNORMAL
MANUAL DIFF BLD: NORMAL
MCH RBC QN AUTO: 33.6 PG (ref 27–33)
MCHC RBC AUTO-ENTMCNC: 31.2 G/DL (ref 33.7–35.3)
MCV RBC AUTO: 107.7 FL (ref 81.4–97.8)
MICROCYTES BLD QL SMEAR: ABNORMAL
MONOCYTES # BLD AUTO: 0.18 K/UL (ref 0–0.85)
MONOCYTES NFR BLD AUTO: 6.1 % (ref 0–13.4)
MORPHOLOGY BLD-IMP: NORMAL
NEUTROPHILS # BLD AUTO: 2.37 K/UL (ref 1.82–7.42)
NEUTROPHILS NFR BLD: 80.9 % (ref 44–72)
NEUTS BAND NFR BLD MANUAL: 0.9 % (ref 0–10)
NRBC # BLD AUTO: 0 K/UL
NRBC BLD-RTO: 0 /100 WBC
OVALOCYTES BLD QL SMEAR: NORMAL
PLATELET # BLD AUTO: 98 K/UL (ref 164–446)
PLATELET BLD QL SMEAR: NORMAL
PMV BLD AUTO: 9.7 FL (ref 9–12.9)
POIKILOCYTOSIS BLD QL SMEAR: NORMAL
POTASSIUM SERPL-SCNC: 4.5 MMOL/L (ref 3.6–5.5)
PROT SERPL-MCNC: 5.8 G/DL (ref 6–8.2)
RBC # BLD AUTO: 2.35 M/UL (ref 4.7–6.1)
RBC BLD AUTO: PRESENT
SODIUM SERPL-SCNC: 141 MMOL/L (ref 135–145)
WBC # BLD AUTO: 2.9 K/UL (ref 4.8–10.8)

## 2020-06-24 PROCEDURE — 700102 HCHG RX REV CODE 250 W/ 637 OVERRIDE(OP): Performed by: INTERNAL MEDICINE

## 2020-06-24 PROCEDURE — 700105 HCHG RX REV CODE 258: Performed by: HOSPITALIST

## 2020-06-24 PROCEDURE — 700111 HCHG RX REV CODE 636 W/ 250 OVERRIDE (IP): Performed by: INTERNAL MEDICINE

## 2020-06-24 PROCEDURE — 770020 HCHG ROOM/CARE - TELE (206)

## 2020-06-24 PROCEDURE — A9270 NON-COVERED ITEM OR SERVICE: HCPCS | Performed by: INTERNAL MEDICINE

## 2020-06-24 PROCEDURE — 80053 COMPREHEN METABOLIC PANEL: CPT

## 2020-06-24 PROCEDURE — 99233 SBSQ HOSP IP/OBS HIGH 50: CPT | Performed by: INTERNAL MEDICINE

## 2020-06-24 PROCEDURE — 85007 BL SMEAR W/DIFF WBC COUNT: CPT

## 2020-06-24 PROCEDURE — 700111 HCHG RX REV CODE 636 W/ 250 OVERRIDE (IP): Performed by: HOSPITALIST

## 2020-06-24 PROCEDURE — 36415 COLL VENOUS BLD VENIPUNCTURE: CPT

## 2020-06-24 PROCEDURE — 85027 COMPLETE CBC AUTOMATED: CPT

## 2020-06-24 RX ADMIN — METHYLPREDNISOLONE SODIUM SUCCINATE 62.5 MG: 125 INJECTION, POWDER, FOR SOLUTION INTRAMUSCULAR; INTRAVENOUS at 05:16

## 2020-06-24 RX ADMIN — MERCAPTOPURINE 100 MG: 50 TABLET ORAL at 05:15

## 2020-06-24 RX ADMIN — RIVAROXABAN 20 MG: 20 TABLET, FILM COATED ORAL at 17:00

## 2020-06-24 RX ADMIN — SODIUM CHLORIDE: 9 INJECTION, SOLUTION INTRAVENOUS at 06:34

## 2020-06-24 RX ADMIN — LEVOTHYROXINE SODIUM 137 MCG: 25 TABLET ORAL at 05:16

## 2020-06-24 RX ADMIN — CEFTRIAXONE SODIUM 1 G: 1 INJECTION, POWDER, FOR SOLUTION INTRAMUSCULAR; INTRAVENOUS at 05:18

## 2020-06-24 ASSESSMENT — ENCOUNTER SYMPTOMS
PALPITATIONS: 0
EYE PAIN: 0
ORTHOPNEA: 0
BLOOD IN STOOL: 1
EYE REDNESS: 0
COUGH: 0
MYALGIAS: 0
FEVER: 0
WEIGHT LOSS: 0
SPUTUM PRODUCTION: 0
DIZZINESS: 0
CONSTIPATION: 0
HEADACHES: 0
ABDOMINAL PAIN: 1
NECK PAIN: 0
CHILLS: 0
DEPRESSION: 0
DIARRHEA: 0
ABDOMINAL PAIN: 0
SHORTNESS OF BREATH: 0
BACK PAIN: 0
INSOMNIA: 0
VOMITING: 0
NERVOUS/ANXIOUS: 0
STRIDOR: 0
SEIZURES: 0
FOCAL WEAKNESS: 0
HEARTBURN: 0
EYE DISCHARGE: 0
NAUSEA: 0

## 2020-06-24 ASSESSMENT — FIBROSIS 4 INDEX: FIB4 SCORE: 2.44

## 2020-06-24 NOTE — CARE PLAN
Problem: Safety  Goal: Will remain free from injury  Outcome: PROGRESSING AS EXPECTED  Goal: Will remain free from falls  Outcome: PROGRESSING AS EXPECTED  Intervention: Implement fall precautions  Flowsheets (Taken 6/24/2020 0800)  Environmental Precautions:   Treaded Slipper Socks on Patient   Personal Belongings, Wastebasket, Call Bell etc. in Easy Reach   Transferred to Stronger Side   Report Given to Other Health Care Providers Regarding Fall Risk   Bed in Low Position   Communication Sign for Patients & Families   Mobility Assessed & Appropriate Sign Placed  Note: Fall precautions in place: treaded slipper socks on, mobility signs posted, hourly rounding, bed in lowest position, belongings and call light are within reach, bed alarm on, and near nurses station.      Problem: Infection  Goal: Will remain free from infection  Outcome: PROGRESSING AS EXPECTED  Intervention: Implement standard precautions and perform hand washing before and after patient contact  Note: Proper hand hygiene before and after patient care to ensure patient will remain free from infection.   Pt. Moved to private room.

## 2020-06-24 NOTE — CARE PLAN
Problem: Safety  Goal: Will remain free from falls  Note: Pt mobility assessed at beginning of shift. Pt is standby assist. Fall precautions in place. Non-slip socks on. Bed in lowest locked position. Clutter free environment. Call light within reach. Pt educated to call for assistance and verbalizes understanding.      Problem: Knowledge Deficit  Goal: Knowledge of the prescribed therapeutic regimen will improve  Note: Pt educated about disease process. Reason why medications are taken. And informed about treatment plan.

## 2020-06-24 NOTE — PROGRESS NOTES
Hospital Medicine Daily Progress Note    Date of Service  6/24/2020    Chief Complaint  65 y.o. male admitted 6/22/2020 with GI bleed    Hospital Course    65 years old male with past medical history of complicated ulcerative colitis, pulmonary embolism on Xarelto admitted for GI bleed      Interval Problem Update  He did well overnight.  He stated that his stool color was normal yesterday but after he started taking Xarelto he noticed some blood in the stool again.  I updated him about his hemoglobin level overnight and this morning was 7.9.  GI is following.  I saw and examined the patient today.    Consultants/Specialty  GI    Code Status  DNR/DNI    Disposition  Remain on the floor    Review of Systems  Review of Systems   Constitutional: Negative for chills and weight loss.   HENT: Negative for congestion and nosebleeds.    Eyes: Negative for pain, discharge and redness.   Respiratory: Negative for cough, sputum production and stridor.    Cardiovascular: Negative for palpitations and orthopnea.   Gastrointestinal: Positive for abdominal pain and blood in stool. Negative for diarrhea, nausea and vomiting.   Genitourinary: Negative for dysuria, frequency and urgency.   Musculoskeletal: Negative for back pain, myalgias and neck pain.   Skin: Negative for itching and rash.   Neurological: Negative for dizziness, focal weakness, seizures and headaches.   Psychiatric/Behavioral: Negative for depression. The patient is not nervous/anxious and does not have insomnia.         Physical Exam  Temp:  [36.3 °C (97.4 °F)-37.1 °C (98.7 °F)] 36.3 °C (97.4 °F)  Pulse:  [66-77] 68  Resp:  [16-18] 16  BP: ()/(52-57) 94/52  SpO2:  [93 %-97 %] 93 %    Physical Exam  Vitals signs reviewed.   Constitutional:       General: He is not in acute distress.     Appearance: Normal appearance.   HENT:      Head: Normocephalic and atraumatic.      Nose: No congestion or rhinorrhea.   Eyes:      Extraocular Movements: Extraocular  movements intact.      Pupils: Pupils are equal, round, and reactive to light.   Neck:      Musculoskeletal: Normal range of motion and neck supple.   Cardiovascular:      Rate and Rhythm: Normal rate and regular rhythm.      Pulses: Normal pulses.      Heart sounds: Normal heart sounds.   Pulmonary:      Effort: Pulmonary effort is normal. No respiratory distress.      Breath sounds: Normal breath sounds.   Abdominal:      General: Bowel sounds are normal. There is no distension.      Palpations: Abdomen is soft.      Tenderness: There is abdominal tenderness.   Musculoskeletal:         General: No swelling or tenderness.   Skin:     General: Skin is warm.   Neurological:      General: No focal deficit present.      Mental Status: He is alert and oriented to person, place, and time. Mental status is at baseline.         Fluids    Intake/Output Summary (Last 24 hours) at 6/24/2020 0800  Last data filed at 6/23/2020 1400  Gross per 24 hour   Intake 360 ml   Output --   Net 360 ml       Laboratory  Recent Labs     06/22/20  1200  06/23/20  1518 06/23/20  2256 06/24/20  0701   WBC 1.9*  --   --   --  2.9*   RBC 1.82*  --   --   --  2.35*   HEMOGLOBIN 6.7*   < > 8.1* 7.2* 7.9*   HEMATOCRIT 20.3*  --   --   --  25.3*   .8*  --   --   --  107.7*   MCH 37.4*  --   --   --  33.6*   MCHC 32.5*  --   --   --  31.2*   RDW 85.4*  --   --   --  94.4*   PLATELETCT 134*  --   --   --  98*   MPV 10.1  --   --   --  9.7    < > = values in this interval not displayed.     Recent Labs     06/22/20  1200 06/24/20  0701   SODIUM 137 141   POTASSIUM 3.8 4.5   CHLORIDE 100 107   CO2 26 26   GLUCOSE 97 113*   BUN 11 6*   CREATININE 0.82 0.78   CALCIUM 8.6 8.2*     Recent Labs     06/22/20  1200   APTT 39.8*   INR 1.77*               Imaging  CT-HEAD W/O   Final Result      No acute intracranial findings.         DX-CHEST-PORTABLE (1 VIEW)   Final Result      No acute cardiopulmonary abnormality.           Assessment/Plan  * GIB  (gastrointestinal bleeding)  Assessment & Plan  Currently on 6-mercaptopurine, steroid per GI  Continue Xarelto per GI  Follow CBC closely with hemoglobin 7.9  No urgent need for blood transfusion  Transfuse as needed with target hemoglobin above 7    Syncope  Assessment & Plan  Patient passed out secondary to hypotension which is secondary to the acute blood loss anemia.  Monitor closely    Chronic anticoagulation  Assessment & Plan  P plan as above    Ulcerative colitis (HCC)- (present on admission)  Assessment & Plan  Chronic ulcerative colitis up to now has been under relatively good control.  Ever since he was started on anticoagulation though he started bleeding.  He does have chronic abdominal pain with the ulcerative colitis.  Continue pain management monitor at this point for diarrhea and bleeding  GI following    Pancytopenia (HCC)  Assessment & Plan  Follow CBC closely  Transfuse as needed    Acute pulmonary embolism without acute cor pulmonale (HCC)- (present on admission)  Assessment & Plan  History of acute pulmonary embolism which was identified on 6/10/2020 he was started on anticoagulation 6/11/2020.  Patient initially had a DVT with swelling in the right lower extremity which was months before the pulmonary embolism.  He did have acute chest pain with a pulmonary embolism on the right side.  Continue Xarelto per GI    DNR no code (do not resuscitate)  Assessment & Plan  Discussion held with patient regarding his CODE STATUS.  The patient says he does not wish to be resuscitated or intubated.    Hypothyroidism- (present on admission)  Assessment & Plan  -supportive measures with synthroid supplementation at 137 Mcg per day orally at home.  Since we can keep him n.p.o. GI bleed were going to put him on Synthroid in the IV form at 68 mcg  -latest TSH is 8.500 and this is with in establish normal guidlines        VTE prophylaxis: xarelto

## 2020-06-24 NOTE — PROGRESS NOTES
Assumed care of PT A&O 4. Pt resting in bed with no signs of labored breathing. On RA. Tele monitor in place, cardiac rhythm being monitored. Call light within reach, bed in lowest position, upper bed rails up. Pt was updated on plan of care for the night. Will continue to monitor.

## 2020-06-24 NOTE — PROGRESS NOTES
Assumed care this am from night shift RN. Patient in bed . Call bell and personal items within reach, bed locked in low position. Bed exit alarm off. Hourly rounding in place.

## 2020-06-25 ENCOUNTER — PATIENT OUTREACH (OUTPATIENT)
Dept: HEALTH INFORMATION MANAGEMENT | Facility: OTHER | Age: 65
End: 2020-06-25

## 2020-06-25 VITALS
BODY MASS INDEX: 20.57 KG/M2 | TEMPERATURE: 98.2 F | RESPIRATION RATE: 18 BRPM | SYSTOLIC BLOOD PRESSURE: 100 MMHG | HEIGHT: 74 IN | OXYGEN SATURATION: 95 % | DIASTOLIC BLOOD PRESSURE: 65 MMHG | WEIGHT: 160.27 LBS | HEART RATE: 78 BPM

## 2020-06-25 LAB
ANISOCYTOSIS BLD QL SMEAR: ABNORMAL
BASOPHILS # BLD AUTO: 1.7 % (ref 0–1.8)
BASOPHILS # BLD: 0.05 K/UL (ref 0–0.12)
EOSINOPHIL # BLD AUTO: 0.11 K/UL (ref 0–0.51)
EOSINOPHIL NFR BLD: 3.5 % (ref 0–6.9)
ERYTHROCYTE [DISTWIDTH] IN BLOOD BY AUTOMATED COUNT: 89.5 FL (ref 35.9–50)
HCT VFR BLD AUTO: 25.1 % (ref 42–52)
HGB BLD-MCNC: 7.9 G/DL (ref 14–18)
LYMPHOCYTES # BLD AUTO: 0.52 K/UL (ref 1–4.8)
LYMPHOCYTES NFR BLD: 17.4 % (ref 22–41)
MACROCYTES BLD QL SMEAR: ABNORMAL
MANUAL DIFF BLD: NORMAL
MCH RBC QN AUTO: 33.5 PG (ref 27–33)
MCHC RBC AUTO-ENTMCNC: 31.5 G/DL (ref 33.7–35.3)
MCV RBC AUTO: 106.4 FL (ref 81.4–97.8)
MICROCYTES BLD QL SMEAR: ABNORMAL
MONOCYTES # BLD AUTO: 0.31 K/UL (ref 0–0.85)
MONOCYTES NFR BLD AUTO: 10.4 % (ref 0–13.4)
MORPHOLOGY BLD-IMP: NORMAL
NEUTROPHILS # BLD AUTO: 2.01 K/UL (ref 1.82–7.42)
NEUTROPHILS NFR BLD: 67 % (ref 44–72)
NRBC # BLD AUTO: 0 K/UL
NRBC BLD-RTO: 0 /100 WBC
PLATELET # BLD AUTO: 123 K/UL (ref 164–446)
PLATELET BLD QL SMEAR: NORMAL
PMV BLD AUTO: 10 FL (ref 9–12.9)
POIKILOCYTOSIS BLD QL SMEAR: NORMAL
POLYCHROMASIA BLD QL SMEAR: NORMAL
RBC # BLD AUTO: 2.36 M/UL (ref 4.7–6.1)
RBC BLD AUTO: PRESENT
WBC # BLD AUTO: 3 K/UL (ref 4.8–10.8)

## 2020-06-25 PROCEDURE — A9270 NON-COVERED ITEM OR SERVICE: HCPCS | Performed by: INTERNAL MEDICINE

## 2020-06-25 PROCEDURE — 36415 COLL VENOUS BLD VENIPUNCTURE: CPT

## 2020-06-25 PROCEDURE — 85007 BL SMEAR W/DIFF WBC COUNT: CPT

## 2020-06-25 PROCEDURE — 700105 HCHG RX REV CODE 258

## 2020-06-25 PROCEDURE — 85027 COMPLETE CBC AUTOMATED: CPT

## 2020-06-25 PROCEDURE — 700102 HCHG RX REV CODE 250 W/ 637 OVERRIDE(OP): Performed by: INTERNAL MEDICINE

## 2020-06-25 PROCEDURE — 99239 HOSP IP/OBS DSCHRG MGMT >30: CPT | Performed by: INTERNAL MEDICINE

## 2020-06-25 PROCEDURE — 700111 HCHG RX REV CODE 636 W/ 250 OVERRIDE (IP): Performed by: HOSPITALIST

## 2020-06-25 PROCEDURE — 700111 HCHG RX REV CODE 636 W/ 250 OVERRIDE (IP): Performed by: INTERNAL MEDICINE

## 2020-06-25 PROCEDURE — 700105 HCHG RX REV CODE 258: Performed by: HOSPITALIST

## 2020-06-25 RX ORDER — SODIUM CHLORIDE 9 MG/ML
INJECTION, SOLUTION INTRAVENOUS
Status: COMPLETED
Start: 2020-06-25 | End: 2020-06-25

## 2020-06-25 RX ORDER — PREDNISONE 20 MG/1
20 TABLET ORAL DAILY
Qty: 50 TAB | Refills: 0 | Status: SHIPPED | OUTPATIENT
Start: 2020-06-25 | End: 2022-01-31

## 2020-06-25 RX ADMIN — LEVOTHYROXINE SODIUM 137 MCG: 25 TABLET ORAL at 05:39

## 2020-06-25 RX ADMIN — SODIUM CHLORIDE 250 ML: 9 INJECTION, SOLUTION INTRAVENOUS at 05:51

## 2020-06-25 RX ADMIN — METHYLPREDNISOLONE SODIUM SUCCINATE 62.5 MG: 125 INJECTION, POWDER, FOR SOLUTION INTRAMUSCULAR; INTRAVENOUS at 05:40

## 2020-06-25 RX ADMIN — MERCAPTOPURINE 100 MG: 50 TABLET ORAL at 05:40

## 2020-06-25 RX ADMIN — CEFTRIAXONE SODIUM 1 G: 1 INJECTION, POWDER, FOR SOLUTION INTRAMUSCULAR; INTRAVENOUS at 05:41

## 2020-06-25 ASSESSMENT — ENCOUNTER SYMPTOMS
BACK PAIN: 0
PALPITATIONS: 0
EYE PAIN: 0
MYALGIAS: 0
WEIGHT LOSS: 0
ORTHOPNEA: 0
SPUTUM PRODUCTION: 0
NAUSEA: 0
NERVOUS/ANXIOUS: 0
DIZZINESS: 0
STRIDOR: 0
HEADACHES: 0
COUGH: 0
FOCAL WEAKNESS: 0
DIARRHEA: 0
INSOMNIA: 0
SEIZURES: 0
CHILLS: 0
NECK PAIN: 0
EYE REDNESS: 0
VOMITING: 0
DEPRESSION: 0
BLOOD IN STOOL: 1
EYE DISCHARGE: 0
ABDOMINAL PAIN: 1

## 2020-06-25 NOTE — DISCHARGE INSTRUCTIONS
Discharge Instructions    Discharged to home by car with relative. Discharged via wheelchair, hospital escort: Yes.  Special equipment needed: Not Applicable    Be sure to schedule a follow-up appointment with your primary care doctor or any specialists as instructed.     Discharge Plan:   Diet Plan: Discussed  Activity Level: Discussed  Confirmed Follow up Appointment: Patient to Call and Schedule Appointment-keep [facility] appointment and stay on all medications prescribed plus Stalara at same dose and schedule.  Confirmed Symptoms Management: Discussed  Medication Reconciliation Updated: Yes    I understand that a diet low in cholesterol, fat, and sodium is recommended for good health. Unless I have been given specific instructions below for another diet, I accept this instruction as my diet prescription.   Other diet: regular diet    Special Instructions: None    · Is patient discharged on Warfarin / Coumadin?   No     Depression / Suicide Risk    As you are discharged from this [facility] facility, it is important to learn how to keep safe from harming yourself.    Recognize the warning signs:  · Abrupt changes in personality, positive or negative- including increase in energy   · Giving away possessions  · Change in eating patterns- significant weight changes-  positive or negative  · Change in sleeping patterns- unable to sleep or sleeping all the time   · Unwillingness or inability to communicate  · Depression  · Unusual sadness, discouragement and loneliness  · Talk of wanting to die  · Neglect of personal appearance   · Rebelliousness- reckless behavior  · Withdrawal from people/activities they love  · Confusion- inability to concentrate     If you or a loved one observes any of these behaviors or has concerns about self-harm, here's what you can do:  · Talk about it- your feelings and reasons for harming yourself  · Remove any means that you might use to hurt yourself (examples: pills, rope,  extension cords, firearm)  · Get professional help from the community (Mental Health, Substance Abuse, psychological counseling)  · Do not be alone:Call your Safe Contact- someone whom you trust who will be there for you.  · Call your local CRISIS HOTLINE 899-0104 or 648-951-8560  · Call your local Children's Mobile Crisis Response Team [place] (620) 907-5670 or www.[employer]  · Call the toll free National Suicide Prevention Hotlines   · National Suicide Prevention Lifeline 251-479-WUBP (8221)  · National Hope Line Network 800-SUICIDE (674-0038)

## 2020-06-25 NOTE — CARE PLAN
Problem: Safety  Goal: Will remain free from falls  Note: Pt mobility assessed at beginning of shift. Pt is up self. Fall precautions in place. Non-slip socks on. Bed in lowest locked position. Clutter free environment. Call light within reach. Pt educated to call for assistance and verbalizes understanding.      Problem: Knowledge Deficit  Goal: Knowledge of disease process/condition, treatment plan, diagnostic tests, and medications will improve  Note: Pt educated about disease process. Reason why medications are taken. And informed about treatment plan.

## 2020-06-25 NOTE — PROGRESS NOTES
Hospital Medicine Daily Progress Note    Date of Service  6/25/2020    Chief Complaint  65 y.o. male admitted 6/22/2020 with GI bleed    Hospital Course    65 years old male with past medical history of complicated ulcerative colitis, pulmonary embolism on Xarelto admitted for GI bleed      Interval Problem Update  He did well overnight.  He stated that his stool color was normal yesterday but after he started taking Xarelto he noticed some blood in the stool again.  I updated him about his hemoglobin level overnight and this morning was 7.9.  GI is following.  I saw and examined the patient today.    Consultants/Specialty  GI    Code Status  DNR/DNI    Disposition  Remain on the floor    Review of Systems  Review of Systems   Constitutional: Negative for chills and weight loss.   HENT: Negative for congestion and nosebleeds.    Eyes: Negative for pain, discharge and redness.   Respiratory: Negative for cough, sputum production and stridor.    Cardiovascular: Negative for palpitations and orthopnea.   Gastrointestinal: Positive for abdominal pain and blood in stool. Negative for diarrhea, nausea and vomiting.   Genitourinary: Negative for dysuria, frequency and urgency.   Musculoskeletal: Negative for back pain, myalgias and neck pain.   Skin: Negative for itching and rash.   Neurological: Negative for dizziness, focal weakness, seizures and headaches.   Psychiatric/Behavioral: Negative for depression. The patient is not nervous/anxious and does not have insomnia.         Physical Exam  Temp:  [36.5 °C (97.7 °F)-37.2 °C (99 °F)] 36.8 °C (98.3 °F)  Pulse:  [60-84] 68  Resp:  [12-18] 18  BP: (108-115)/(57-68) 113/63  SpO2:  [95 %-99 %] 97 %    Physical Exam  Vitals signs reviewed.   Constitutional:       General: He is not in acute distress.     Appearance: Normal appearance.   HENT:      Head: Normocephalic and atraumatic.      Nose: No congestion or rhinorrhea.   Eyes:      Extraocular Movements: Extraocular  movements intact.      Pupils: Pupils are equal, round, and reactive to light.   Neck:      Musculoskeletal: Normal range of motion and neck supple.   Cardiovascular:      Rate and Rhythm: Normal rate and regular rhythm.      Pulses: Normal pulses.      Heart sounds: Normal heart sounds.   Pulmonary:      Effort: Pulmonary effort is normal. No respiratory distress.      Breath sounds: Normal breath sounds.   Abdominal:      General: Bowel sounds are normal. There is no distension.      Palpations: Abdomen is soft.      Tenderness: There is abdominal tenderness.   Musculoskeletal:         General: No swelling or tenderness.   Skin:     General: Skin is warm.   Neurological:      General: No focal deficit present.      Mental Status: He is alert and oriented to person, place, and time. Mental status is at baseline.         Fluids    Intake/Output Summary (Last 24 hours) at 6/25/2020 0747  Last data filed at 6/24/2020 1800  Gross per 24 hour   Intake 460 ml   Output --   Net 460 ml       Laboratory  Recent Labs     06/22/20  1200  06/23/20  2256 06/24/20  0701 06/25/20  0339   WBC 1.9*  --   --  2.9* 3.0*   RBC 1.82*  --   --  2.35* 2.36*   HEMOGLOBIN 6.7*   < > 7.2* 7.9* 7.9*   HEMATOCRIT 20.3*  --   --  25.3* 25.1*   .8*  --   --  107.7* 106.4*   MCH 37.4*  --   --  33.6* 33.5*   MCHC 32.5*  --   --  31.2* 31.5*   RDW 85.4*  --   --  94.4* 89.5*   PLATELETCT 134*  --   --  98* 123*   MPV 10.1  --   --  9.7 10.0    < > = values in this interval not displayed.     Recent Labs     06/22/20  1200 06/24/20  0701   SODIUM 137 141   POTASSIUM 3.8 4.5   CHLORIDE 100 107   CO2 26 26   GLUCOSE 97 113*   BUN 11 6*   CREATININE 0.82 0.78   CALCIUM 8.6 8.2*     Recent Labs     06/22/20  1200   APTT 39.8*   INR 1.77*               Imaging  CT-HEAD W/O   Final Result      No acute intracranial findings.         DX-CHEST-PORTABLE (1 VIEW)   Final Result      No acute cardiopulmonary abnormality.           Assessment/Plan  *  GIB (gastrointestinal bleeding)  Assessment & Plan  Currently on 6-mercaptopurine, steroid per GI  Continue Xarelto per GI  Follow CBC closely with hemoglobin 7.9  No urgent need for blood transfusion  Transfuse as needed with target hemoglobin above 7    Syncope  Assessment & Plan  Patient passed out secondary to hypotension which is secondary to the acute blood loss anemia.  Monitor closely    Chronic anticoagulation  Assessment & Plan  P plan as above    Ulcerative colitis (HCC)- (present on admission)  Assessment & Plan  Chronic ulcerative colitis up to now has been under relatively good control.  Ever since he was started on anticoagulation though he started bleeding.  He does have chronic abdominal pain with the ulcerative colitis.  Continue pain management monitor at this point for diarrhea and bleeding  GI following    Pancytopenia (HCC)  Assessment & Plan  Follow CBC closely  Transfuse as needed    Acute pulmonary embolism without acute cor pulmonale (HCC)- (present on admission)  Assessment & Plan  History of acute pulmonary embolism which was identified on 6/10/2020 he was started on anticoagulation 6/11/2020.  Patient initially had a DVT with swelling in the right lower extremity which was months before the pulmonary embolism.  He did have acute chest pain with a pulmonary embolism on the right side.  Continue Xarelto per GI    DNR no code (do not resuscitate)  Assessment & Plan  Discussion held with patient regarding his CODE STATUS.  The patient says he does not wish to be resuscitated or intubated.    Hypothyroidism- (present on admission)  Assessment & Plan  -supportive measures with synthroid supplementation at 137 Mcg per day orally at home.  Since we can keep him n.p.o. GI bleed were going to put him on Synthroid in the IV form at 68 mcg  -latest TSH is 8.500 and this is with in establish normal guidlines        VTE prophylaxis: xarelto

## 2020-06-25 NOTE — PROGRESS NOTES
Pt discharged to home with spouse-pt and spouse given written discharge instructions-all questions answered-pt escorted out in wheelchair by staff.

## 2020-06-25 NOTE — DISCHARGE SUMMARY
Discharge Summary    CHIEF COMPLAINT ON ADMISSION  Chief Complaint   Patient presents with   • Bloody Stools   • Syncope   • T-5000 Head Injury       Reason for Admission  Sent By KRZYSZTOF SMITH      Admission Date  6/22/2020    CODE STATUS  DNAR/DNI    HPI & HOSPITAL COURSE   65 years old male with past medical history of complicated ulcerative colitis, pulmonary embolism on Xarelto admitted for GI bleed.  Please refer to H&P for detail.  Because of his complicated history of ulcerative colitis GI was consulted.  His hemoglobin remained stable between 7 and 8.  GI recommended to continue Xarelto despite GI bleed.  They also recommended to start the patient on IV methylprednisolone.  His hemoglobin has been stable and according to GI he can be discharged home today and follow-up with them as an outpatient.  Please see below for steroid/prednisone prescription with instruction in detail.  I saw and examined the patient upon discharge.  Please call [phone] to schedule PCP appointment for patient.    Required specialty appointments include:     Dr. Cueva in 1 week    Therefore, he is discharged in fair and stable condition to home with close outpatient follow-up.    The patient met 2-midnight criteria for an inpatient stay at the time of discharge.    Discharge Date  06/25/20      FOLLOW UP ITEMS POST DISCHARGE      DISCHARGE DIAGNOSES  Principal Problem:    GIB (gastrointestinal bleeding) POA: Unknown  Active Problems:    Ulcerative colitis (HCC) POA: Yes    Chronic anticoagulation POA: Unknown    Syncope POA: Unknown    Acute pulmonary embolism without acute cor pulmonale (HCC) POA: Yes    Pancytopenia (HCC) POA: Unknown    DNR no code (do not resuscitate) POA: Unknown    Hypothyroidism POA: Yes  Resolved Problems:    * No resolved hospital problems. *      FOLLOW UP  No future appointments.  PCP     In 1 week      Josué Cueva M.D.  [address] [place] [address] [address]oo [place] [id number]  [phone]    In 1 week        MEDICATIONS  ON DISCHARGE     Medication List      START taking these medications      Instructions   predniSONE 20 MG Tabs  Commonly known as:  DELTASONE   Doctor's comments:  Take prednisone 40 mg daily for 1 week followed by 30 mg daily for 1 week followed by 20 mg daily for 1 week followed by 10 mg daily for 1 week until he is evaluated by GI  Take 1 Tab by mouth every day.  Dose:  20 mg        CHANGE how you take these medications      Instructions   rivaroxaban 20 MG Tabs tablet  Start taking on:  July 2, 2020  What changed:  Another medication with the same name was removed. Continue taking this medication, and follow the directions you see here.  Commonly known as:  XARELTO   Take 1 Tab by mouth with dinner.  Dose:  20 mg        CONTINUE taking these medications      Instructions   atorvastatin 10 MG Tabs  Commonly known as:  LIPITOR   Take 10 mg by mouth every morning.  Dose:  10 mg     Calcium Carb-Cholecalciferol 600-800 MG-UNIT Tabs   Take 1 Tab by mouth every day.  Dose:  1 Tab     ferrous sulfate 325 (65 Fe) MG tablet   Take 325 mg by mouth 2 Times a Day.  Dose:  325 mg     fish oil 1000 MG Caps capsule   Take 1,000 mg by mouth every morning.  Dose:  1,000 mg     lansoprazole 30 MG Cpdr  Commonly known as:  PREVACID   Take 30 mg by mouth every morning. Indications: Gastroesophageal Reflux Disease  Dose:  30 mg     levothyroxine 137 MCG Tabs  Commonly known as:  SYNTHROID   Take 137 mcg by mouth every bedtime.  Dose:  137 mcg     mercaptopurine 50 MG Tabs  Commonly known as:  PURINETHOL   Take 100 mg by mouth every day.  Dose:  100 mg     therapeutic multivitamin-minerals Tabs   Take 1 Tab by mouth every day.  Dose:  1 Tab            Allergies  Allergies   Allergen Reactions   • Pcn [Penicillins] Rash     Tolerated rocephin 6/2020   • Sulfa Drugs Unspecified     Mouth sores, swelling tongue, loss of taste       DIET  Orders Placed This Encounter   Procedures   • Diet Order Regular     Standing Status:   Standing      Number of Occurrences:   1     Order Specific Question:   Diet:     Answer:   Regular [1]       ACTIVITY  As tolerated.  Weight bearing as tolerated    CONSULTATIONS  GI    PROCEDURES      LABORATORY  Lab Results   Component Value Date    SODIUM 141 06/24/2020    POTASSIUM 4.5 06/24/2020    CHLORIDE 107 06/24/2020    CO2 26 06/24/2020    GLUCOSE 113 (H) 06/24/2020    BUN 6 (L) 06/24/2020    CREATININE 0.78 06/24/2020        Lab Results   Component Value Date    WBC 3.0 (L) 06/25/2020    HEMOGLOBIN 7.9 (L) 06/25/2020    HEMATOCRIT 25.1 (L) 06/25/2020    PLATELETCT 123 (L) 06/25/2020        Total time of the discharge process exceeds 40 minutes.

## 2020-06-25 NOTE — PROGRESS NOTES
Gastroenterology Progress Note     Author: Josué Cueva M.D.   Date & Time Created: 6/24/2020 5:21 PM    Chief Complaint:  hematochezia    Interval History:  Patient started Xarelto. One bloody BM and then one formed brown stool.     Review of Systems:  Review of Systems   Constitutional: Negative for chills and fever.   Respiratory: Negative for shortness of breath.    Cardiovascular: Negative for chest pain.   Gastrointestinal: Positive for blood in stool. Negative for abdominal pain, constipation, diarrhea, heartburn, melena, nausea and vomiting.       Physical Exam:  Physical Exam  Constitutional:       General: He is not in acute distress.     Appearance: Normal appearance. He is not ill-appearing.   Abdominal:      General: Abdomen is flat. Bowel sounds are normal. There is no distension.      Palpations: Abdomen is soft.      Tenderness: There is no abdominal tenderness.   Neurological:      Mental Status: He is alert.         Labs:          Recent Labs     06/22/20  1200 06/24/20  0701   SODIUM 137 141   POTASSIUM 3.8 4.5   CHLORIDE 100 107   CO2 26 26   BUN 11 6*   CREATININE 0.82 0.78   CALCIUM 8.6 8.2*     Recent Labs     06/22/20  1200 06/24/20  0701   ALTSGPT 12 10   ASTSGOT 16 12   ALKPHOSPHAT 53 46   TBILIRUBIN 0.4 0.3   GLUCOSE 97 113*     Recent Labs     06/22/20  1200  06/23/20  1518 06/23/20  2256 06/24/20  0701   RBC 1.82*  --   --   --  2.35*   HEMOGLOBIN 6.7*   < > 8.1* 7.2* 7.9*   HEMATOCRIT 20.3*  --   --   --  25.3*   PLATELETCT 134*  --   --   --  98*   PROTHROMBTM 21.1*  --   --   --   --    APTT 39.8*  --   --   --   --    INR 1.77*  --   --   --   --     < > = values in this interval not displayed.     Recent Labs     06/22/20  1200 06/24/20  0701   WBC 1.9* 2.9*   NEUTSPOLYS 53.00 80.90*   LYMPHOCYTES 17.00* 5.20*   MONOCYTES 8.00 6.10   EOSINOPHILS 10.00* 6.10   BASOPHILS 1.00 0.90   ASTSGOT 16 12   ALTSGPT 12 10   ALKPHOSPHAT 53 46   TBILIRUBIN 0.4 0.3     Hemodynamics:  Temp  (24hrs), Av.8 °C (98.2 °F), Min:36.3 °C (97.4 °F), Max:37.2 °C (99 °F)  Temperature: 36.9 °C (98.5 °F)  Pulse  Av.6  Min: 66  Max: 92   Blood Pressure: 108/59     Respiratory:    Respiration: 18, Pulse Oximetry: 99 %        RUL Breath Sounds: Clear, RML Breath Sounds: Clear, RLL Breath Sounds: Diminished, BRANDEE Breath Sounds: Clear, LLL Breath Sounds: Diminished  Fluids:    Intake/Output Summary (Last 24 hours) at 2020 1721  Last data filed at 2020 1500  Gross per 24 hour   Intake 340 ml   Output --   Net 340 ml     Weight: 74.7 kg (164 lb 10.9 oz)  GI/Nutrition:  Orders Placed This Encounter   Procedures   • Diet Order Regular     Standing Status:   Standing     Number of Occurrences:   1     Order Specific Question:   Diet:     Answer:   Regular [1]     Medical Decision Making, by Problem:  Active Hospital Problems    Diagnosis   • *GIB (gastrointestinal bleeding) [K92.2]   • Chronic anticoagulation [Z79.01]   • Syncope [R55]   • Ulcerative colitis (HCC) [K51.90]   • Pancytopenia (HCC) [D61.818]   • Acute pulmonary embolism without acute cor pulmonale (HCC) [I26.99]   • Hypothyroidism [E03.9]   • DNR no code (do not resuscitate) [Z66]       Plan:  Hematochezia secondary to UC - improved bleeding since steroids despite continuing Xarelto. Patient should be monitored for more bleeding for one more night. If no blood overnight, he can likely be discharged home in AM. He has f/u at the [facility] in 3 weeks.     Quality-Core Measures

## 2020-06-25 NOTE — DISCHARGE PLANNING
Care Transition Team Assessment    RN CM reviewed patient chart for assessment.  Patient lives with his wife.  On last admission, LSW assisted with getting his Xarelto approved.  Plan is for him to follow up at the [facility] in 3 weeks.    Information Source  Orientation : Oriented x 4  Information Given By: Other (Comments)(Chart Review)  Who is responsible for making decisions for patient? : Patient    Readmission Evaluation  Is this a readmission?: Yes - unplanned readmission    Elopement Risk  Legal Hold: No  Ambulatory or Self Mobile in Wheelchair: Yes  Disoriented: No  Psychiatric Symptoms: None  History of Wandering: No  Elopement this Admit: No  Vocalizing Wanting to Leave: No  Displays Behaviors, Body Language Wanting to Leave: No-Not at Risk for Elopement  Elopement Risk: Not at Risk for Elopement    Interdisciplinary Discharge Planning  Primary Care Physician: f/u at the [facility] scheduled in 3 weeks.  Lives with - Patient's Self Care Capacity: Spouse  Patient or legal guardian wants to designate a caregiver (see row info): No  Support Systems: Spouse / Significant Other  Housing / Facility: [address]  Do You Take your Prescribed Medications Regularly: Yes  Able to Return to Previous ADL's: Yes  Mobility Issues: No  Prior Services: None  Patient Expects to be Discharged to:: home  Assistance Needed: No  Durable Medical Equipment: Not Applicable    Discharge Preparedness  What is your plan after discharge?: Home with help  What are your discharge supports?: Spouse  Prior Functional Level: Independent with Activities of Daily Living, Independent with Medication Management  Difficulity with ADLs: None  Difficulity with IADLs: None    Functional Assesment  Prior Functional Level: Independent with Activities of Daily Living, Independent with Medication Management    Finances  Financial Barriers to Discharge: No  Prescription Coverage: Yes    Vision / Hearing Impairment  Vision Impairment : Yes  Right  Eye Vision: Impaired, Wears Glasses  Left Eye Vision: Impaired, Wears Glasses  Hearing Impairment : No         Advance Directive  Advance Directive?: None  Advance Directive offered?: AD Booklet refused    Domestic Abuse  Have you ever been the victim of abuse or violence?: No  Physical Abuse or Sexual Abuse: No  Verbal Abuse or Emotional Abuse: No  Possible Abuse Reported to:: Not Applicable         Discharge Risks or Barriers  Discharge risks or barriers?: No    Anticipated Discharge Information  Anticipated discharge disposition: Home  Discharge Address: [address] [place] [address] [place] [address]  Discharge Contact Phone Number: [phone]

## 2020-08-04 NOTE — ASSESSMENT & PLAN NOTE
Elevated D-Dimer on presentation   CTPE positive for pulmonary embolism   PESI score of 73, Class II, Low Risk: 1.7-3.5% 30-day mortality in this group.  Negative Troponin, no right heart strain per TTE 06/11/2020  COVID negative  Hemodynamically stable, not requiring supplemental oxygen  No recent surgery, immobilization or h/o cancer.   Positive family history of blood clot in his mother  PE likely secondary to IBD  - Discharge home today w/ Xarelto 15mg BID until 07/01/2020, followed by Xarelto 20mg daily  - Monitor for any bleeding  - Follow up with PCP in 2 weeks   23

## 2022-01-17 ENCOUNTER — TELEPHONE (OUTPATIENT)
Dept: SCHEDULING | Facility: IMAGING CENTER | Age: 67
End: 2022-01-17

## 2022-01-31 ENCOUNTER — OFFICE VISIT (OUTPATIENT)
Dept: MEDICAL GROUP | Facility: PHYSICIAN GROUP | Age: 67
End: 2022-01-31
Payer: MEDICARE

## 2022-01-31 VITALS
BODY MASS INDEX: 23.74 KG/M2 | HEART RATE: 74 BPM | SYSTOLIC BLOOD PRESSURE: 124 MMHG | RESPIRATION RATE: 20 BRPM | OXYGEN SATURATION: 99 % | TEMPERATURE: 98.9 F | WEIGHT: 185 LBS | HEIGHT: 74 IN | DIASTOLIC BLOOD PRESSURE: 74 MMHG

## 2022-01-31 DIAGNOSIS — K21.9 GASTROESOPHAGEAL REFLUX DISEASE WITHOUT ESOPHAGITIS: ICD-10-CM

## 2022-01-31 DIAGNOSIS — E03.9 HYPOTHYROIDISM, UNSPECIFIED TYPE: ICD-10-CM

## 2022-01-31 DIAGNOSIS — E78.00 PURE HYPERCHOLESTEROLEMIA: ICD-10-CM

## 2022-01-31 DIAGNOSIS — Z12.5 PROSTATE CANCER SCREENING: ICD-10-CM

## 2022-01-31 PROBLEM — K51.00 CHRONIC ULCERATIVE PANCOLITIS (HCC): Status: ACTIVE | Noted: 2018-02-14

## 2022-01-31 PROBLEM — Z79.01 CHRONIC ANTICOAGULATION: Status: RESOLVED | Noted: 2020-06-22 | Resolved: 2022-01-31

## 2022-01-31 PROBLEM — K92.2 GIB (GASTROINTESTINAL BLEEDING): Status: RESOLVED | Noted: 2020-06-22 | Resolved: 2022-01-31

## 2022-01-31 PROBLEM — I26.99 ACUTE PULMONARY EMBOLISM WITHOUT ACUTE COR PULMONALE (HCC): Status: RESOLVED | Noted: 2020-06-11 | Resolved: 2022-01-31

## 2022-01-31 PROBLEM — R00.2 PALPITATIONS: Status: RESOLVED | Noted: 2019-12-16 | Resolved: 2022-01-31

## 2022-01-31 PROBLEM — Z93.2 ILEOSTOMY STATUS (HCC): Status: ACTIVE | Noted: 2022-01-23

## 2022-01-31 PROCEDURE — 99204 OFFICE O/P NEW MOD 45 MIN: CPT | Performed by: NURSE PRACTITIONER

## 2022-01-31 RX ORDER — LEVOTHYROXINE SODIUM 137 UG/1
137 TABLET ORAL
Qty: 90 TABLET | Refills: 3 | Status: SHIPPED | OUTPATIENT
Start: 2022-01-31 | End: 2022-11-17 | Stop reason: SDUPTHER

## 2022-01-31 RX ORDER — ATORVASTATIN CALCIUM 10 MG/1
10 TABLET, FILM COATED ORAL EVERY EVENING
Qty: 90 TABLET | Refills: 3 | Status: SHIPPED | OUTPATIENT
Start: 2022-01-31 | End: 2022-11-17 | Stop reason: SDUPTHER

## 2022-01-31 RX ORDER — ATORVASTATIN CALCIUM 10 MG/1
10 TABLET, FILM COATED ORAL EVERY EVENING
Qty: 90 TABLET | Refills: 3 | Status: SHIPPED | OUTPATIENT
Start: 2022-01-31 | End: 2022-01-31 | Stop reason: SDUPTHER

## 2022-01-31 RX ORDER — ACETAMINOPHEN 160 MG
TABLET,DISINTEGRATING ORAL
COMMUNITY
End: 2022-03-28

## 2022-01-31 RX ORDER — CHLORAL HYDRATE 500 MG
1 CAPSULE ORAL
COMMUNITY
End: 2022-01-31

## 2022-01-31 RX ORDER — LANSOPRAZOLE 30 MG/1
30 CAPSULE, DELAYED RELEASE ORAL EVERY MORNING
Qty: 90 CAPSULE | Refills: 3 | Status: SHIPPED | OUTPATIENT
Start: 2022-01-31 | End: 2022-11-17 | Stop reason: SDUPTHER

## 2022-01-31 RX ORDER — LANSOPRAZOLE 30 MG/1
30 CAPSULE, DELAYED RELEASE ORAL EVERY MORNING
Qty: 90 CAPSULE | Refills: 3 | Status: SHIPPED | OUTPATIENT
Start: 2022-01-31 | End: 2022-01-31 | Stop reason: SDUPTHER

## 2022-01-31 RX ORDER — USTEKINUMAB 90 MG/ML
INJECTION, SOLUTION SUBCUTANEOUS
COMMUNITY
Start: 2020-05-14 | End: 2022-01-31

## 2022-01-31 RX ORDER — LEVOTHYROXINE SODIUM 137 UG/1
137 TABLET ORAL
Qty: 90 TABLET | Refills: 3 | Status: SHIPPED | OUTPATIENT
Start: 2022-01-31 | End: 2022-01-31 | Stop reason: SDUPTHER

## 2022-01-31 ASSESSMENT — FIBROSIS 4 INDEX: FIB4 SCORE: 2.01

## 2022-01-31 ASSESSMENT — PATIENT HEALTH QUESTIONNAIRE - PHQ9: CLINICAL INTERPRETATION OF PHQ2 SCORE: 0

## 2022-01-31 NOTE — PROGRESS NOTES
1. Pure hypercholesterolemia  - atorvastatin (LIPITOR) 10 MG Tab; Take 1 Tablet by mouth every evening.  Dispense: 90 Tablet; Refill: 3    2. Gastroesophageal reflux disease without esophagitis  - lansoprazole (PREVACID) 30 MG CAPSULE DELAYED RELEASE; Take 1 Capsule by mouth every morning. Indications: Gastroesophageal Reflux Disease  Dispense: 90 Capsule; Refill: 3    3. Hypothyroidism, unspecified type  - levothyroxine (SYNTHROID) 137 MCG Tab; Take 1 Tablet by mouth every morning on an empty stomach.  Dispense: 90 Tablet; Refill: 3

## 2022-01-31 NOTE — ASSESSMENT & PLAN NOTE
Currently taking levothyroxine 137 mcg daily as prescribed.   Denies symptoms including mood changes, anxiety/depression, chest pain/pressure, palpitations, fatigue, heat/cold intolerance, polydipsia, polyuria, diarrhea/constipation, abdominal pain, unexpected weight change, skin changes, hair thickening/coarsening/falling out/thinning, brittle nails, or edema.  Due for updated lab work.     6/11/2020 05:03   TSH 8.500 (H)   Free T-4 1.31

## 2022-01-31 NOTE — ASSESSMENT & PLAN NOTE
"Chronic, ongoing.  Currently taking Atorvastatin 10 mg as directed.  Denies side effects of the medication--no myalgias or abdominal pain.  Reports diet is \"okay\".   He is following a low-cholesterol diet.  He is exercising regularly.  He is not taking ASA daily.  He denies dizziness, claudication, or chest pain.  Due for updated lab work.  "

## 2022-01-31 NOTE — ASSESSMENT & PLAN NOTE
Chronic and ongoing. Currently taking Lansoprazole 30 mg. He states that it does help with his symptoms. He takes it every other day and that seems to be managing things well.

## 2022-01-31 NOTE — PROGRESS NOTES
"Subjective  Chief Complaint  Establish care to manage his chronic conditions    History of Present Illness  Arsen Stroud is a 67 y.o. male. This patient is here today to establish care.    Gastroesophageal reflux disease without esophagitis  Chronic and ongoing. Currently taking Lansoprazole 30 mg. He states that it does help with his symptoms. He takes it every other day and that seems to be managing things well.    Hypothyroidism  Currently taking levothyroxine 137 mcg daily as prescribed.   Denies symptoms including mood changes, anxiety/depression, chest pain/pressure, palpitations, fatigue, heat/cold intolerance, polydipsia, polyuria, diarrhea/constipation, abdominal pain, unexpected weight change, skin changes, hair thickening/coarsening/falling out/thinning, brittle nails, or edema.  Due for updated lab work.     6/11/2020 05:03   TSH 8.500 (H)   Free T-4 1.31       Pure hypercholesterolemia  Chronic, ongoing.  Currently taking Atorvastatin 10 mg as directed.  Denies side effects of the medication--no myalgias or abdominal pain.  Reports diet is \"okay\".   He is following a low-cholesterol diet.  He is exercising regularly.  He is not taking ASA daily.  He denies dizziness, claudication, or chest pain.  Due for updated lab work.    Past Medical History    Allergies: Pcn [penicillins] and Sulfa drugs  Past Medical History:   Diagnosis Date   • Acute pulmonary embolism without acute cor pulmonale (HCC) 6/11/2020   • Arrhythmia     reports occasional \"Fluttering\", states work up neg.    • Arthritis     knees   • Chronic anticoagulation 6/22/2020   • GIB (gastrointestinal bleeding) 6/22/2020   • Hiatus hernia syndrome    • High cholesterol    • Hypothyroid    • Palpitations 12/16/2019   • Pulmonary emboli (HCC)    • Sleep apnea     had a sleep study. doesn't use cpap   • Snoring    • Tear of medial cartilage or meniscus of knee, current 2/12/2015   • Ulcerative colitis (HCC)      Past Surgical History: "   Procedure Laterality Date   • KNEE ARTHROSCOPY  2/12/2015    Performed by Greg Chaves M.D. at SURGERY [facility]   • MENISCECTOMY, KNEE, MEDIAL  2/12/2015    Performed by Greg hCaves M.D. at SURGERY [facility]   • KNEE ARTHROPLASTY TOTAL  2013    Knee Replacement, Total, left   • FLAP GRAFT  6/20/2012    Performed by GREG NAGY at SURGERY [facility]   • FULL THICKNESS SKIN GRAFT  6/20/2012    Performed by GREG NAGY at [facility]   • PIN INSERTION  5/4/2012    Performed by GREG NAGY at SURGERY [facility]   • NERVE REPAIR  5/4/2012    Performed by GREG NAGY at SURGERY [facility]   • ARTERY EXPLORATION OR REPAIR  5/4/2012    Performed by GREG NAGY at SURGERY [facility]   • LIGAMENT REPAIR  5/4/2012    Performed by GREG NAGY at SURGERY [facility]     Current Outpatient Medications Ordered in Epic   Medication Sig Dispense Refill   • CALCIUM PO Take 1 Tablet by mouth every day.     • Cholecalciferol (VITAMIN D3) 2000 UNIT Cap Take  by mouth.     • levothyroxine (SYNTHROID) 137 MCG Tab Take 1 Tablet by mouth every morning on an empty stomach. 90 Tablet 3   • atorvastatin (LIPITOR) 10 MG Tab Take 1 Tablet by mouth every evening. 90 Tablet 3   • lansoprazole (PREVACID) 30 MG CAPSULE DELAYED RELEASE Take 1 Capsule by mouth every morning. Indications: Gastroesophageal Reflux Disease 90 Capsule 3   • Omega-3 Fatty Acids (FISH OIL) 1000 MG CAPS capsule Take 1,000 mg by mouth every morning.       No current [facility]-ordered facility-administered medications on file.     Family History:    History reviewed. No pertinent family history.   Personal/Social History:    Social History     Tobacco Use   • Smoking status: Never Smoker   • Smokeless tobacco: Never Used   Vaping Use   • Vaping Use: Never used   Substance Use Topics   • Alcohol use: No   • Drug use: No     Social History     Social History Narrative   • Not on file     "  Review of Systems:     General: Negative for fever/chills and unexpected weight change.    Respiratory:  Negative for cough and dyspnea.     Cardiovascular:  Negative for chest pain and palpitations.   Musculoskeletal:  Negative for myalgias.    Skin:  Negative for rash.     Objective  Physical Exam:   /74 (BP Location: Left arm, Patient Position: Sitting, BP Cuff Size: Adult)   Pulse 74   Temp 37.2 °C (98.9 °F) (Temporal)   Resp 20   Ht 1.88 m (6' 2\")   Wt 83.9 kg (185 lb)   SpO2 99%  Body mass index is 23.75 kg/m².  General:  Alert and oriented.  Well appearing.  NAD.  Head:  Normocephalic.   Neck: Supple without JVD. No lymphadenopathy.  Pulmonary:  Normal effort.  Clear to ausculation without rales, ronchi, or wheezing.  Cardiovascular:  Regular rate and rhythm without murmur, rubs or gallop.  Radial pulses are intact and equal bilaterally.  Musculoskeletal:  No extremity cyanosis, clubbing, or edema.  Skin:  Warm and dry.  No obvious lesions.  Psych: Normal mood and affect. Alert and oriented x3. Judgment and insight is normal.      Assessment/Plan   1. Pure hypercholesterolemia  Chronic and ongoing.  Continue to take Atorvastatin 10 mg every evening.  Educated on a healthy diet and exercise.  Due for updated labs.  - Lipid Profile; Future  - atorvastatin (LIPITOR) 10 MG Tab; Take 1 Tablet by mouth every evening.  Dispense: 90 Tablet; Refill: 3    2. Hypothyroidism, unspecified type  Chronic and ongoing.  Continue to take Levothyroxine 137 mcg every morning on an empty stomach.  Due for updated labs.  - TSH; Future  - FREE THYROXINE; Future  - TRIIDOTHYRONINE; Future  - levothyroxine (SYNTHROID) 137 MCG Tab; Take 1 Tablet by mouth every morning on an empty stomach.  Dispense: 90 Tablet; Refill: 3    3. Gastroesophageal reflux disease without esophagitis  Chronic and ongoing.  Continue to take Lansoprazole 30 mg daily.  - lansoprazole (PREVACID) 30 MG CAPSULE DELAYED RELEASE; Take 1 Capsule by " mouth every morning. Indications: Gastroesophageal Reflux Disease  Dispense: 90 Capsule; Refill: 3    4. Prostate cancer screening  Due for updated labs.  - PROSTATE SPECIFIC AG SCREENING; Future      Health Maintenance: Records Requseted    Return if symptoms worsen or fail to improve.    Please note that this dictation was created using voice recognition software. I have made every reasonable attempt to correct obvious errors, but I expect that there are errors of grammar and possibly content that I did not discover before finalizing the note.    SHARITA Beard  [facility]

## 2022-02-04 NOTE — DISCHARGE INSTRUCTIONS
Discharge Instructions    Discharged to home by car with relative. Discharged via wheelchair, hospital escort: Yes.  Special equipment needed: Not Applicable    Be sure to schedule a follow-up appointment with your primary care doctor or any specialists as instructed.     Discharge Plan:        I understand that a diet low in cholesterol, fat, and sodium is recommended for good health. Unless I have been given specific instructions below for another diet, I accept this instruction as my diet prescription.   Other diet: Regular    Special Instructions: N/A    · Is patient discharged on Warfarin / Coumadin?   No     Depression / Suicide Risk    As you are discharged from this [facility] facility, it is important to learn how to keep safe from harming yourself.    Recognize the warning signs:  · Abrupt changes in personality, positive or negative- including increase in energy   · Giving away possessions  · Change in eating patterns- significant weight changes-  positive or negative  · Change in sleeping patterns- unable to sleep or sleeping all the time   · Unwillingness or inability to communicate  · Depression  · Unusual sadness, discouragement and loneliness  · Talk of wanting to die  · Neglect of personal appearance   · Rebelliousness- reckless behavior  · Withdrawal from people/activities they love  · Confusion- inability to concentrate     If you or a loved one observes any of these behaviors or has concerns about self-harm, here's what you can do:  · Talk about it- your feelings and reasons for harming yourself  · Remove any means that you might use to hurt yourself (examples: pills, rope, extension cords, firearm)  · Get professional help from the community (Mental Health, Substance Abuse, psychological counseling)  · Do not be alone:Call your Safe Contact- someone whom you trust who will be there for you.  · Call your local CRISIS HOTLINE 214-6381 or 714-756-1121  · Call your local Children's Mobile Crisis  no Response Team [facility] (041) 015-9982 or www.[employer]  · Call the toll free National Suicide Prevention Hotlines   · National Suicide Prevention Lifeline 706-436-XRSW (1976)  · National Hope Line Network 800-SUICIDE (246-8330)        Pulmonary Embolism  A pulmonary embolism (PE) is a sudden blockage or decrease of blood flow in one lung or both lungs. Most blockages come from a blood clot that travels from the legs or the pelvis to the lungs. PE is a dangerous and potentially life-threatening condition if it is not treated right away.  What are the causes?  A pulmonary embolism occurs most commonly when a blood clot travels from one of your veins to your lungs. Rarely, PE is caused by air, fat, amniotic fluid, or part of a tumor traveling through your veins to your lungs.  What increases the risk?  A PE is more likely to develop in:  · People who smoke.  · People who are older, especially over 60 years of age.  · People who are overweight (obese).  · People who sit or lie still for a long time, such as during long-distance travel (over 4 hours), bed rest, hospitalization, or during recovery from certain medical conditions like a stroke.  · People who do not engage in much physical activity (sedentary lifestyle).  · People who have chronic breathing disorders.  · People who have a personal or family history of blood clots or blood clotting disease.  · People who have peripheral vascular disease (PVD), diabetes, or some types of cancer.  · People who have heart disease, especially if the person had a recent heart attack or has congestive heart failure.  · People who have neurological diseases that affect the legs (leg paresis).  · People who have had a traumatic injury, such as breaking a hip or leg.  · People who have recently had major or lengthy surgery, especially on the hip, knee, or abdomen.  · People who have had a central line placed inside a large vein.  · People who take medicines that contain  the hormone estrogen. These include birth control pills and hormone replacement therapy.  · Pregnancy or during childbirth or the postpartum period.  What are the signs or symptoms?  The symptoms of a PE usually start suddenly and include:  · Shortness of breath while active or at rest.  · Coughing or coughing up blood or blood-tinged mucus.  · Chest pain that is often worse with deep breaths.  · Rapid or irregular heartbeat.  · Feeling light-headed or dizzy.  · Fainting.  · Feeling anxious.  · Sweating.  There may also be pain and swelling in a leg if that is where the blood clot started.  These symptoms may represent a serious problem that is an emergency. Do not wait to see if the symptoms will go away. Get medical help right away. Call your local emergency services (911 in the [place] Do not drive yourself to the hospital.   How is this diagnosed?  Your health care provider will take a medical history and perform a physical exam. You may also have other tests, including:  · Blood tests to assess the clotting properties of your blood, assess oxygen levels in your blood, and find blood clots.  · Imaging tests, such as CT, ultrasound, MRI, X-ray, and other tests to see if you have clots anywhere in your body.  · An electrocardiogram (ECG) to look for heart strain from blood clots in the lungs.  How is this treated?  The main goals of PE treatment are:  · To stop a blood clot from growing larger.  · To stop new blood clots from forming.  The type of treatment that you receive depends on many factors, such as the cause of your PE, your risk for bleeding or developing more clots, and other medical conditions that you have. Sometimes, a combination of treatments is necessary.  This condition may be treated with:  · Medicines, including newer oral blood thinners (anticoagulants), warfarin, low molecular weight heparins, thrombolytics, or heparins.  · Wearing compression stockings or using different types of  devices.  · Surgery (rare) to remove the blood clot or to place a filter in your abdomen to stop the blood clot from traveling to your lungs.  Treatments for a PE are often divided into immediate treatment, long-term treatment (up to 3 months after PE), and extended treatment (more than 3 months after PE). Your treatment may continue for several months. This is called maintenance therapy, and it is used to prevent the forming of new blood clots. You can work with your health care provider to choose the treatment program that is best for you.  What are anticoagulants?   Anticoagulants are medicines that treat PEs. They can stop current blood clots from growing and stop new clots from forming. They cannot dissolve existing clots. Your body dissolves clots by itself over time. Anticoagulants are given by mouth, by injection, or through an IV tube.  What are thrombolytics?   Thrombolytics are clot-dissolving medicines that are used to dissolve a PE. They carry a high risk of bleeding, so they tend to be used only in severe cases or if you have very low blood pressure.  Follow these instructions at home:  If you are taking a newer oral anticoagulant:  · Take the medicine every single day at the same time each day.  · Understand what foods and drugs interact with this medicine.  · Understand that there are no regular blood tests required when using this medicine.  · Understand the side effects of this medicine, including excessive bruising or bleeding. Ask your health care provider or pharmacist about other possible side effects.  If you are taking warfarin:  · Understand how to take warfarin and know which foods can affect how warfarin works in your body.  · Understand that it is dangerous to take too much or too little warfarin. Too much warfarin increases the risk of bleeding. Too little warfarin continues to allow the risk for blood clots.  · Follow your PT and INR blood testing schedule. The PT and INR results allow  your health care provider to adjust your dose of warfarin. It is very important that you have your PT and INR tested as often as told by your health care provider.  · Avoid major changes in your diet, or tell your health care provider before you change your diet. Arrange a visit with a registered dietitian to answer your questions. Many foods, especially foods that are high in vitamin K, can interfere with warfarin and affect the PT and INR results. Eat a consistent amount of foods that are high in vitamin K, such as:  ¨ Spinach, kale, broccoli, cabbage, baron greens, turnip greens, Pyote sprouts, peas, cauliflower, seaweed, and parsley.  ¨ Beef liver and pork liver.  ¨ Green tea.  ¨ Soybean oil.  · Tell your health care provider about any and all medicines, vitamins, and supplements that you take, including aspirin and other over-the-counter anti-inflammatory medicines. Be especially cautious with aspirin and anti-inflammatory medicines. Do not take those before you ask your health care provider if it is safe to do so. This is important because many medicines can interfere with warfarin and affect the PT and INR results.  · Do not start or stop taking any over-the-counter or prescription medicine unless your health care provider or pharmacist tells you to do so.  If you take warfarin, you will also need to do these things:  · Hold pressure over cuts for longer than usual.  · Tell your dentist and other health care providers that you are taking warfarin before you have any procedures in which bleeding may occur.  · Avoid alcohol or drink very small amounts. Tell your health care provider if you change your alcohol intake.  · Do not use tobacco products, including cigarettes, chewing tobacco, and e-cigarettes. If you need help quitting, ask your health care provider.  · Avoid contact sports.  General instructions  · Take over-the-counter and prescription medicines only as told by your health care provider.  Anticoagulant medicines can have side effects, including easy bruising and difficulty stopping bleeding. If you are prescribed an anticoagulant, you will also need to do these things:  ¨ Hold pressure over cuts for longer than usual.  ¨ Tell your dentist and other health care providers that you are taking anticoagulants before you have any procedures in which bleeding may occur.  ¨ Avoid contact sports.  · Wear a medical alert bracelet or carry a medical alert card that says you have had a PE.  · Ask your health care provider how soon you can go back to your normal activities. Stay active to prevent new blood clots from forming.  · Make sure to exercise while traveling or when you have been sitting or standing for a long period of time. It is very important to exercise. Exercise your legs by walking or by tightening and relaxing your leg muscles often. Take frequent walks.  · Wear compression stockings as told by your health care provider to help prevent more blood clots from forming.  · Do not use tobacco products, including cigarettes, chewing tobacco, and e-cigarettes. If you need help quitting, ask your health care provider.  · Keep all follow-up appointments with your health care provider. This is important.  How is this prevented?  Take these actions to decrease your risk of developing another PE:  · Exercise regularly. For at least 30 minutes every day, engage in:  ¨ Activity that involves moving your arms and legs.  ¨ Activity that encourages good blood flow through your body by increasing your heart rate.  · Exercise your arms and legs every hour during long-distance travel (over 4 hours). Drink plenty of water and avoid drinking alcohol while traveling.  · Avoid sitting or lying in bed for long periods of time without moving your legs.  · Maintain a weight that is appropriate for your height. Ask your health care provider what weight is healthy for you.  · If you are a woman who is over 35 years of age,  avoid unnecessary use of medicines that contain estrogen. These include birth control pills.  · Do not smoke, especially if you take estrogen medicines. If you need help quitting, ask your health care provider.  · If you are at very high risk for PE, wear compression stockings.  · If you recently had a PE, have regularly scheduled ultrasound testing on your legs to check for new blood clots.  If you are hospitalized, prevention measures may include:  · Early walking after surgery, as soon as your health care provider says that it is safe.  · Receiving anticoagulants to prevent blood clots. If you cannot take anticoagulants, other options may be available, such as wearing compression stockings or using different types of devices.  Get help right away if:  · You have new or increased pain, swelling, or redness in an arm or leg.  · You have numbness or tingling in an arm or leg.  · You have shortness of breath while active or at rest.  · You have chest pain.  · You have a rapid or irregular heartbeat.  · You feel light-headed or dizzy.  · You cough up blood.  · You notice blood in your vomit, bowel movement, or urine.  · You have a fever.  These symptoms may represent a serious problem that is an emergency. Do not wait to see if the symptoms will go away. Get medical help right away. Call your local emergency services (911 in the [place] Do not drive yourself to the hospital.   This information is not intended to replace advice given to you by your health care provider. Make sure you discuss any questions you have with your health care provider.  Document Released: 12/15/2001 Document Revised: 05/25/2017 Document Reviewed: 04/13/2016  [employer] Patient Education © 2017 [employer]

## 2022-02-07 ENCOUNTER — HOSPITAL ENCOUNTER (OUTPATIENT)
Dept: LAB | Facility: MEDICAL CENTER | Age: 67
End: 2022-02-07
Attending: NURSE PRACTITIONER
Payer: MEDICARE

## 2022-02-07 DIAGNOSIS — Z12.5 PROSTATE CANCER SCREENING: ICD-10-CM

## 2022-02-07 DIAGNOSIS — E03.9 HYPOTHYROIDISM, UNSPECIFIED TYPE: ICD-10-CM

## 2022-02-07 DIAGNOSIS — E78.00 PURE HYPERCHOLESTEROLEMIA: ICD-10-CM

## 2022-02-07 LAB
CHOLEST SERPL-MCNC: 130 MG/DL (ref 100–199)
FASTING STATUS PATIENT QL REPORTED: NORMAL
HDLC SERPL-MCNC: 52 MG/DL
LDLC SERPL CALC-MCNC: 63 MG/DL
PSA SERPL-MCNC: 3.23 NG/ML (ref 0–4)
T3 SERPL-MCNC: 109 NG/DL (ref 60–181)
T4 FREE SERPL-MCNC: 1.56 NG/DL (ref 0.93–1.7)
TRIGL SERPL-MCNC: 75 MG/DL (ref 0–149)
TSH SERPL DL<=0.005 MIU/L-ACNC: 1.83 UIU/ML (ref 0.38–5.33)

## 2022-02-07 PROCEDURE — 84443 ASSAY THYROID STIM HORMONE: CPT

## 2022-02-07 PROCEDURE — 80061 LIPID PANEL: CPT

## 2022-02-07 PROCEDURE — 84153 ASSAY OF PSA TOTAL: CPT | Mod: GA

## 2022-02-07 PROCEDURE — 36415 COLL VENOUS BLD VENIPUNCTURE: CPT

## 2022-02-07 PROCEDURE — 84439 ASSAY OF FREE THYROXINE: CPT

## 2022-02-07 PROCEDURE — 84480 ASSAY TRIIODOTHYRONINE (T3): CPT

## 2022-03-28 ENCOUNTER — HOSPITAL ENCOUNTER (OUTPATIENT)
Facility: MEDICAL CENTER | Age: 67
End: 2022-03-30
Attending: EMERGENCY MEDICINE | Admitting: HOSPITALIST
Payer: MEDICARE

## 2022-03-28 ENCOUNTER — APPOINTMENT (OUTPATIENT)
Dept: RADIOLOGY | Facility: MEDICAL CENTER | Age: 67
End: 2022-03-28
Attending: EMERGENCY MEDICINE
Payer: MEDICARE

## 2022-03-28 PROBLEM — T81.31XS WOUND DEHISCENCE, SURGICAL, SEQUELA: Status: ACTIVE | Noted: 2022-03-28

## 2022-03-28 LAB
ALBUMIN SERPL BCP-MCNC: 3.9 G/DL (ref 3.2–4.9)
ALBUMIN/GLOB SERPL: 1.3 G/DL
ALP SERPL-CCNC: 96 U/L (ref 30–99)
ALT SERPL-CCNC: 19 U/L (ref 2–50)
ANION GAP SERPL CALC-SCNC: 9 MMOL/L (ref 7–16)
AST SERPL-CCNC: 21 U/L (ref 12–45)
BASOPHILS # BLD AUTO: 1.3 % (ref 0–1.8)
BASOPHILS # BLD: 0.09 K/UL (ref 0–0.12)
BILIRUB SERPL-MCNC: 1 MG/DL (ref 0.1–1.5)
BUN SERPL-MCNC: 9 MG/DL (ref 8–22)
CALCIUM SERPL-MCNC: 9.4 MG/DL (ref 8.5–10.5)
CHLORIDE SERPL-SCNC: 105 MMOL/L (ref 96–112)
CO2 SERPL-SCNC: 27 MMOL/L (ref 20–33)
CREAT SERPL-MCNC: 0.85 MG/DL (ref 0.5–1.4)
EOSINOPHIL # BLD AUTO: 0.7 K/UL (ref 0–0.51)
EOSINOPHIL NFR BLD: 9.9 % (ref 0–6.9)
ERYTHROCYTE [DISTWIDTH] IN BLOOD BY AUTOMATED COUNT: 46.9 FL (ref 35.9–50)
GFR SERPLBLD CREATININE-BSD FMLA CKD-EPI: 96 ML/MIN/1.73 M 2
GLOBULIN SER CALC-MCNC: 3.1 G/DL (ref 1.9–3.5)
GLUCOSE SERPL-MCNC: 89 MG/DL (ref 65–99)
HCT VFR BLD AUTO: 47.7 % (ref 42–52)
HGB BLD-MCNC: 15.6 G/DL (ref 14–18)
IMM GRANULOCYTES # BLD AUTO: 0.03 K/UL (ref 0–0.11)
IMM GRANULOCYTES NFR BLD AUTO: 0.4 % (ref 0–0.9)
LYMPHOCYTES # BLD AUTO: 0.69 K/UL (ref 1–4.8)
LYMPHOCYTES NFR BLD: 9.7 % (ref 22–41)
MAGNESIUM SERPL-MCNC: 2.3 MG/DL (ref 1.5–2.5)
MCH RBC QN AUTO: 32 PG (ref 27–33)
MCHC RBC AUTO-ENTMCNC: 32.7 G/DL (ref 33.7–35.3)
MCV RBC AUTO: 97.7 FL (ref 81.4–97.8)
MONOCYTES # BLD AUTO: 0.49 K/UL (ref 0–0.85)
MONOCYTES NFR BLD AUTO: 6.9 % (ref 0–13.4)
NEUTROPHILS # BLD AUTO: 5.1 K/UL (ref 1.82–7.42)
NEUTROPHILS NFR BLD: 71.8 % (ref 44–72)
NRBC # BLD AUTO: 0 K/UL
NRBC BLD-RTO: 0 /100 WBC
PLATELET # BLD AUTO: 236 K/UL (ref 164–446)
PMV BLD AUTO: 9.1 FL (ref 9–12.9)
POTASSIUM SERPL-SCNC: 3.9 MMOL/L (ref 3.6–5.5)
PROT SERPL-MCNC: 7 G/DL (ref 6–8.2)
RBC # BLD AUTO: 4.88 M/UL (ref 4.7–6.1)
SODIUM SERPL-SCNC: 141 MMOL/L (ref 135–145)
WBC # BLD AUTO: 7.1 K/UL (ref 4.8–10.8)

## 2022-03-28 PROCEDURE — 80053 COMPREHEN METABOLIC PANEL: CPT

## 2022-03-28 PROCEDURE — 700111 HCHG RX REV CODE 636 W/ 250 OVERRIDE (IP): Performed by: HOSPITALIST

## 2022-03-28 PROCEDURE — G0378 HOSPITAL OBSERVATION PER HR: HCPCS

## 2022-03-28 PROCEDURE — 99285 EMERGENCY DEPT VISIT HI MDM: CPT

## 2022-03-28 PROCEDURE — 99220 PR INITIAL OBSERVATION CARE,LEVL III: CPT | Performed by: HOSPITALIST

## 2022-03-28 PROCEDURE — 302098 PASTE RING (FLAT): Performed by: HOSPITALIST

## 2022-03-28 PROCEDURE — 90471 IMMUNIZATION ADMIN: CPT

## 2022-03-28 PROCEDURE — 90662 IIV NO PRSV INCREASED AG IM: CPT | Performed by: HOSPITALIST

## 2022-03-28 PROCEDURE — A9270 NON-COVERED ITEM OR SERVICE: HCPCS | Performed by: HOSPITALIST

## 2022-03-28 PROCEDURE — 97602 WOUND(S) CARE NON-SELECTIVE: CPT

## 2022-03-28 PROCEDURE — 700105 HCHG RX REV CODE 258: Performed by: HOSPITALIST

## 2022-03-28 PROCEDURE — 85025 COMPLETE CBC W/AUTO DIFF WBC: CPT

## 2022-03-28 PROCEDURE — 83735 ASSAY OF MAGNESIUM: CPT

## 2022-03-28 PROCEDURE — 74177 CT ABD & PELVIS W/CONTRAST: CPT | Mod: MG

## 2022-03-28 PROCEDURE — 36415 COLL VENOUS BLD VENIPUNCTURE: CPT

## 2022-03-28 PROCEDURE — 700117 HCHG RX CONTRAST REV CODE 255: Performed by: EMERGENCY MEDICINE

## 2022-03-28 PROCEDURE — 700102 HCHG RX REV CODE 250 W/ 637 OVERRIDE(OP): Performed by: HOSPITALIST

## 2022-03-28 RX ORDER — OMEPRAZOLE 20 MG/1
20 CAPSULE, DELAYED RELEASE ORAL DAILY
Status: DISCONTINUED | OUTPATIENT
Start: 2022-03-29 | End: 2022-03-30 | Stop reason: HOSPADM

## 2022-03-28 RX ORDER — LANSOPRAZOLE 30 MG/1
30 CAPSULE, DELAYED RELEASE ORAL EVERY MORNING
Status: DISCONTINUED | OUTPATIENT
Start: 2022-03-28 | End: 2022-03-28

## 2022-03-28 RX ORDER — FLUTICASONE PROPIONATE 50 MCG
2 SPRAY, SUSPENSION (ML) NASAL DAILY
Status: DISCONTINUED | OUTPATIENT
Start: 2022-03-28 | End: 2022-03-30 | Stop reason: HOSPADM

## 2022-03-28 RX ORDER — IBUPROFEN 200 MG
950 CAPSULE ORAL DAILY
COMMUNITY

## 2022-03-28 RX ORDER — MULTIVIT-MIN/IRON/FOLIC ACID/K 18-600-40
1000 CAPSULE ORAL DAILY
COMMUNITY
End: 2023-11-06

## 2022-03-28 RX ORDER — FLUTICASONE PROPIONATE 50 MCG
2 SPRAY, SUSPENSION (ML) NASAL DAILY
Status: DISCONTINUED | OUTPATIENT
Start: 2022-03-29 | End: 2022-03-28

## 2022-03-28 RX ORDER — SODIUM CHLORIDE, SODIUM LACTATE, POTASSIUM CHLORIDE, CALCIUM CHLORIDE 600; 310; 30; 20 MG/100ML; MG/100ML; MG/100ML; MG/100ML
INJECTION, SOLUTION INTRAVENOUS CONTINUOUS
Status: DISCONTINUED | OUTPATIENT
Start: 2022-03-28 | End: 2022-03-30 | Stop reason: HOSPADM

## 2022-03-28 RX ORDER — ONDANSETRON 2 MG/ML
4 INJECTION INTRAMUSCULAR; INTRAVENOUS EVERY 4 HOURS PRN
Status: DISCONTINUED | OUTPATIENT
Start: 2022-03-28 | End: 2022-03-30 | Stop reason: HOSPADM

## 2022-03-28 RX ORDER — ATORVASTATIN CALCIUM 10 MG/1
10 TABLET, FILM COATED ORAL EVERY EVENING
Status: DISCONTINUED | OUTPATIENT
Start: 2022-03-28 | End: 2022-03-30 | Stop reason: HOSPADM

## 2022-03-28 RX ORDER — ACETAMINOPHEN 325 MG/1
650 TABLET ORAL EVERY 6 HOURS PRN
Status: DISCONTINUED | OUTPATIENT
Start: 2022-03-28 | End: 2022-03-30 | Stop reason: HOSPADM

## 2022-03-28 RX ORDER — LEVOTHYROXINE SODIUM 137 UG/1
137 TABLET ORAL
Status: DISCONTINUED | OUTPATIENT
Start: 2022-03-29 | End: 2022-03-30 | Stop reason: HOSPADM

## 2022-03-28 RX ORDER — ONDANSETRON 4 MG/1
4 TABLET, ORALLY DISINTEGRATING ORAL EVERY 4 HOURS PRN
Status: DISCONTINUED | OUTPATIENT
Start: 2022-03-28 | End: 2022-03-30 | Stop reason: HOSPADM

## 2022-03-28 RX ADMIN — IOHEXOL 100 ML: 350 INJECTION, SOLUTION INTRAVENOUS at 15:24

## 2022-03-28 RX ADMIN — SODIUM CHLORIDE, POTASSIUM CHLORIDE, SODIUM LACTATE AND CALCIUM CHLORIDE: 600; 310; 30; 20 INJECTION, SOLUTION INTRAVENOUS at 18:37

## 2022-03-28 RX ADMIN — FLUTICASONE PROPIONATE 100 MCG: 50 SPRAY, METERED NASAL at 21:41

## 2022-03-28 RX ADMIN — INFLUENZA A VIRUS A/VICTORIA/2570/2019 IVR-215 (H1N1) ANTIGEN (FORMALDEHYDE INACTIVATED), INFLUENZA A VIRUS A/TASMANIA/503/2020 IVR-221 (H3N2) ANTIGEN (FORMALDEHYDE INACTIVATED), INFLUENZA B VIRUS B/PHUKET/3073/2013 ANTIGEN (FORMALDEHYDE INACTIVATED), AND INFLUENZA B VIRUS B/WASHINGTON/02/2019 ANTIGEN (FORMALDEHYDE INACTIVATED) 0.7 ML: 60; 60; 60; 60 INJECTION, SUSPENSION INTRAMUSCULAR at 21:41

## 2022-03-28 ASSESSMENT — LIFESTYLE VARIABLES
ALCOHOL_USE: NO
ON A TYPICAL DAY WHEN YOU DRINK ALCOHOL HOW MANY DRINKS DO YOU HAVE: 0
DO YOU DRINK ALCOHOL: NO
EVER HAD A DRINK FIRST THING IN THE MORNING TO STEADY YOUR NERVES TO GET RID OF A HANGOVER: NO
TOTAL SCORE: 0
CONSUMPTION TOTAL: NEGATIVE
HAVE YOU EVER FELT YOU SHOULD CUT DOWN ON YOUR DRINKING: NO
TOTAL SCORE: 0
HAVE PEOPLE ANNOYED YOU BY CRITICIZING YOUR DRINKING: NO
AVERAGE NUMBER OF DAYS PER WEEK YOU HAVE A DRINK CONTAINING ALCOHOL: 0
TOTAL SCORE: 0
DOES PATIENT WANT TO STOP DRINKING: NO
HOW MANY TIMES IN THE PAST YEAR HAVE YOU HAD 5 OR MORE DRINKS IN A DAY: 0
EVER FELT BAD OR GUILTY ABOUT YOUR DRINKING: NO

## 2022-03-28 ASSESSMENT — COGNITIVE AND FUNCTIONAL STATUS - GENERAL
SUGGESTED CMS G CODE MODIFIER MOBILITY: CH
SUGGESTED CMS G CODE MODIFIER DAILY ACTIVITY: CH
MOBILITY SCORE: 24
DAILY ACTIVITIY SCORE: 24

## 2022-03-28 ASSESSMENT — FIBROSIS 4 INDEX: FIB4 SCORE: 2.01

## 2022-03-28 ASSESSMENT — ENCOUNTER SYMPTOMS
ABDOMINAL PAIN: 0
PALPITATIONS: 0
FEVER: 0
BLURRED VISION: 0
DIZZINESS: 0
DIARRHEA: 0
HEADACHES: 0
SORE THROAT: 0
COUGH: 0
NAUSEA: 0
DOUBLE VISION: 0
BACK PAIN: 0
VOMITING: 0
CHILLS: 0
SHORTNESS OF BREATH: 0
LOSS OF CONSCIOUSNESS: 0

## 2022-03-28 ASSESSMENT — PAIN DESCRIPTION - PAIN TYPE: TYPE: ACUTE PAIN

## 2022-03-28 NOTE — H&P
"Hospital Medicine History & Physical Note    Date of Service  3/28/2022    Primary Care Physician  WAYNE Mckay.    Consultants      Specialist Names:     Code Status  Full Code    Chief Complaint  Chief Complaint   Patient presents with   • Other     Pt has a stoma on RLQ abdomen. Stoma keeps leaking \"from a hole located below the stoma\". Leakage started 2 days ago.       History of Presenting Illness  Arsen Stroud is a 67 y.o. male who presented 3/28/2022 with a previous medical history that includes ulcerative colitis now s/p proctoscopy total colectomy with end ileostomy.  He had a parastomal hernia which was repaired in March of this year at [facility].  He also has a medical history that includes dyslipidemia, hypothyroidism, and distant history of provoked PE now off of anticoagulation.    Patient presents today with abnormal discharge from his ostomy.  He notes drainage coming from beneath the ostomy site which looks feculent.  Patient notes some dizziness when he stands up quickly.  He has had a little bit more gas pain in his belly than he has considers normal but otherwise is feeling well.  He denies any fever chills, nausea or vomiting.  Ostomy output has been normal in terms of quantity.  There is been no blood or melena.  No unusual back pain.    Case has been discussed by the emergency room physician with surgery on-call for our facility Dr. Gabriel who recommends local wound care, TPN to decrease output, and follow up with the patient's [place] surgeons.  The ER physician discussed it as well with Dr. Rogers's partner on call for Drive call the patient surgeon from [facility].  Their recommendations were similar to our surgeons.    I discussed the plan of care with patient and ERP.    Review of Systems  Review of Systems   Constitutional: Negative for chills and fever.   HENT: Negative for nosebleeds and sore throat.    Eyes: Negative for blurred vision and double vision. "   Respiratory: Negative for cough and shortness of breath.    Cardiovascular: Negative for chest pain, palpitations and leg swelling.   Gastrointestinal: Negative for abdominal pain, diarrhea, nausea and vomiting.   Genitourinary: Negative for dysuria and urgency.   Musculoskeletal: Negative for back pain.   Skin: Negative for rash.   Neurological: Negative for dizziness, loss of consciousness and headaches.       Past Medical History   has a past medical history of Acute pulmonary embolism without acute cor pulmonale (HCC) (6/11/2020), Arrhythmia, Arthritis, Chronic anticoagulation (6/22/2020), GIB (gastrointestinal bleeding) (6/22/2020), Hiatus hernia syndrome, High cholesterol, Hypothyroid, Palpitations (12/16/2019), Pulmonary emboli ([facility]), Sleep apnea, Snoring, Tear of medial cartilage or meniscus of knee, current (2/12/2015), and Ulcerative colitis ([facility]).    Surgical History   has a past surgical history that includes pin insertion (5/4/2012); nerve repair (5/4/2012); artery exploration or repair (5/4/2012); ligament repair (5/4/2012); flap graft (6/20/2012); full thickness skin graft (6/20/2012); knee arthroplasty total (2013); knee arthroscopy (2/12/2015); and meniscectomy, knee, medial (2/12/2015).     Family History  family history is not on file.   Family history reviewed with patient. There is no family history that is pertinent to the chief complaint.     Social History   reports that he has never smoked. He has never used smokeless tobacco. He reports that he does not drink alcohol and does not use drugs.    Allergies  Allergies   Allergen Reactions   • Sulfa Drugs Rash, Swelling and Unspecified     Mouth sores, swelling tongue, loss of taste  Mouth sores, swelling tongue, loss of taste   • Pcn [Penicillins] Rash     Tolerated rocephin 6/2020    As a young child       Medications  Prior to Admission Medications   Prescriptions Last Dose Informant Patient Reported? Taking?   Omega-3 Fatty Acids (FISH  OIL) 1000 MG CAPS capsule 3/28/2022 at 0800 Patient Yes No   Sig: Take 1,000 mg by mouth every morning.   Vitamin D, Cholecalciferol, (CHOLECALCIFEROL) 25 MCG (1000 UT) Tab 3/28/2022 at 0800 Patient Yes Yes   Sig: Take 1,000 Units by mouth every day.   atorvastatin (LIPITOR) 10 MG Tab 3/28/2022 at 0800 Patient No No   Sig: Take 1 Tablet by mouth every evening.   calcium citrate (CALCITRATE) 950 (200 Ca) MG Tab 3/28/2022 at 0800 Patient Yes Yes   Sig: Take 950 mg by mouth every day.   lansoprazole (PREVACID) 30 MG CAPSULE DELAYED RELEASE 3/28/2022 at 0800 Patient No No   Sig: Take 1 Capsule by mouth every morning. Indications: Gastroesophageal Reflux Disease   levothyroxine (SYNTHROID) 137 MCG Tab 3/28/2022 at 0400 Patient No No   Sig: Take 1 Tablet by mouth every morning on an empty stomach.      Facility-Administered Medications: None       Physical Exam  Temp:  [36.8 °C (98.3 °F)] 36.8 °C (98.3 °F)  Pulse:  [64-87] 77  Resp:  [14-20] 20  BP: (113-132)/(61-68) 117/63  SpO2:  [95 %-97 %] 95 %  Blood Pressure : 117/63   Temperature: 36.8 °C (98.3 °F)   Pulse: 77   Respiration: 20   Pulse Oximetry: 95 %       Physical Exam  Vitals reviewed.   Constitutional:       General: He is not in acute distress.     Appearance: Normal appearance. He is well-developed. He is not diaphoretic.   HENT:      Head: Normocephalic and atraumatic.   Eyes:      Conjunctiva/sclera: Conjunctivae normal.   Neck:      Vascular: No JVD.   Cardiovascular:      Rate and Rhythm: Normal rate.      Heart sounds: No murmur heard.    No gallop.   Pulmonary:      Effort: Pulmonary effort is normal. No respiratory distress.      Breath sounds: No stridor. No wheezing or rales.   Abdominal:      Palpations: Abdomen is soft.      Tenderness: There is no abdominal tenderness. There is no guarding or rebound.      Comments: Abdomen is soft nontender no guarding or rebound.  There is an ostomy noted in the right lower quadrant.  The tissue is warm and pink  with normal output.  There is a small defect inferiorly with some feculent material draining.  Surrounding skin is normal   Musculoskeletal:         General: No tenderness.      Right lower leg: No edema.      Left lower leg: No edema.   Skin:     General: Skin is warm and dry.      Findings: No rash.   Neurological:      Mental Status: He is alert and oriented to person, place, and time.   Psychiatric:         Thought Content: Thought content normal.         Laboratory:  Recent Labs     03/28/22  1306   WBC 7.1   RBC 4.88   HEMOGLOBIN 15.6   HEMATOCRIT 47.7   MCV 97.7   MCH 32.0   MCHC 32.7*   RDW 46.9   PLATELETCT 236   MPV 9.1     Recent Labs     03/28/22  1306   SODIUM 141   POTASSIUM 3.9   CHLORIDE 105   CO2 27   GLUCOSE 89   BUN 9   CREATININE 0.85   CALCIUM 9.4     Recent Labs     03/28/22  1306   ALTSGPT 19   ASTSGOT 21   ALKPHOSPHAT 96   TBILIRUBIN 1.0   GLUCOSE 89         No results for input(s): NTPROBNP in the last 72 hours.      No results for input(s): TROPONINT in the last 72 hours.    Imaging:  CT-ABDOMEN-PELVIS WITH    (Results Pending)   IR-PICC LINE PLACEMENT W/ GUIDANCE > AGE 5    (Results Pending)       X-Ray:  I have personally reviewed the images and compared with prior images.    Assessment/Plan:  I anticipate this patient will require at least two midnights for appropriate medical management, necessitating inpatient admission.    * Wound dehiscence, surgical, sequela- (present on admission)  Assessment & Plan  Surgeons from [facility] feel that there are several stitches that have caused a small dehiscence.  They recommend TPN and follow-up with them as an outpatient.  Patient will be admitted, for PICC placement and initiation of TPN  We will follow up with the patient's surgeon Dr. Rogers tomorrow when she is back in the office    Ulcerative colitis (HCC)- (present on admission)  Assessment & Plan  S/p total proctocolectomy at [facility]    Acquired hypothyroidism  Assessment & Plan  Continue  replacement      VTE prophylaxis: enoxaparin ppx

## 2022-03-28 NOTE — WOUND TEAM
"[facility]   Inpatient Services   Established Ostomy Management/ troubleshooting  HPI: Reviewed  PMH: Reviewed   SH: Reviewed   Reason for Ostomy nurse consult:  Denuded tissue and leakage     Ileostomy 12/28/21 Standard (Candy, anahi) RLQ (Active)   Stomal Appliance Assessment Clean;Dry;Intact 03/28/22 1600   Stoma Assessment Beefy red 03/28/22 1600   Stoma Shape Round;Budded Greater Than One Inch 03/28/22 1600   Stoma Size (in) 2 03/28/22 1600   Peristomal Assessment Denuded 03/28/22 1600   Mucocutaneous Junction  03/28/22 1600   Treatment Appliance Changed;Bag Change;Cleansed with water/washcloth;Crusted with stoma powder;Site care 03/28/22 1600   Peristomal Protectant No Sting Skin Prep;Paste Ring 03/28/22 1600   Stomal Appliance 2 3/4\" (70mm) CTF;Paste Ring, 2\" 03/28/22 1600   Output Color Brown 03/28/22 1600   WOUND RN ONLY - Stomal Appliance  2 Piece;Convex Barrier Ring, Oval, [id number];Paste Ring, Ceraplus, 2\" slim;2 3/4\" (70mm) CTF;Transparent Pouch Lock & Roll 03/28/22 1600   Appliance (Pouch) # [id number] 03/28/22 1600   Appliance Brand [place] 03/28/22 1600   Appliance Supplier [employer] 03/28/22 1600   Secure Start completed Declined 03/28/22 1600   Ostomy Care Resources Provided [facility] Tip Sheet 03/28/22 1600   WOUND NURSE ONLY - Time Spent with Patient (mins) 60 03/28/22 1600       Ostomy Appliance (type and size): 2 3/4 two piece with 1/2 30x38 oval convex to distal portion of the stoma and 1/2 paste ring stretched thin to barrier.    Assessment: Small opening at the base of the stoma, looks to be possible separation in the MJ or opening on stoma unsure.    Interventions:  Appliance removed and cleansed with warm washcloth and patted dry.  Crusted with stoma powder and no sting skin prep x2.  Traced and cut to fit ileostomy, 1/2 paste ring stretched thin and applied to barrier.  1/2 of oval convex ring to the base of the stoma just below the opening.  Applied the barrier over the convex " "ring.  Attached the bag and closed end of pouch.  Had patient heat up the bag with hands and running along the inside track of the barrier. Educated patient regarding crusting for \"hot spots\".      Pt education: Questions and concerns addressed.  Patient to follow-up with [facility] surgeon.      Plan: Ostomy nurses to continue to follow for ostomy needs and teaching PRN.    Anticipated discharge needs:Supplies, supplier information, possible HH or outpatient ostomy clinic.      "

## 2022-03-28 NOTE — ED PROVIDER NOTES
"ED Provider Note    CHIEF COMPLAINT  Chief Complaint   Patient presents with   • Other     Pt has a stoma on RLQ abdomen. Stoma keeps leaking \"from a hole located below the stoma\". Leakage started 2 days ago.       HPI  Arsen Stroud is a 67 y.o. male who presents with chief complaint of issue at his chronic stoma.  Patient has a history of ulcerative colitis and had a diversion approximately a year ago.  Patient had recent hernia surgery, on the 10th of this month where patient's hernia was sewed into place .  All patient surgeries including his diversion occurred at [facility].  Patient reports that since the hernia surgery he has had some leakage around his stoma.  Patient reports that he noticed there is a small hole below his stoma that appears to be leaking stool.    REVIEW OF SYSTEMS  ROS  See HPI for further details. All other systems are negative.     PAST MEDICAL HISTORY   has a past medical history of Acute pulmonary embolism without acute cor pulmonale (HCC) (6/11/2020), Arrhythmia, Arthritis, Chronic anticoagulation (6/22/2020), GIB (gastrointestinal bleeding) (6/22/2020), Hiatus hernia syndrome, High cholesterol, Hypothyroid, Palpitations (12/16/2019), Pulmonary emboli (HCC), Sleep apnea, Snoring, Tear of medial cartilage or meniscus of knee, current (2/12/2015), and Ulcerative colitis ([facility]).    SOCIAL HISTORY  Social History     Tobacco Use   • Smoking status: Never Smoker   • Smokeless tobacco: Never Used   Vaping Use   • Vaping Use: Never used   Substance and Sexual Activity   • Alcohol use: No   • Drug use: No   • Sexual activity: Not on file       SURGICAL HISTORY   has a past surgical history that includes pin insertion (5/4/2012); nerve repair (5/4/2012); artery exploration or repair (5/4/2012); ligament repair (5/4/2012); flap graft (6/20/2012); full thickness skin graft (6/20/2012); knee arthroplasty total (2013); knee arthroscopy (2/12/2015); and meniscectomy, knee, medial " (2/12/2015).    CURRENT MEDICATIONS  Home Medications     Reviewed by Wolfgang Lemus R.N. (Registered Nurse) on 03/28/22 at 0933  Med List Status: Partial   Medication Last Dose Status   atorvastatin (LIPITOR) 10 MG Tab  Active   CALCIUM PO  Active   Cholecalciferol (VITAMIN D3) 2000 UNIT Cap  Active   lansoprazole (PREVACID) 30 MG CAPSULE DELAYED RELEASE  Active   levothyroxine (SYNTHROID) 137 MCG Tab  Active   Omega-3 Fatty Acids (FISH OIL) 1000 MG CAPS capsule  Active                ALLERGIES  Allergies   Allergen Reactions   • Pcn [Penicillins] Rash     Tolerated rocephin 6/2020   • Sulfa Drugs Unspecified, Rash and Swelling     Mouth sores, swelling tongue, loss of taste  Mouth sores, swelling tongue, loss of taste       PHYSICAL EXAM  Vitals:    03/28/22 1046   BP: 123/61   Pulse: 66   Resp: 20   Temp:    SpO2: 96%       Physical Exam  Constitutional:       Appearance: He is well-developed.   Eyes:      Conjunctiva/sclera: Conjunctivae normal.   Pulmonary:      Effort: Pulmonary effort is normal.   Abdominal:      General: Abdomen is flat. Bowel sounds are normal. There is no distension.      Palpations: Abdomen is soft. There is no mass.      Comments: Stoma bag is full of liquid brown stool.  No associated blood.  Once the valve was removed the stoma appears healthy and viable, pink, it has circumferential sutures however on the inferior portion there is an associated dehiscence.  No considerable surrounding erythema or induration.   Musculoskeletal:      Cervical back: Normal range of motion and neck supple.   Skin:     General: Skin is warm.   Neurological:      Mental Status: He is alert and oriented to person, place, and time.   Psychiatric:         Behavior: Behavior normal.       Results for orders placed or performed during the hospital encounter of 03/28/22   CBC WITH DIFFERENTIAL   Result Value Ref Range    WBC 7.1 4.8 - 10.8 K/uL    RBC 4.88 4.70 - 6.10 M/uL    Hemoglobin 15.6 14.0 - 18.0 g/dL     Hematocrit 47.7 42.0 - 52.0 %    MCV 97.7 81.4 - 97.8 fL    MCH 32.0 27.0 - 33.0 pg    MCHC 32.7 (L) 33.7 - 35.3 g/dL    RDW 46.9 35.9 - 50.0 fL    Platelet Count 236 164 - 446 K/uL    MPV 9.1 9.0 - 12.9 fL    Neutrophils-Polys 71.80 44.00 - 72.00 %    Lymphocytes 9.70 (L) 22.00 - 41.00 %    Monocytes 6.90 0.00 - 13.40 %    Eosinophils 9.90 (H) 0.00 - 6.90 %    Basophils 1.30 0.00 - 1.80 %    Immature Granulocytes 0.40 0.00 - 0.90 %    Nucleated RBC 0.00 /100 WBC    Neutrophils (Absolute) 5.10 1.82 - 7.42 K/uL    Lymphs (Absolute) 0.69 (L) 1.00 - 4.80 K/uL    Monos (Absolute) 0.49 0.00 - 0.85 K/uL    Eos (Absolute) 0.70 (H) 0.00 - 0.51 K/uL    Baso (Absolute) 0.09 0.00 - 0.12 K/uL    Immature Granulocytes (abs) 0.03 0.00 - 0.11 K/uL    NRBC (Absolute) 0.00 K/uL   CMP   Result Value Ref Range    Sodium 141 135 - 145 mmol/L    Potassium 3.9 3.6 - 5.5 mmol/L    Chloride 105 96 - 112 mmol/L    Co2 27 20 - 33 mmol/L    Anion Gap 9.0 7.0 - 16.0    Glucose 89 65 - 99 mg/dL    Bun 9 8 - 22 mg/dL    Creatinine 0.85 0.50 - 1.40 mg/dL    Calcium 9.4 8.5 - 10.5 mg/dL    AST(SGOT) 21 12 - 45 U/L    ALT(SGPT) 19 2 - 50 U/L    Alkaline Phosphatase 96 30 - 99 U/L    Total Bilirubin 1.0 0.1 - 1.5 mg/dL    Albumin 3.9 3.2 - 4.9 g/dL    Total Protein 7.0 6.0 - 8.2 g/dL    Globulin 3.1 1.9 - 3.5 g/dL    A-G Ratio 1.3 g/dL   ESTIMATED GFR   Result Value Ref Range    GFR (CKD-EPI) 96 >60 mL/min/1.73 m 2     CT-ABDOMEN-PELVIS WITH    (Results Pending)   IR-PICC LINE PLACEMENT W/ GUIDANCE > AGE 5    (Results Pending)         COURSE & MEDICAL DECISION MAKING  Pertinent Labs & Imaging studies reviewed. (See chart for details)    Patient with dehiscence of his ostomy.  Is unclear if there is associated fistula though this would be very early for this to occur.  Patient reports that stool is coming out from the stoma.  I discussed the case with general surgery who recommended us to discuss the case with wound care.  I discussed the case with  wound care who believes patient would likely benefit from admission for TPN, possible PICC placement and to allow the stoma to heal.  I discussed the case with  [facility] on-call colorectal surgeon Dr. Belle who recommends a similar approach to wound care.  They also recommend a CT with oral and IV contrast which I have ordered.  Basic labs for patient are reassuring.  I discussed the case with hospitalist who is agreed to admit.  Hospitalist is coordinating with patient to try to get hold of patient's actual surgeon, I attempted to page but I am only getting the on-call physician.     The patient will return for worsening symptoms and is stable at the time of discharge. The patient verbalizes understanding and will comply.    FINAL IMPRESSION  1.  Ileostomy dehiscence, ileostomy malfunction, wound dehiscence         Electronically signed by: Wolfgang Carlin M.D., 3/28/2022 11:03 AM

## 2022-03-28 NOTE — ASSESSMENT & PLAN NOTE
Surgeons from [facility] feel that there are several stitches that have caused a small dehiscence.    PICC line placed however unclear if pt actually needs TPN  No longer leaking after wound team adjusted bag  Page out to Dr. Rogers  Hold off on TPN for now

## 2022-03-28 NOTE — ED NOTES
Med rec updated and complete. Allergies reviewed. Confirmed name and date of birth  No antibiotic use in last 30 days        Current home pharmacy [facility] [phone]          Pt is from [facility]  And does not have a pharmacy in [place]

## 2022-03-28 NOTE — ED TRIAGE NOTES
"Chief Complaint   Patient presents with   • Other     Pt has a stoma on RLQ abdomen. Stoma keeps leaking \"from a hole located below the stoma\". Leakage started 2 days ago.     /68   Pulse 87   Temp 36.8 °C (98.3 °F) (Temporal)   Resp 16   Wt 85 kg (187 lb 6.3 oz)   SpO2 96%   BMI 24.06 kg/m²     "

## 2022-03-29 ENCOUNTER — APPOINTMENT (OUTPATIENT)
Dept: RADIOLOGY | Facility: MEDICAL CENTER | Age: 67
End: 2022-03-29
Attending: HOSPITALIST
Payer: MEDICARE

## 2022-03-29 LAB
ANION GAP SERPL CALC-SCNC: 10 MMOL/L (ref 7–16)
BUN SERPL-MCNC: 8 MG/DL (ref 8–22)
CALCIUM SERPL-MCNC: 8.9 MG/DL (ref 8.5–10.5)
CHLORIDE SERPL-SCNC: 105 MMOL/L (ref 96–112)
CO2 SERPL-SCNC: 24 MMOL/L (ref 20–33)
CREAT SERPL-MCNC: 0.95 MG/DL (ref 0.5–1.4)
GFR SERPLBLD CREATININE-BSD FMLA CKD-EPI: 89 ML/MIN/1.73 M 2
GLUCOSE SERPL-MCNC: 89 MG/DL (ref 65–99)
POTASSIUM SERPL-SCNC: 3.8 MMOL/L (ref 3.6–5.5)
SODIUM SERPL-SCNC: 139 MMOL/L (ref 135–145)

## 2022-03-29 PROCEDURE — A9270 NON-COVERED ITEM OR SERVICE: HCPCS | Performed by: HOSPITALIST

## 2022-03-29 PROCEDURE — C1751 CATH, INF, PER/CENT/MIDLINE: HCPCS

## 2022-03-29 PROCEDURE — 700102 HCHG RX REV CODE 250 W/ 637 OVERRIDE(OP): Performed by: HOSPITALIST

## 2022-03-29 PROCEDURE — 99231 SBSQ HOSP IP/OBS SF/LOW 25: CPT | Performed by: SURGERY

## 2022-03-29 PROCEDURE — 80048 BASIC METABOLIC PNL TOTAL CA: CPT

## 2022-03-29 PROCEDURE — 700111 HCHG RX REV CODE 636 W/ 250 OVERRIDE (IP): Performed by: HOSPITALIST

## 2022-03-29 PROCEDURE — 96372 THER/PROPH/DIAG INJ SC/IM: CPT

## 2022-03-29 PROCEDURE — 700105 HCHG RX REV CODE 258: Performed by: HOSPITALIST

## 2022-03-29 PROCEDURE — G0378 HOSPITAL OBSERVATION PER HR: HCPCS

## 2022-03-29 PROCEDURE — 99226 PR SUBSEQUENT OBSERVATION CARE,LEVEL III: CPT | Performed by: HOSPITALIST

## 2022-03-29 RX ADMIN — SODIUM CHLORIDE, POTASSIUM CHLORIDE, SODIUM LACTATE AND CALCIUM CHLORIDE: 600; 310; 30; 20 INJECTION, SOLUTION INTRAVENOUS at 02:40

## 2022-03-29 RX ADMIN — SODIUM CHLORIDE, POTASSIUM CHLORIDE, SODIUM LACTATE AND CALCIUM CHLORIDE: 600; 310; 30; 20 INJECTION, SOLUTION INTRAVENOUS at 15:07

## 2022-03-29 RX ADMIN — SODIUM CHLORIDE, POTASSIUM CHLORIDE, SODIUM LACTATE AND CALCIUM CHLORIDE: 600; 310; 30; 20 INJECTION, SOLUTION INTRAVENOUS at 23:19

## 2022-03-29 RX ADMIN — ATORVASTATIN CALCIUM 10 MG: 10 TABLET, FILM COATED ORAL at 17:54

## 2022-03-29 RX ADMIN — ACETAMINOPHEN 650 MG: 325 TABLET, FILM COATED ORAL at 02:43

## 2022-03-29 RX ADMIN — ENOXAPARIN SODIUM 40 MG: 40 INJECTION SUBCUTANEOUS at 05:27

## 2022-03-29 RX ADMIN — OMEPRAZOLE 20 MG: 20 CAPSULE, DELAYED RELEASE ORAL at 05:27

## 2022-03-29 RX ADMIN — LEVOTHYROXINE SODIUM 137 MCG: 137 TABLET ORAL at 05:27

## 2022-03-29 ASSESSMENT — PAIN DESCRIPTION - PAIN TYPE: TYPE: ACUTE PAIN

## 2022-03-29 ASSESSMENT — COPD QUESTIONNAIRES
DO YOU EVER COUGH UP ANY MUCUS OR PHLEGM?: YES, A FEW DAYS A WEEK OR MONTH
COPD SCREENING SCORE: 3
DURING THE PAST 4 WEEKS HOW MUCH DID YOU FEEL SHORT OF BREATH: NONE/LITTLE OF THE TIME
HAVE YOU SMOKED AT LEAST 100 CIGARETTES IN YOUR ENTIRE LIFE: NO/DON'T KNOW

## 2022-03-29 ASSESSMENT — ENCOUNTER SYMPTOMS
NAUSEA: 0
COUGH: 0
CHILLS: 0
DIZZINESS: 0
PALPITATIONS: 0
SHORTNESS OF BREATH: 0
ABDOMINAL PAIN: 0
HEADACHES: 0
FEVER: 0
VOMITING: 0

## 2022-03-29 NOTE — DISCHARGE PLANNING
Anticipated Discharge Disposition: Pending on progression        Action: CM was informed by nurse that patient's spouse provided physical home address of [address] [place] [address] should the patient need home health.        Barriers to Discharge: medical clearance, DC plan        Plan: Pending on progression, CM will cont to f/u for DC Needs.

## 2022-03-29 NOTE — PROGRESS NOTES
Hospital Medicine Daily Progress Note    Date of Service  3/29/2022    Chief Complaint  Arsen Stroud is a 67 y.o. male admitted 3/28/2022 with hole below stoma    Hospital Course  67-year-old male with past medical history of ulcerative colitis s/p total proctocolectomy with end ileostomy, hypothyroidism, PE off AC presenting with abnormal discharge from his ostomy.      Interval Problem Update  PICC line placed.  Upon discussion with Dr. Gabriel and reviewing ERP's note it is unclear if TPN was truly recommended from the on-call surgeon at [facility].  I have placed a call out to Dr. Rogers patient's primary surgeon for recommendations  After wound care patient's ostomy is no longer leaking.  He denies any abdominal pain  He has not eaten anything since yesterday morning    I have personally seen and examined the patient at bedside. I discussed the plan of care with patient, family and bedside RN.    Consultants/Specialty  general surgery    Code Status  Full Code    Disposition  Patient is not medically cleared for discharge.   Anticipate discharge to to home with close outpatient follow-up.  I have placed the appropriate orders for post-discharge needs.    Review of Systems  Review of Systems   Constitutional: Negative for chills and fever.   Respiratory: Negative for cough and shortness of breath.    Cardiovascular: Negative for chest pain and palpitations.   Gastrointestinal: Negative for abdominal pain, nausea and vomiting.   Genitourinary: Negative for dysuria and urgency.   Neurological: Negative for dizziness and headaches.   All other systems reviewed and are negative.       Physical Exam  Temp:  [36.7 °C (98.1 °F)-38.4 °C (101.2 °F)] 36.8 °C (98.3 °F)  Pulse:  [69-97] 73  Resp:  [12-17] 17  BP: ()/(49-72) 99/56  SpO2:  [92 %-97 %] 95 %    Physical Exam  Vitals and nursing note reviewed.   Constitutional:       Appearance: Normal appearance.   Cardiovascular:      Rate and Rhythm: Normal rate  and regular rhythm.      Heart sounds: No murmur heard.  Pulmonary:      Effort: Pulmonary effort is normal. No respiratory distress.      Breath sounds: Normal breath sounds.   Abdominal:      General: Abdomen is flat. There is no distension.      Palpations: Abdomen is soft.      Tenderness: There is no abdominal tenderness.      Comments: RLQ ostomy +   Neurological:      General: No focal deficit present.      Mental Status: He is alert and oriented to person, place, and time.         Fluids  No intake or output data in the 24 hours ending 03/29/22 1403    Laboratory  Recent Labs     03/28/22  1306   WBC 7.1   RBC 4.88   HEMOGLOBIN 15.6   HEMATOCRIT 47.7   MCV 97.7   MCH 32.0   MCHC 32.7*   RDW 46.9   PLATELETCT 236   MPV 9.1     Recent Labs     03/28/22  1306 03/29/22  0240   SODIUM 141 139   POTASSIUM 3.9 3.8   CHLORIDE 105 105   CO2 27 24   GLUCOSE 89 89   BUN 9 8   CREATININE 0.85 0.95   CALCIUM 9.4 8.9                   Imaging  CT-ABDOMEN-PELVIS WITH   Final Result      1.  There are postoperative changes consistent with total proctocolectomy and a right lower quadrant end ileostomy. There is no associated subcutaneous fluid collection and there is no intra-abdominal or pelvic ascites.   2.  There is a questionable tiny skin defect inferior to the ostomy but there is no surrounding fluid collection.      IR-PICC LINE PLACEMENT W/ GUIDANCE > AGE 5    (Results Pending)        Assessment/Plan  * Wound dehiscence, surgical, sequela- (present on admission)  Assessment & Plan  Surgeons from [facility] feel that there are several stitches that have caused a small dehiscence.    PICC line placed however unclear if pt actually needs TPN  No longer leaking after wound team adjusted bag  Page out to Dr. Rogers  Hold off on TPN for now    Ulcerative colitis (HCC)- (present on admission)  Assessment & Plan  S/p total proctocolectomy at [facility]    Acquired hypothyroidism  Assessment & Plan  Continue replacement       VTE  prophylaxis: enoxaparin ppx    I have performed a physical exam and reviewed and updated ROS and Plan today (3/29/2022). In review of yesterday's note (3/28/2022), there are no changes except as documented above.

## 2022-03-29 NOTE — HOSPITAL COURSE
67-year-old male with past medical history of ulcerative colitis s/p total proctocolectomy with end ileostomy, hypothyroidism, PE off AC presenting with abnormal discharge from his ostomy.

## 2022-03-29 NOTE — CARE PLAN
The patient is Stable - Low risk of patient condition declining or worsening    Shift Goals: Mobility, Monitor Ostomy  Clinical Goals: Mobility, Monitor Ostomy  Patient Goals: Sleep  Family Goals: TIESHA    Progress made toward(s) clinical / shift goals:  Up to bathroom. Pt cares for his ostomy, appliance checked, supplies in room. Sleep throughout shift.     Patient is not progressing towards the following goals: N/A      Problem: Knowledge Deficit - Standard  Goal: Patient and family/care givers will demonstrate understanding of plan of care, disease process/condition, diagnostic tests and medications  Outcome: Progressing  Plan of care reviewed.

## 2022-03-29 NOTE — CARE PLAN

## 2022-03-29 NOTE — PROGRESS NOTES
4 Eyes Skin Assessment Completed by Sade RN and SUZY Mayen.    Head WDL  Ears WDL  Nose WDL  Mouth WDL  Neck WDL  Breast/Chest Incision  Shoulder Blades WDL  Spine WDL  (R) Arm/Elbow/Hand WDL  (L) Arm/Elbow/Hand WDL  Abdomen ileostomy, wound dehiscense  Groin WDL  Scrotum/Coccyx/Buttocks WDL  (R) Leg WDL  (L) Leg WDL  (R) Heel/Foot/Toe WDL  (L) Heel/Foot/Toe WDL          Devices In Places    Interventions In Place N/A    Possible Skin Injury No    Pictures Uploaded Into Epic N/A, patient alreasy seen by wound care  Wound Consult Placed N/A  RN Wound Prevention Protocol Ordered No

## 2022-03-29 NOTE — PROCEDURES
Vascular Access Team  Patient did not have vasculature to support a double lumen PICC, spoke to MD and decision was made to go ahead with single lumen PICC.     Date of Insertion: 3/29/2022  Arm Circumference: 32  Internal length: 40  External Length: 0 hub  Vein Occupancy %: 38   Reason for PICC: tpn  Labs: WBC 7.1, , BUN 8, Cr 0.95, GFR 89, INR n/a     Consents confirmed, vessel patency confirmed with ultrasound. Risks and benefits of procedure explained to patient and education regarding central line associated bloodstream infections provided. Questions answered.      PICC placed in RUE per licensed provider order with ultrasound guidance.  4 Fr, 1 lumen PICC placed in brachial vein after 1 attempt(s). 2 mL of 1% lidocaine injected intradermally at the insertion site. A 21 gauge microintroducer needle was visualized entering the vein and modified Seldinger technique was used to obtain access to the vein. 40 cm catheter inserted and brisk blood return was observed from each lumen upon aspiration. Line secured at the 0 hub cm marker. TCS stylet removed and observed to be fully intact. Each lumen flushed using pulsatile method without resistance with 10 mL 0.9% normal saline. PICC line secured with Biopatch and Tegaderm.     PICC tip placement location is confirmed by nurse to be in the Superior Vena Cava (SVC) utilizing 3CG technology. PICC line is appropriate for use at this time. Patient tolerated procedure well, without complications.  Patient condition relayed to primary RN or ordering physician via this post procedure note in the EMR.      Ultrasound images uploaded to PACS and viewable in the EMR - yes  Ultrasound imaged printed and placed in paper chart - no     BARD Power PICC ref # [id number], Lot # [id number], Expiration Date 04/30/2023

## 2022-03-29 NOTE — PROGRESS NOTES
Paged at 1937. Spoke to Dr. Tyrell Franco at 1940. Pt reporting allergy symptoms and runny nose. MD placed order for Flonase. Clarified NPO order, MD modified to sips with medications.

## 2022-03-29 NOTE — PROGRESS NOTES
67-year-old male with history of ulcerative colitis status post proctocolectomy/ileostomy with ileostomy revision less than 2 weeks ago at [place].  Patient appears to have pulled stitch at his ileostomy revision with leakage below the skin level causing partial dehiscence of his ileostomy from the skin.  CT scan shows no abscess in the area appears to be draining well without intra-abdominal contamination.    Do not think patient requires urgent surgical intervention for this.  Appreciate wound care assessment yesterday: Follow the recommendations for now    I am uncertain where the plan for TPN came from in this case.  I do not think it is necessary but would defer to patient's primary surgeon, who should be contacted regarding of the developments.    Discussed with hospitalist, wound care and ERP.

## 2022-03-30 VITALS
TEMPERATURE: 97.3 F | DIASTOLIC BLOOD PRESSURE: 51 MMHG | SYSTOLIC BLOOD PRESSURE: 100 MMHG | HEART RATE: 63 BPM | OXYGEN SATURATION: 95 % | WEIGHT: 187.39 LBS | RESPIRATION RATE: 16 BRPM | BODY MASS INDEX: 24.06 KG/M2

## 2022-03-30 PROCEDURE — 99217 PR OBSERVATION CARE DISCHARGE: CPT | Performed by: HOSPITALIST

## 2022-03-30 PROCEDURE — 700102 HCHG RX REV CODE 250 W/ 637 OVERRIDE(OP): Performed by: HOSPITALIST

## 2022-03-30 PROCEDURE — A9270 NON-COVERED ITEM OR SERVICE: HCPCS | Performed by: HOSPITALIST

## 2022-03-30 PROCEDURE — G0378 HOSPITAL OBSERVATION PER HR: HCPCS

## 2022-03-30 PROCEDURE — 700111 HCHG RX REV CODE 636 W/ 250 OVERRIDE (IP): Performed by: HOSPITALIST

## 2022-03-30 PROCEDURE — 96372 THER/PROPH/DIAG INJ SC/IM: CPT

## 2022-03-30 RX ADMIN — OMEPRAZOLE 20 MG: 20 CAPSULE, DELAYED RELEASE ORAL at 04:42

## 2022-03-30 RX ADMIN — LEVOTHYROXINE SODIUM 137 MCG: 137 TABLET ORAL at 04:42

## 2022-03-30 RX ADMIN — ENOXAPARIN SODIUM 40 MG: 40 INJECTION SUBCUTANEOUS at 04:42

## 2022-03-30 NOTE — DISCHARGE INSTRUCTIONS
Discharge Instructions    Discharged to home by car with self. Discharged via wheelchair, hospital escort: Yes.  Special equipment needed: Not Applicable    Be sure to schedule a follow-up appointment with your primary care doctor or any specialists as instructed.     Discharge Plan:   Influenza Vaccine Indication: Indicated: 65 years and older  Influenza Vaccine Given - only chart on this line when given: Influenza Vaccine Given (See MAR)    I understand that a diet low in cholesterol, fat, and sodium is recommended for good health. Unless I have been given specific instructions below for another diet, I accept this instruction as my diet prescription.   Other diet: regular    Special Instructions:   Follow up with MD    · Is patient discharged on Warfarin / Coumadin?   No     Depression / Suicide Risk    As [facility], it is important to learn how to keep safe from harming yourself.    Recognize the warning signs:  · Abrupt changes in personality, positive or negative- including increase in energy   · Giving away possessions  · Change in eating patterns- significant weight changes-  positive or negative  · Change in sleeping patterns- unable to sleep or sleeping all the time   · Unwillingness or inability to communicate  · Depression  · Unusual sadness, discouragement and loneliness  · Talk of wanting to die  · Neglect of personal appearance   · Rebelliousness- reckless behavior  · Withdrawal from people/activities they love  · Confusion- inability to concentrate     If you or a loved one observes any of these behaviors or has concerns about self-harm, here's what you can do:  · Talk about it- your feelings and reasons for harming yourself  · Remove any means that you might use to hurt yourself (examples: pills, rope, extension cords, firearm)  · Get professional help from the community (Mental Health, Substance Abuse, psychological counseling)  · Do not be alone:Call your  Safe Contact- someone whom you trust who will be there for you.  · Call your local CRISIS HOTLINE 184-8391 or 440-935-9390  · Call your local Children's Mobile Crisis Response Team [place] (406) 897-2154 or www.[facility]  · Call the toll free National Suicide Prevention Hotlines   · National Suicide Prevention Lifeline 052-848-AKRS (5675)  · National [employer] Network 800-SUICIDE (086-1942)

## 2022-03-30 NOTE — PROGRESS NOTES
Pt OK for discharge from surgical POV.  No need for tpn/PICC    Pt's Surgeon has moved out of the area and he requires colorectal surgery follow up. Pt did see Dr Velez in the past, but will change to Dr Huynh for his surgical care.    Follow up hs been set up with Dr Huynh at [facility] in the next week regarding ileostomy concerns.

## 2022-03-30 NOTE — PROGRESS NOTES
DC instructions given and signed, concerns about a form for disputing charges addressed and was relayed to bedside RN and Manager as well.

## 2022-03-30 NOTE — CARE PLAN
Orientation: Alert and oriented times four.   HNT: WDL. Takes pills whole  Respiratory: Lung sounds clear, even and unlabored.   O2: Room air.   Cardiac: S1S2 present  GI: Audible and active bowel sounds. Ileostomy present with output. Clean dry and intact. Pt reports he has had this for one year and does his own self care.   : Voiding and continent.   IV: PIV clean dry and intact. Flushed PICC drawing brisk blood return. Removed by Instructor and student   Skin: WDL  VS: Stable.   Patient safety: Hourly rounding completed. Call bell within reach and pt educated on assistance. Pt remained free from falls and injury.      12 hour cc completed.       The patient is Watcher - Medium risk of patient condition declining or worsening    Shift Goals  Clinical Goals: Mobility, Monitor Ostomy  Patient Goals: Comfort, Plan of care  Family Goals: TIESHA    Progress made toward(s) clinical / shift goals:  increase PO intake    Patient is not progressing towards the following goals:

## 2022-03-30 NOTE — CARE PLAN
The patient is Stable - Low risk of patient condition declining or worsening    Shift Goals: Mobility, Monitor Ostomy   Clinical Goals: Mobility, Monitor Ostomy  Patient Goals: Comfort, Plan of care  Family Goals: TIESHA    Progress made toward(s) clinical / shift goals:  Up to bathroom. Pt cares for his own ostomy. Appliance checked. Supplies in room. IV fluids per MAR. Education on order for regular diet, ice chips provided per request.     Patient is not progressing towards the following goals: N/A      Problem: Knowledge Deficit - Standard  Goal: Patient and family/care givers will demonstrate understanding of plan of care, disease process/condition, diagnostic tests and medications  Outcome: Progressing  Plan of care reviewed. Call light in reach. Educated to call for assistance.

## 2022-03-30 NOTE — DISCHARGE SUMMARY
"Discharge Summary    CHIEF COMPLAINT ON ADMISSION  Chief Complaint   Patient presents with   • Other     Pt has a stoma on RLQ abdomen. Stoma keeps leaking \"from a hole located below the stoma\". Leakage started 2 days ago.       Reason for Admission  Sent By M.D     Admission Date  3/28/2022    CODE STATUS  Full Code    HPI & HOSPITAL COURSE  67-year-old male with past medical history of ulcerative colitis s/p total proctocolectomy with end ileostomy, hypothyroidism, PE off AC presenting with abnormal discharge from his ostomy. Patient appears to have pulled stitch at his ileostomy revision with leakage below the skin level causing partial dehiscence of his ileostomy from the skin.  CT scan shows no abscess in the area appears to be draining well without intra-abdominal contamination. It was attempted to contact patient's primary surgeon in [facility] however she was unavailable and leaving the practice there. There seemed to have been someone who communicated requirement of a PICC Line and TPN however shortly after PICC was placed this was deemed not necessary. So patient will set up outpt follow up with colorectal surgeon Dr. Huynh.       Therefore, he is discharged in good and stable condition to home with close outpatient follow-up.      Discharge Date  3/30/22    FOLLOW UP ITEMS POST DISCHARGE  F/u with Dr. Huynh    DISCHARGE DIAGNOSES  Principal Problem:    Wound dehiscence, surgical, sequela POA: Yes  Active Problems:    Acquired hypothyroidism POA: Unknown    Ulcerative colitis (HCC) POA: Yes    Ileostomy status (HCC) POA: Yes  Resolved Problems:    * No resolved hospital problems. *      FOLLOW UP  No future appointments.  Josué Huynh M.D.  [address] [id number] [phone]              Schedule an appointment as soon as possible for a visit in 1 week  For wound re-check    Josué Huynh M.D.  75 [facility] [address]  [facility] [id number] [phone]    In 1 " week        MEDICATIONS ON DISCHARGE     Medication List      CONTINUE taking these medications      Instructions   atorvastatin 10 MG Tabs  Commonly known as: LIPITOR   Take 1 Tablet by mouth every evening.  Dose: 10 mg     calcium citrate 950 (200 Ca) MG Tabs  Commonly known as: CALCITRATE   Take 950 mg by mouth every day.  Dose: 950 mg     fish oil 1000 MG Caps capsule   Take 1,000 mg by mouth every morning.  Dose: 1,000 mg     lansoprazole 30 MG Cpdr  Commonly known as: PREVACID   Take 1 Capsule by mouth every morning. Indications: Gastroesophageal Reflux Disease  Dose: 30 mg     levothyroxine 137 MCG Tabs  Commonly known as: SYNTHROID   Take 1 Tablet by mouth every morning on an empty stomach.  Dose: 137 mcg     Vitamin D (Cholecalciferol) 25 MCG (1000 UT) Tabs  Commonly known as: cholecalciferol   Take 1,000 Units by mouth every day.  Dose: 1,000 Units            Allergies  Allergies   Allergen Reactions   • Sulfa Drugs Rash, Swelling and Unspecified     Mouth sores, swelling tongue, loss of taste  Mouth sores, swelling tongue, loss of taste   • Pcn [Penicillins] Rash     Tolerated rocephin 6/2020    As a young child       DIET  Orders Placed This Encounter   Procedures   • Diet Order Diet: Regular     Standing Status:   Standing     Number of Occurrences:   1     Order Specific Question:   Diet:     Answer:   Regular [1]       ACTIVITY  As tolerated.  Weight bearing as tolerated    CONSULTATIONS  General surgery     PROCEDURES  PICC placement    LABORATORY  Lab Results   Component Value Date    SODIUM 139 03/29/2022    POTASSIUM 3.8 03/29/2022    CHLORIDE 105 03/29/2022    CO2 24 03/29/2022    GLUCOSE 89 03/29/2022    BUN 8 03/29/2022    CREATININE 0.95 03/29/2022        Lab Results   Component Value Date    WBC 7.1 03/28/2022    HEMOGLOBIN 15.6 03/28/2022    HEMATOCRIT 47.7 03/28/2022    PLATELETCT 236 03/28/2022        Total time of the discharge process exceeds 33 minutes.

## 2022-11-04 NOTE — OP THERAPY DAILY TREATMENT
"  Outpatient Speech Therapy  DAILY TREATMENT     [facility] [address].  [address]  [place] [address]  Phone:  [phone]  Fax:  [phone]    Date: 08/21/2019    Patient: Mic Stroud  YOB: 1955  MRN: [id number]     Time Calculation  Start time: 1430  Stop time: 1523 Time Calculation (min): 53 minutes       Chief Complaint: Other (Voice \"it was better last week\" )    Visit #: 3    Subjective:   Reason for Therapy:     Reason For Evaluation:  Voice  Social Support:     Patient Mental Status:  Alert and Responsive  Progress Factors:     Progression:  Getting Better  Additional Subjective Comments:      Patient reports improvements in amount of time he is able to use effective voice, in addition to reporting, \"last weekend I was able to sing in my [employer] choir with enough confidence to where I could be heard.\"     Patient continues to take PPI for possible GERD.       Objective:   Treatments/Interventions Performed:  Patient/Caregiver education, Compensatory strategy training, Home program and Voice training    Short Term Goals:  1.  Patient will state and implement vocal hygiene program as outlined during speech therapy sessions to promote voice health and function, 88% accuracy independent.  CURRENT:  Patient independent in recalling hydration, replacement behaviors for throat clear (including yawn-sigh, glottal lewis), avoid screaming/ yelling and following GERD precautions.   PLAN:  Continue to reinforce during structured speech therapy sessions. Continue.     2. Patient will independently initiate replacement behaviors to throat clear 4/5 obligatory opportunities, and implement vocal fold relaxation techniques 80% accuracy independent.  CURRENT:  Patient independent in completion of yawn-sigh/ glottal lewis but required verbal reminders as part of vocal hygiene when experiencing throat clear during structured speech therapy session.  PLAN:  Progressing/ " Continue.     3. Patient will participate in structured exercise addressing vocal fold strengthening/ adduction completing with 85% accuracy, independent.   CURRENT: Patient reports that implementation of exercise to address vocal fold adduction has been helpful in eliminating throat clear and improved voicing.  Vocal fold strengthening exercises introduced related to voicing including:  Sustained phonation /ah/ /ee/ and pitch glides. Patient maintained voicing to 9 seconds with both /ah/ and /ee/ during today's session.  PLAN:  Assess vocal fold health given increase in vocal fold adduction exercises introduced during today's session at next scheduled ST visit.  Continue.       Speech Therapy Assessment:     Speech Mechanism Assessment:     Patient voice description: Clear  Speech mechanism comments: Diaphragmatic breathing reinforced prior to initiation of structured exercise addressing laryngeal strengthening. Sustained phonation /ah/ and /ee/ introduced to continue with exercise addressing laryngeal strength. Patient completed to independent level following education.       Speech Therapy Plan :   Prognosis & Recommendations  Impression Summary:  Mic Stroud, a 64 year old male, continued participation in direct, outpatient speech therapy addressing voice.     Patient demonstrating improved understanding with regards to vocal hygiene and vocal fold strengthening exercises completing to independent level as part of home program.     Patient required verbal cues to initiate replacement behaviors to throat clearing, which was noted throughout the speech therapy session.   Prognosis:  Good  Prognosis Details:  Expect continued improvement in voicing given continued support in vocal fold health and strengthening from outpatient ST.   Therapy Recommendations  Recommendation:  Individual Speech Therapy,  Planned Therapy Interventions:  Home Program, Patient/Caregiver Education, Compensatory Strategy Training and Voice  training,   Plan Details:  Continue with POC.                 Hemostasis: Electrocautery

## 2022-11-17 ENCOUNTER — OFFICE VISIT (OUTPATIENT)
Dept: MEDICAL GROUP | Facility: PHYSICIAN GROUP | Age: 67
End: 2022-11-17
Payer: MEDICARE

## 2022-11-17 VITALS
TEMPERATURE: 97.7 F | HEIGHT: 74 IN | HEART RATE: 70 BPM | DIASTOLIC BLOOD PRESSURE: 58 MMHG | OXYGEN SATURATION: 97 % | BODY MASS INDEX: 24.8 KG/M2 | WEIGHT: 193.25 LBS | SYSTOLIC BLOOD PRESSURE: 102 MMHG | RESPIRATION RATE: 20 BRPM

## 2022-11-17 DIAGNOSIS — E78.00 PURE HYPERCHOLESTEROLEMIA: ICD-10-CM

## 2022-11-17 DIAGNOSIS — Z13.1 ENCOUNTER FOR SCREENING FOR DIABETES MELLITUS: ICD-10-CM

## 2022-11-17 DIAGNOSIS — Z23 NEED FOR VACCINATION: ICD-10-CM

## 2022-11-17 DIAGNOSIS — K21.9 GASTROESOPHAGEAL REFLUX DISEASE WITHOUT ESOPHAGITIS: ICD-10-CM

## 2022-11-17 DIAGNOSIS — B07.9 WART OF HAND: ICD-10-CM

## 2022-11-17 DIAGNOSIS — E03.9 ACQUIRED HYPOTHYROIDISM: ICD-10-CM

## 2022-11-17 DIAGNOSIS — D53.9 MACROCYTIC ANEMIA: ICD-10-CM

## 2022-11-17 PROCEDURE — 90662 IIV NO PRSV INCREASED AG IM: CPT | Performed by: NURSE PRACTITIONER

## 2022-11-17 PROCEDURE — G0009 ADMIN PNEUMOCOCCAL VACCINE: HCPCS | Performed by: NURSE PRACTITIONER

## 2022-11-17 PROCEDURE — 90677 PCV20 VACCINE IM: CPT | Performed by: NURSE PRACTITIONER

## 2022-11-17 PROCEDURE — G0008 ADMIN INFLUENZA VIRUS VAC: HCPCS | Performed by: NURSE PRACTITIONER

## 2022-11-17 PROCEDURE — 17110 DESTRUCTION B9 LES UP TO 14: CPT | Performed by: NURSE PRACTITIONER

## 2022-11-17 PROCEDURE — 99214 OFFICE O/P EST MOD 30 MIN: CPT | Mod: 25 | Performed by: NURSE PRACTITIONER

## 2022-11-17 RX ORDER — ATORVASTATIN CALCIUM 10 MG/1
10 TABLET, FILM COATED ORAL EVERY EVENING
Qty: 90 TABLET | Refills: 3 | Status: SHIPPED | OUTPATIENT
Start: 2022-11-17 | End: 2023-11-06 | Stop reason: SDUPTHER

## 2022-11-17 RX ORDER — LANSOPRAZOLE 30 MG/1
30 CAPSULE, DELAYED RELEASE ORAL EVERY MORNING
Qty: 90 CAPSULE | Refills: 3 | Status: SHIPPED | OUTPATIENT
Start: 2022-11-17 | End: 2023-11-06 | Stop reason: SDUPTHER

## 2022-11-17 RX ORDER — LEVOTHYROXINE SODIUM 137 UG/1
137 TABLET ORAL
Qty: 90 TABLET | Refills: 3 | Status: SHIPPED | OUTPATIENT
Start: 2022-11-17 | End: 2023-11-06 | Stop reason: SDUPTHER

## 2022-11-17 ASSESSMENT — FIBROSIS 4 INDEX: FIB4 SCORE: 1.33

## 2022-11-17 NOTE — ASSESSMENT & PLAN NOTE
Acute and ongoing. He states that for the past month or so he has noticed a wart to the left palm of his hand. He is wondering if it can be removed in the office today.

## 2022-11-17 NOTE — ASSESSMENT & PLAN NOTE
"Chronic, stable.  Currently taking Atorvastatin 10 mg as directed.  Denies side effects of the medication--no myalgias or abdominal pain.  10-year cardiac risk ASCVD score: 6.1%  Reports diet is \"okay\".   He is following a low-cholesterol diet.  He is exercising regularly.  He is not taking ASA daily.  He denies dizziness, claudication, or chest pain.  Due for updated lab work.     02/07/22 06:49   Cholesterol,Tot 130   Triglycerides 75   HDL 52   LDL 63     "

## 2022-11-17 NOTE — ASSESSMENT & PLAN NOTE
Chronic and ongoing. Currently taking Lansoprazole 30 mg. He states that it does help with his symptoms. He takes it every other day and that seems to be managing things well. Requesting a refill today.

## 2022-11-17 NOTE — PROGRESS NOTES
"Subjective  Chief Complaint  Medication Refill and Wart    History of Present Illness  Arsen Stroud is a 67 y.o. male. This established patient is here today to refill his medications and discuss a wart.    Wart of hand  Acute and ongoing. He states that for the past month or so he has noticed a wart to the left palm of his hand. He is wondering if it can be removed in the office today.    Gastroesophageal reflux disease without esophagitis  Chronic and ongoing. Currently taking Lansoprazole 30 mg. He states that it does help with his symptoms. He takes it every other day and that seems to be managing things well. Requesting a refill today.    Pure hypercholesterolemia  Chronic, stable.  Currently taking Atorvastatin 10 mg as directed.  Denies side effects of the medication--no myalgias or abdominal pain.  10-year cardiac risk ASCVD score: 6.1%  Reports diet is \"okay\".   He is following a low-cholesterol diet.  He is exercising regularly.  He is not taking ASA daily.  He denies dizziness, claudication, or chest pain.  Due for updated lab work.     02/07/22 06:49   Cholesterol,Tot 130   Triglycerides 75   HDL 52   LDL 63     Past Medical History    Allergies: Sulfa drugs and Pcn [penicillins]  Past Medical History:   Diagnosis Date    Acute pulmonary embolism without acute cor pulmonale (HCC) 6/11/2020    Arrhythmia     reports occasional \"Fluttering\", states work up neg.     Arthritis     knees    Chronic anticoagulation 6/22/2020    GIB (gastrointestinal bleeding) 6/22/2020    Hiatus hernia syndrome     High cholesterol     Hypothyroid     Palpitations 12/16/2019    Pulmonary emboli (HCC)     Sleep apnea     had a sleep study. doesn't use cpap    Snoring     Tear of medial cartilage or meniscus of knee, current 2/12/2015    Ulcerative colitis (HCC)      Past Surgical History:   Procedure Laterality Date    KNEE ARTHROSCOPY  2/12/2015    Performed by Greg Chaves M.D. at SURGERY [facility] ORS    " MENISCECTOMY, KNEE, MEDIAL  2/12/2015    Performed by Greg Chaves M.D. at SURGERY [facility]    KNEE ARTHROPLASTY TOTAL  2013    Knee Replacement, Total, left    FLAP GRAFT  6/20/2012    Performed by GREG NAGY at SURGERY [facility]    FULL THICKNESS SKIN GRAFT  6/20/2012    Performed by GREG NAGY at SURGERY [facility]    PIN INSERTION  5/4/2012    Performed by GREG NAGY at SURGERY [facility]    NERVE REPAIR  5/4/2012    Performed by GREG NAGY at SURGERY [facility]    ARTERY EXPLORATION OR REPAIR  5/4/2012    Performed by GREG NAGY at SURGERY [facility]    LIGAMENT REPAIR  5/4/2012    Performed by GREG NAGY at SURGERY [facility]     Current Outpatient Medications Ordered in Epic   Medication Sig Dispense Refill    atorvastatin (LIPITOR) 10 MG Tab Take 1 Tablet by mouth every evening. 90 Tablet 3    lansoprazole (PREVACID) 30 MG CAPSULE DELAYED RELEASE Take 1 Capsule by mouth every morning. Indications: Gastroesophageal Reflux Disease 90 Capsule 3    levothyroxine (SYNTHROID) 137 MCG Tab Take 1 Tablet by mouth every morning on an empty stomach. 90 Tablet 3    calcium citrate (CALCITRATE) 950 (200 Ca) MG Tab Take 1 Tablet by mouth every day.      Vitamin D, Cholecalciferol, (CHOLECALCIFEROL) 25 MCG (1000 UT) Tab Take 1 Tablet by mouth every day.      Omega-3 Fatty Acids (FISH OIL) 1000 MG CAPS capsule Take 1 Capsule by mouth every morning.       No current [facility]-ordered facility-administered medications on file.     Family History:    History reviewed. No pertinent family history.   Personal/Social History:    Social History     Tobacco Use    Smoking status: Never    Smokeless tobacco: Never   Vaping Use    Vaping Use: Never used   Substance Use Topics    Alcohol use: No    Drug use: No     Social History     Social History Narrative    Not on file      Review of Systems:   General: Negative for fever/chills and unexpected weight  "change.   Respiratory:  Negative for cough and dyspnea.    Cardiovascular:  Negative for chest pain and palpitations.  Musculoskeletal:  Negative for myalgias.   Skin: Positive for wart.  Neurological:  Negative for numbness/tingling and headaches.     Objective  Physical Exam:   /58 (BP Location: Left arm, Patient Position: Sitting, BP Cuff Size: Adult)   Pulse 70   Temp 36.5 °C (97.7 °F) (Temporal)   Resp 20   Ht 1.88 m (6' 2\")   Wt 87.7 kg (193 lb 4 oz)   SpO2 97%  Body mass index is 24.81 kg/m².  General:  Alert and oriented.  Well appearing.  NAD  Neck: Supple without JVD. No lymphadenopathy.  Pulmonary:  Normal effort.  Clear to ausculation without rales, ronchi, or wheezing.  Cardiovascular:  Regular rate and rhythm without murmur, rubs or gallop.   Skin: Left palm of hand with 2 mm in diameter wart.  Musculoskeletal:  No extremity cyanosis, clubbing, or edema.      Procedure note: Areas described above on skin were treated with liquid nitrogen. Canister was held in upright position with cryo tip nozzle approximately 1 cm away from the lesion before the trigger was pressed to activate flow of Liquid Nitrogen. Liquid nitrogen was applied for 10-12 seconds to the skin lesion and the expected blistering or scabbing reaction explained. Patient reminded not pick at the area and to expect hypopigmented scars from the procedure. Return if lesion fails to fully resolve.       Assessment/Plan  1. Pure hypercholesterolemia  Chronic and stable.  Continue to take Atorvastatin 10 mg every evening.  Educated on a healthy diet and exercise.  Due for updated labs.  - atorvastatin (LIPITOR) 10 MG Tab; Take 1 Tablet by mouth every evening.  Dispense: 90 Tablet; Refill: 3  - Lipid Profile; Future    2. Gastroesophageal reflux disease without esophagitis  Chronic and ongoing.  Continue to take Lansoprazole 30 mg daily.  - lansoprazole (PREVACID) 30 MG CAPSULE DELAYED RELEASE; Take 1 Capsule by mouth every morning. " Indications: Gastroesophageal Reflux Disease  Dispense: 90 Capsule; Refill: 3    3. Wart of hand  Acute and ongoing.  See above procedure note.    4. Acquired hypothyroidism  Chronic and ongoing.  Continue to take Levothyroxine 137 mcg every morning on an empty stomach.  Due for updated labs.  - levothyroxine (SYNTHROID) 137 MCG Tab; Take 1 Tablet by mouth every morning on an empty stomach.  Dispense: 90 Tablet; Refill: 3  - TSH; Future  - FREE THYROXINE; Future  - TRIIDOTHYRONINE; Future    5. Macrocytic anemia  Chronic and stable.  Due for updated labs.  - CBC WITH DIFFERENTIAL; Future    6. Need for vaccination  - Pneumococcal Conjugate Vaccine 20-Valent (19 yrs+)  - Influenza Vaccine, High Dose (65+ Only)    7. Encounter for screening for diabetes mellitus  Due for updated labs.  - Comp Metabolic Panel; Future      Health Maintenance: Discussed with patient.    Return if symptoms worsen or fail to improve.    I have placed the above orders and discussed them with an approved delegating provider.  The MA is performing the above orders under the direction of Dr. Smooth Huynh MD/DO.    Please note that this dictation was created using voice recognition software. I have made every reasonable attempt to correct obvious errors, but I expect that there are errors of grammar and possibly content that I did not discover before finalizing the note.    SHARITA Beard  [facility]

## 2023-04-11 ENCOUNTER — OFFICE VISIT (OUTPATIENT)
Dept: MEDICAL GROUP | Facility: PHYSICIAN GROUP | Age: 68
End: 2023-04-11
Payer: MEDICARE

## 2023-04-11 VITALS
BODY MASS INDEX: 24.53 KG/M2 | WEIGHT: 191.13 LBS | OXYGEN SATURATION: 96 % | HEART RATE: 78 BPM | TEMPERATURE: 98.7 F | SYSTOLIC BLOOD PRESSURE: 102 MMHG | HEIGHT: 74 IN | RESPIRATION RATE: 18 BRPM | DIASTOLIC BLOOD PRESSURE: 60 MMHG

## 2023-04-11 DIAGNOSIS — R05.3 CHRONIC COUGH: ICD-10-CM

## 2023-04-11 DIAGNOSIS — B07.9 WART OF HAND: ICD-10-CM

## 2023-04-11 PROCEDURE — 99214 OFFICE O/P EST MOD 30 MIN: CPT | Performed by: NURSE PRACTITIONER

## 2023-04-11 ASSESSMENT — PATIENT HEALTH QUESTIONNAIRE - PHQ9: CLINICAL INTERPRETATION OF PHQ2 SCORE: 0

## 2023-04-11 ASSESSMENT — FIBROSIS 4 INDEX: FIB4 SCORE: 1.35

## 2023-04-11 NOTE — ASSESSMENT & PLAN NOTE
Chronic and ongoing. He has had a cough for several months now. He has been worked up by Pulmonary and was told everything was fine. He states that the cough continues. He would like to be referred to ENT at this time.

## 2023-04-11 NOTE — PROGRESS NOTES
"Subjective  Chief Complaint  Wart and Cough    History of Present Illness  Arsen Stroud is a 68 y.o. male. This established patient is here today to discuss a wart and chronic cough.    Wart of hand  Chronic and ongoing. He was treated with Cryotherapy to his wart in the past. He states that he has also used the OTC things to help freeze it off. He states that it keeps coming back and it feels like it is deep in his hand. He is wondering if he should see a Dermatologist.    Chronic cough  Chronic and ongoing. He has had a cough for several months now. He has been worked up by Pulmonary and was told everything was fine. He states that the cough continues. He would like to be referred to ENT at this time.    Past Medical History    Allergies: Sulfa drugs and Pcn [penicillins]  Past Medical History:   Diagnosis Date    Acute pulmonary embolism without acute cor pulmonale (HCC) 6/11/2020    Arrhythmia     reports occasional \"Fluttering\", states work up neg.     Arthritis     knees    Chronic anticoagulation 6/22/2020    GIB (gastrointestinal bleeding) 6/22/2020    Hiatus hernia syndrome     High cholesterol     Hypothyroid     Palpitations 12/16/2019    Pulmonary emboli (HCC)     Sleep apnea     had a sleep study. doesn't use cpap    Snoring     Tear of medial cartilage or meniscus of knee, current 2/12/2015    Ulcerative colitis (HCC)      Past Surgical History:   Procedure Laterality Date    KNEE ARTHROSCOPY  2/12/2015    Performed by Greg Chaves M.D. at SURGERY [facility]    MENISCECTOMY, KNEE, MEDIAL  2/12/2015    Performed by Greg Chaves M.D. at SURGERY [facility]    KNEE ARTHROPLASTY TOTAL  2013    Knee Replacement, Total, left    FLAP GRAFT  6/20/2012    Performed by GREG NAGY at SURGERY [facility]    FULL THICKNESS SKIN GRAFT  6/20/2012    Performed by GREG NAGY at SURGERY [facility] ORS    PIN INSERTION  5/4/2012    Performed by GREG NAGY at SURGERY " "[facility] ORS    NERVE REPAIR  5/4/2012    Performed by EDWARD NAGY at SURGERY [facility] ORS    ARTERY EXPLORATION OR REPAIR  5/4/2012    Performed by EDWARD NAGY at SURGERY [facility] ORS    LIGAMENT REPAIR  5/4/2012    Performed by EDWARD NAGY at SURGERY [facility]     Current Outpatient Medications Ordered in Epic   Medication Sig Dispense Refill    atorvastatin (LIPITOR) 10 MG Tab Take 1 Tablet by mouth every evening. 90 Tablet 3    lansoprazole (PREVACID) 30 MG CAPSULE DELAYED RELEASE Take 1 Capsule by mouth every morning. Indications: Gastroesophageal Reflux Disease 90 Capsule 3    levothyroxine (SYNTHROID) 137 MCG Tab Take 1 Tablet by mouth every morning on an empty stomach. 90 Tablet 3    calcium citrate (CALCITRATE) 950 (200 Ca) MG Tab Take 1 Tablet by mouth every day.      Vitamin D, Cholecalciferol, (CHOLECALCIFEROL) 25 MCG (1000 UT) Tab Take 1 Tablet by mouth every day.      Omega-3 Fatty Acids (FISH OIL) 1000 MG CAPS capsule Take 1 Capsule by mouth every morning.       No current [facility]-ordered facility-administered medications on file.     Family History:    History reviewed. No pertinent family history.   Personal/Social History:    Social History     Tobacco Use    Smoking status: Never    Smokeless tobacco: Never   Vaping Use    Vaping Use: Never used   Substance Use Topics    Alcohol use: No    Drug use: No     Social History     Social History Narrative    Not on file      Review of Systems:   General: Negative for fever/chills and unexpected weight change.   Respiratory:  Negative for cough and dyspnea. Positive for cough.  Cardiovascular:  Negative for chest pain and palpitations  Musculoskeletal:  Negative for myalgias.   Skin: Positive for wart.    Objective  Physical Exam:   /60 (BP Location: Left arm, Patient Position: Sitting, BP Cuff Size: Adult)   Pulse 78   Temp 37.1 °C (98.7 °F) (Temporal)   Resp 18   Ht 1.88 m (6' 2\")   Wt 86.7 kg (191 lb 2 oz)   " SpO2 96%  Body mass index is 24.54 kg/m².  General:  Alert and oriented.  Well appearing.  NAD  Neck: Supple without JVD. No lymphadenopathy.  Pulmonary:  Normal effort.  Clear to ausculation without rales, ronchi, or wheezing.  Cardiovascular:  Regular rate and rhythm without murmur, rubs or gallop.   Skin: Left hand with 1mm in diameter flat circular area that is callused.  Musculoskeletal:  No extremity cyanosis, clubbing, or edema.      Assessment/Plan  1. Wart of hand  Chronic and ongoing.  Discussed that he should be seen by a Dermatologist to have the area removed, he is agreeable and the referral has been placed.  - Referral to Dermatology    2. Chronic cough  Chronic and ongoing.  Discussed a referral to ENT, he is agreeable and would like a referral placed at this time.  - Referral to ENT      Health Maintenance: Completed    Return if symptoms worsen or fail to improve.    Discussed that the patient carries some responsibility in management of their health care.    Please note that this dictation was created using voice recognition software. I have made every reasonable attempt to correct obvious errors, but I expect that there are errors of grammar and possibly content that I did not discover before finalizing the note.    SHARITA Beard  [facility]

## 2023-04-11 NOTE — ASSESSMENT & PLAN NOTE
Chronic and ongoing. He was treated with Cryotherapy to his wart in the past. He states that he has also used the OTC things to help freeze it off. He states that it keeps coming back and it feels like it is deep in his hand. He is wondering if he should see a Dermatologist.

## 2023-07-07 ENCOUNTER — HOSPITAL ENCOUNTER (OUTPATIENT)
Facility: MEDICAL CENTER | Age: 68
End: 2023-07-07
Attending: OTOLARYNGOLOGY
Payer: MEDICARE

## 2023-07-07 LAB — AMBIGUOUS DTTM AMBI4: NORMAL

## 2023-07-07 PROCEDURE — 87205 SMEAR GRAM STAIN: CPT

## 2023-07-07 PROCEDURE — 87186 SC STD MICRODIL/AGAR DIL: CPT

## 2023-07-07 PROCEDURE — 87070 CULTURE OTHR SPECIMN AEROBIC: CPT

## 2023-07-07 PROCEDURE — 87077 CULTURE AEROBIC IDENTIFY: CPT | Mod: 91

## 2023-07-07 PROCEDURE — 87076 CULTURE ANAEROBE IDENT EACH: CPT

## 2023-07-07 PROCEDURE — 87075 CULTR BACTERIA EXCEPT BLOOD: CPT

## 2023-07-08 LAB
GRAM STN SPEC: NORMAL
SIGNIFICANT IND 70042: NORMAL
SITE SITE: NORMAL
SOURCE SOURCE: NORMAL

## 2023-07-11 LAB
BACTERIA WND AEROBE CULT: ABNORMAL
BACTERIA WND AEROBE CULT: ABNORMAL
GRAM STN SPEC: ABNORMAL
SIGNIFICANT IND 70042: ABNORMAL
SITE SITE: ABNORMAL
SOURCE SOURCE: ABNORMAL

## 2023-07-12 LAB
BACTERIA SPEC ANAEROBE CULT: NORMAL
SIGNIFICANT IND 70042: NORMAL
SITE SITE: NORMAL
SOURCE SOURCE: NORMAL

## 2023-11-04 SDOH — ECONOMIC STABILITY: FOOD INSECURITY: WITHIN THE PAST 12 MONTHS, YOU WORRIED THAT YOUR FOOD WOULD RUN OUT BEFORE YOU GOT MONEY TO BUY MORE.: PATIENT DECLINED

## 2023-11-04 SDOH — HEALTH STABILITY: PHYSICAL HEALTH: ON AVERAGE, HOW MANY DAYS PER WEEK DO YOU ENGAGE IN MODERATE TO STRENUOUS EXERCISE (LIKE A BRISK WALK)?: 4 DAYS

## 2023-11-04 SDOH — ECONOMIC STABILITY: TRANSPORTATION INSECURITY
IN THE PAST 12 MONTHS, HAS LACK OF TRANSPORTATION KEPT YOU FROM MEETINGS, WORK, OR FROM GETTING THINGS NEEDED FOR DAILY LIVING?: NO

## 2023-11-04 SDOH — ECONOMIC STABILITY: HOUSING INSECURITY
IN THE LAST 12 MONTHS, WAS THERE A TIME WHEN YOU DID NOT HAVE A STEADY PLACE TO SLEEP OR SLEPT IN A SHELTER (INCLUDING NOW)?: NO

## 2023-11-04 SDOH — HEALTH STABILITY: MENTAL HEALTH

## 2023-11-04 SDOH — ECONOMIC STABILITY: TRANSPORTATION INSECURITY
IN THE PAST 12 MONTHS, HAS LACK OF RELIABLE TRANSPORTATION KEPT YOU FROM MEDICAL APPOINTMENTS, MEETINGS, WORK OR FROM GETTING THINGS NEEDED FOR DAILY LIVING?: NO

## 2023-11-04 SDOH — ECONOMIC STABILITY: FOOD INSECURITY: WITHIN THE PAST 12 MONTHS, THE FOOD YOU BOUGHT JUST DIDN'T LAST AND YOU DIDN'T HAVE MONEY TO GET MORE.: PATIENT DECLINED

## 2023-11-04 SDOH — ECONOMIC STABILITY: HOUSING INSECURITY

## 2023-11-04 SDOH — HEALTH STABILITY: PHYSICAL HEALTH: ON AVERAGE, HOW MANY MINUTES DO YOU ENGAGE IN EXERCISE AT THIS LEVEL?: 10 MIN

## 2023-11-04 SDOH — ECONOMIC STABILITY: INCOME INSECURITY: IN THE LAST 12 MONTHS, WAS THERE A TIME WHEN YOU WERE NOT ABLE TO PAY THE MORTGAGE OR RENT ON TIME?: NO

## 2023-11-04 SDOH — ECONOMIC STABILITY: TRANSPORTATION INSECURITY
IN THE PAST 12 MONTHS, HAS THE LACK OF TRANSPORTATION KEPT YOU FROM MEDICAL APPOINTMENTS OR FROM GETTING MEDICATIONS?: NO

## 2023-11-04 ASSESSMENT — SOCIAL DETERMINANTS OF HEALTH (SDOH)
HOW MANY DRINKS CONTAINING ALCOHOL DO YOU HAVE ON A TYPICAL DAY WHEN YOU ARE DRINKING: PATIENT DOES NOT DRINK
HOW OFTEN DO YOU GET TOGETHER WITH FRIENDS OR RELATIVES?: PATIENT DECLINED
WITHIN THE PAST 12 MONTHS, YOU WORRIED THAT YOUR FOOD WOULD RUN OUT BEFORE YOU GOT THE MONEY TO BUY MORE: PATIENT DECLINED
HOW OFTEN DO YOU ATTENT MEETINGS OF THE CLUB OR ORGANIZATION YOU BELONG TO?: MORE THAN 4 TIMES PER YEAR
HOW OFTEN DO YOU ATTENT MEETINGS OF THE CLUB OR ORGANIZATION YOU BELONG TO?: MORE THAN 4 TIMES PER YEAR

## 2023-11-04 ASSESSMENT — LIFESTYLE VARIABLES: HOW MANY STANDARD DRINKS CONTAINING ALCOHOL DO YOU HAVE ON A TYPICAL DAY: PATIENT DOES NOT DRINK

## 2023-11-06 ENCOUNTER — OFFICE VISIT (OUTPATIENT)
Dept: MEDICAL GROUP | Facility: PHYSICIAN GROUP | Age: 68
End: 2023-11-06
Payer: MEDICARE

## 2023-11-06 VITALS
SYSTOLIC BLOOD PRESSURE: 98 MMHG | WEIGHT: 191.5 LBS | HEIGHT: 74 IN | OXYGEN SATURATION: 96 % | RESPIRATION RATE: 12 BRPM | HEART RATE: 68 BPM | DIASTOLIC BLOOD PRESSURE: 58 MMHG | TEMPERATURE: 97.8 F | BODY MASS INDEX: 24.58 KG/M2

## 2023-11-06 DIAGNOSIS — E78.5 DYSLIPIDEMIA: ICD-10-CM

## 2023-11-06 DIAGNOSIS — Z93.2 ILEOSTOMY PRESENT (HCC): ICD-10-CM

## 2023-11-06 DIAGNOSIS — E03.9 ACQUIRED HYPOTHYROIDISM: ICD-10-CM

## 2023-11-06 DIAGNOSIS — D61.818 PANCYTOPENIA (HCC): ICD-10-CM

## 2023-11-06 DIAGNOSIS — K43.5 PARASTOMAL HERNIA WITHOUT OBSTRUCTION OR GANGRENE: ICD-10-CM

## 2023-11-06 DIAGNOSIS — Z11.59 NEED FOR HEPATITIS C SCREENING TEST: ICD-10-CM

## 2023-11-06 DIAGNOSIS — Z66 DNR NO CODE (DO NOT RESUSCITATE): ICD-10-CM

## 2023-11-06 DIAGNOSIS — R53.83 OTHER FATIGUE: ICD-10-CM

## 2023-11-06 DIAGNOSIS — G47.33 OSA (OBSTRUCTIVE SLEEP APNEA): ICD-10-CM

## 2023-11-06 DIAGNOSIS — R35.0 URINARY FREQUENCY: ICD-10-CM

## 2023-11-06 DIAGNOSIS — Z12.5 ENCOUNTER FOR SCREENING FOR MALIGNANT NEOPLASM OF PROSTATE: ICD-10-CM

## 2023-11-06 DIAGNOSIS — K21.9 GASTROESOPHAGEAL REFLUX DISEASE WITHOUT ESOPHAGITIS: ICD-10-CM

## 2023-11-06 PROBLEM — I10 ESSENTIAL HYPERTENSION: Status: ACTIVE | Noted: 2023-11-06

## 2023-11-06 PROBLEM — R53.81 MALAISE AND FATIGUE: Status: RESOLVED | Noted: 2023-11-06 | Resolved: 2023-11-06

## 2023-11-06 PROBLEM — R53.81 MALAISE AND FATIGUE: Status: ACTIVE | Noted: 2023-11-06

## 2023-11-06 PROBLEM — R55 SYNCOPE: Status: RESOLVED | Noted: 2020-06-22 | Resolved: 2023-11-06

## 2023-11-06 PROBLEM — T81.31XS WOUND DEHISCENCE, SURGICAL, SEQUELA: Status: RESOLVED | Noted: 2022-03-28 | Resolved: 2023-11-06

## 2023-11-06 PROBLEM — Q39.1 ATRESIA OF ESOPHAGUS WITH TRACHEO-ESOPHAGEAL FISTULA: Status: ACTIVE | Noted: 2023-11-06

## 2023-11-06 PROBLEM — G47.00 INSOMNIA WITH SLEEP APNEA: Status: ACTIVE | Noted: 2023-11-06

## 2023-11-06 PROBLEM — K51.00 CHRONIC ULCERATIVE PANCOLITIS (HCC): Status: RESOLVED | Noted: 2018-02-14 | Resolved: 2023-11-06

## 2023-11-06 PROBLEM — D68.9 BLOOD COAGULATION DISORDER (HCC): Status: ACTIVE | Noted: 2023-11-06

## 2023-11-06 PROBLEM — D50.9 IRON DEFICIENCY ANEMIA: Status: ACTIVE | Noted: 2023-11-06

## 2023-11-06 PROBLEM — I45.9 CONDUCTION DISORDER OF THE HEART: Status: ACTIVE | Noted: 2023-11-06

## 2023-11-06 PROBLEM — I45.9 CONDUCTION DISORDER OF THE HEART: Status: RESOLVED | Noted: 2023-11-06 | Resolved: 2023-11-06

## 2023-11-06 PROBLEM — D50.9 IRON DEFICIENCY ANEMIA: Status: RESOLVED | Noted: 2023-11-06 | Resolved: 2023-11-06

## 2023-11-06 PROBLEM — D53.9 MACROCYTIC ANEMIA: Status: RESOLVED | Noted: 2020-06-11 | Resolved: 2023-11-06

## 2023-11-06 PROBLEM — Q39.1 ATRESIA OF ESOPHAGUS WITH TRACHEO-ESOPHAGEAL FISTULA: Status: RESOLVED | Noted: 2023-11-06 | Resolved: 2023-11-06

## 2023-11-06 PROBLEM — I10 ESSENTIAL HYPERTENSION: Status: RESOLVED | Noted: 2023-11-06 | Resolved: 2023-11-06

## 2023-11-06 PROBLEM — K51.90 ULCERATIVE COLITIS (HCC): Status: RESOLVED | Noted: 2020-06-11 | Resolved: 2023-11-06

## 2023-11-06 PROBLEM — B07.9 WART OF HAND: Status: RESOLVED | Noted: 2022-11-17 | Resolved: 2023-11-06

## 2023-11-06 PROBLEM — G47.30 INSOMNIA WITH SLEEP APNEA: Status: ACTIVE | Noted: 2023-11-06

## 2023-11-06 PROCEDURE — 3074F SYST BP LT 130 MM HG: CPT | Performed by: PHYSICIAN ASSISTANT

## 2023-11-06 PROCEDURE — 3078F DIAST BP <80 MM HG: CPT | Performed by: PHYSICIAN ASSISTANT

## 2023-11-06 PROCEDURE — 99214 OFFICE O/P EST MOD 30 MIN: CPT | Performed by: PHYSICIAN ASSISTANT

## 2023-11-06 RX ORDER — FLUTICASONE PROPIONATE 50 MCG
SPRAY, SUSPENSION (ML) NASAL
COMMUNITY
Start: 2023-08-29

## 2023-11-06 RX ORDER — TAMSULOSIN HYDROCHLORIDE 0.4 MG/1
0.4 CAPSULE ORAL
Qty: 90 CAPSULE | Refills: 1 | Status: SHIPPED | OUTPATIENT
Start: 2023-11-06

## 2023-11-06 RX ORDER — AZELASTINE HYDROCHLORIDE 137 UG/1
SPRAY, METERED NASAL
COMMUNITY
Start: 2023-08-29

## 2023-11-06 RX ORDER — ATORVASTATIN CALCIUM 10 MG/1
10 TABLET, FILM COATED ORAL EVERY EVENING
Qty: 90 TABLET | Refills: 1 | Status: SHIPPED | OUTPATIENT
Start: 2023-11-06 | End: 2023-12-27 | Stop reason: SDUPTHER

## 2023-11-06 RX ORDER — LANSOPRAZOLE 30 MG/1
30 CAPSULE, DELAYED RELEASE ORAL EVERY MORNING
Qty: 90 CAPSULE | Refills: 1 | Status: SHIPPED | OUTPATIENT
Start: 2023-11-06 | End: 2023-12-27 | Stop reason: SDUPTHER

## 2023-11-06 RX ORDER — LEVOTHYROXINE SODIUM 137 UG/1
137 TABLET ORAL
Qty: 90 TABLET | Refills: 1 | Status: SHIPPED | OUTPATIENT
Start: 2023-11-06 | End: 2023-12-27 | Stop reason: SDUPTHER

## 2023-11-06 RX ORDER — MULTIVIT-MIN/IRON/FOLIC ACID/K 18-600-40
CAPSULE ORAL
COMMUNITY

## 2023-11-06 ASSESSMENT — ENCOUNTER SYMPTOMS
CHILLS: 0
FEVER: 0
SHORTNESS OF BREATH: 0

## 2023-11-06 ASSESSMENT — FIBROSIS 4 INDEX: FIB4 SCORE: 1.35

## 2023-11-06 NOTE — PROGRESS NOTES
Subjective:     CC: establish care; prior SHARITA Beard     HPI:   Arsen presents today with the following:    Problem   Blood Coagulation Disorder (Hcc)    History of 1 episode of multiple clots when he had UC.  No episodes since and reportedly no longer has UC.     Levi (Obstructive Sleep Apnea)    Unable to tolerate CPAP.     Dyslipidemia    Chronic, controlled.     Latest Labs:   Lab Results   Component Value Date/Time    CHOLSTRLTOT 130 02/07/2022 06:49 AM    LDL 63 02/07/2022 06:49 AM    HDL 52 02/07/2022 06:49 AM    TRIGLYCERIDE 75 02/07/2022 06:49 AM      Medications: Atorvastatin 10 mg daily.    Risk calculator: The 10-year ASCVD risk score (Geena DK, et al., 2019) is: 6.5%        Urinary Frequency    Chronic, uncontrolled.  Started a long time ago.  Would like to try Flomax.     Chronic Cough    Chronic, uncontrolled.  Currently managed by ENT.  He does notice a slight improvement with Flonase but continues to have the cough.     Parastomal Hernia    Chronic, stable.  Patient no longer sees any specialists.       Ileostomy Present (Hcc)    Chronic, stable.  Will let me know if he needs supplies.     Pancytopenia (Hcc)    Chronic, control unknown.  Due to repeat labs.       Dnr No Code (Do Not Resuscitate)    Verified with patient he remains DNR.     Acquired Hypothyroidism    Chronic, stable.  Tolerating levothyroxine 137 mcg daily.     Gastroesophageal Reflux Disease Without Esophagitis    Chronic, stable.  Tolerating lansoprazole 30 mg most days.  He sometimes takes it every other day unless his symptoms worsen.  History of dilatation x3, most recently around 2008.  No longer sees GI.     Atresia of Esophagus With Tracheo-Esophageal Fistula (Resolved)    750.3:Schatzki's ring     Iron Deficiency Anemia (Resolved)    280.9:Iron deficiency, Notes:- [employer]rees[employer] 03/02/2021     Malaise and Fatigue (Resolved)    780.79:Fatigue, Notes:- [employer]rees[employer] 03/02/2021     Essential Hypertension (Resolved)     "401.9:Hypertension     Conduction Disorder of The Heart (Resolved)    427.9:Arrhythmia     Wart of Hand (Resolved)   Wound Dehiscence, Surgical, Sequela (Resolved)   Ileostomy Status (Hcc) (Resolved)   Syncope (Resolved)   Ulcerative Colitis (Hcc) (Resolved)   Macrocytic Anemia (Resolved)   Chronic Ulcerative Pancolitis (Hcc) (Resolved)   Pure Hypercholesterolemia (Resolved)    Formatting of this note might be different from the original.     Peyronie's Disease (Resolved)    Formatting of this note might be different from the original.           ROS:  Review of Systems   Constitutional:  Negative for chills and fever.   Respiratory:  Negative for shortness of breath.    Cardiovascular:  Negative for chest pain.       Objective:     Exam:  BP 98/58 (BP Location: Right arm, Patient Position: Sitting, BP Cuff Size: Large adult)   Pulse 68   Temp 36.6 °C (97.8 °F) (Temporal)   Resp 12   Ht 1.88 m (6' 2\")   Wt 86.9 kg (191 lb 8 oz)   SpO2 96%   BMI 24.59 kg/m²  Body mass index is 24.59 kg/m².    Physical Exam  Vitals reviewed.   Constitutional:       General: He is not in acute distress.     Appearance: Normal appearance.   Pulmonary:      Effort: Pulmonary effort is normal.   Neurological:      General: No focal deficit present.      Mental Status: He is alert.   Psychiatric:         Mood and Affect: Mood normal.         Behavior: Behavior normal.         Judgment: Judgment normal.                  Assessment & Plan:     68 y.o. male with the following -     1. Dyslipidemia  Chronic, controlled.  Continue atorvastatin 10 mg daily.    - Lipid Profile; Future  - atorvastatin (LIPITOR) 10 MG Tab; Take 1 Tablet by mouth every evening. CHOLESTEROL  Dispense: 90 Tablet; Refill: 1    2. Urinary frequency  Chronic, uncontrolled.  Trial tamsulosin 0.4 mg daily.    - tamsulosin (FLOMAX) 0.4 MG capsule; Take 1 Capsule by mouth 1/2 hour after breakfast. URINARY SYMPTOMS  Dispense: 90 Capsule; Refill: 1    3. Gastroesophageal " reflux disease without esophagitis  Chronic, stable.  Continue lansoprazole 30 mg daily.    - lansoprazole (PREVACID) 30 MG CAPSULE DELAYED RELEASE; Take 1 Capsule by mouth every morning. Indications: Gastroesophageal Reflux Disease  Dispense: 90 Capsule; Refill: 1    4. Acquired hypothyroidism  Chronic, stable.  Continue levothyroxine 137 mcg daily.    - TSH WITH REFLEX TO FT4; Future  - levothyroxine (SYNTHROID) 137 MCG Tab; Take 1 Tablet by mouth every morning on an empty stomach. THYROID  Dispense: 90 Tablet; Refill: 1    5. Pancytopenia (HCC)  Chronic, control unknown.  Repeat labs.    - CBC WITH DIFFERENTIAL; Future    6. Other fatigue  Chronic, intermittent.  Checking labs.    - Comp Metabolic Panel; Future    7. ELEAZAR (obstructive sleep apnea)  Chronic, likely uncontrolled.  Unable to tolerate CPAP.    8. Ileostomy present (HCC)  9. Parastomal hernia without obstruction or gangrene  Chronic, stable.  Patient will let me know when he needs supplies.    10. Need for hepatitis C screening test    - HCV Scrn ( [id number] [employer]); Future    11. Encounter for screening for malignant neoplasm of prostate    - PROSTATE SPECIFIC AG SCREENING; Future    12. DNR no code (do not resuscitate)        Have labs drawn.    Healthcare Maintenance:            Return for lab discussion.    Please note that this dictation was created using voice recognition software. I have made every reasonable attempt to correct obvious errors, but I expect that there are errors of grammar and possibly content that I did not discover before finalizing the note.

## 2023-12-15 LAB
ALBUMIN SERPL-MCNC: 4.2 G/DL (ref 3.9–4.9)
ALBUMIN/GLOB SERPL: 1.6 {RATIO} (ref 1.2–2.2)
ALP SERPL-CCNC: 81 IU/L (ref 44–121)
ALT SERPL-CCNC: 28 IU/L (ref 0–44)
AST SERPL-CCNC: 25 IU/L (ref 0–40)
BASOPHILS # BLD AUTO: 0.1 X10E3/UL (ref 0–0.2)
BASOPHILS NFR BLD AUTO: 2 %
BILIRUB SERPL-MCNC: 1 MG/DL (ref 0–1.2)
BUN SERPL-MCNC: 15 MG/DL (ref 8–27)
BUN/CREAT SERPL: 12 (ref 10–24)
CALCIUM SERPL-MCNC: 9.7 MG/DL (ref 8.6–10.2)
CHLORIDE SERPL-SCNC: 104 MMOL/L (ref 96–106)
CHOLEST SERPL-MCNC: 132 MG/DL (ref 100–199)
CO2 SERPL-SCNC: 25 MMOL/L (ref 20–29)
CREAT SERPL-MCNC: 1.23 MG/DL (ref 0.76–1.27)
EGFRCR SERPLBLD CKD-EPI 2021: 65 ML/MIN/1.73
EOSINOPHIL # BLD AUTO: 0.9 X10E3/UL (ref 0–0.4)
EOSINOPHIL NFR BLD AUTO: 13 %
ERYTHROCYTE [DISTWIDTH] IN BLOOD BY AUTOMATED COUNT: 12.1 % (ref 11.6–15.4)
GLOBULIN SER CALC-MCNC: 2.6 G/DL (ref 1.5–4.5)
GLUCOSE SERPL-MCNC: 93 MG/DL (ref 70–99)
HCT VFR BLD AUTO: 49.8 % (ref 37.5–51)
HCV IGG SERPL QL IA: NON REACTIVE
HDLC SERPL-MCNC: 58 MG/DL
HGB BLD-MCNC: 16.2 G/DL (ref 13–17.7)
IMM GRANULOCYTES # BLD AUTO: 0 X10E3/UL (ref 0–0.1)
IMM GRANULOCYTES NFR BLD AUTO: 0 %
IMMATURE CELLS  115398: ABNORMAL
LABORATORY COMMENT REPORT: NORMAL
LDLC SERPL CALC-MCNC: 59 MG/DL (ref 0–99)
LYMPHOCYTES # BLD AUTO: 1.6 X10E3/UL (ref 0.7–3.1)
LYMPHOCYTES NFR BLD AUTO: 23 %
MCH RBC QN AUTO: 31.8 PG (ref 26.6–33)
MCHC RBC AUTO-ENTMCNC: 32.5 G/DL (ref 31.5–35.7)
MCV RBC AUTO: 98 FL (ref 79–97)
MONOCYTES # BLD AUTO: 0.6 X10E3/UL (ref 0.1–0.9)
MONOCYTES NFR BLD AUTO: 8 %
MORPHOLOGY BLD-IMP: ABNORMAL
NEUTROPHILS # BLD AUTO: 3.8 X10E3/UL (ref 1.4–7)
NEUTROPHILS NFR BLD AUTO: 54 %
NRBC BLD AUTO-RTO: ABNORMAL %
PLATELET # BLD AUTO: 280 X10E3/UL (ref 150–450)
POTASSIUM SERPL-SCNC: 5 MMOL/L (ref 3.5–5.2)
PROT SERPL-MCNC: 6.8 G/DL (ref 6–8.5)
PSA SERPL-MCNC: 2.8 NG/ML (ref 0–4)
RBC # BLD AUTO: 5.1 X10E6/UL (ref 4.14–5.8)
SODIUM SERPL-SCNC: 143 MMOL/L (ref 134–144)
TRIGL SERPL-MCNC: 73 MG/DL (ref 0–149)
TSH SERPL DL<=0.005 MIU/L-ACNC: 2.44 UIU/ML (ref 0.45–4.5)
VLDLC SERPL CALC-MCNC: 15 MG/DL (ref 5–40)
WBC # BLD AUTO: 7 X10E3/UL (ref 3.4–10.8)

## 2023-12-27 DIAGNOSIS — E03.9 ACQUIRED HYPOTHYROIDISM: ICD-10-CM

## 2023-12-27 DIAGNOSIS — K21.9 GASTROESOPHAGEAL REFLUX DISEASE WITHOUT ESOPHAGITIS: ICD-10-CM

## 2023-12-27 DIAGNOSIS — E78.5 DYSLIPIDEMIA: ICD-10-CM

## 2023-12-27 RX ORDER — ATORVASTATIN CALCIUM 10 MG/1
10 TABLET, FILM COATED ORAL EVERY EVENING
Qty: 90 TABLET | Refills: 3 | Status: SHIPPED | OUTPATIENT
Start: 2023-12-27

## 2023-12-27 RX ORDER — LANSOPRAZOLE 30 MG/1
30 CAPSULE, DELAYED RELEASE ORAL EVERY MORNING
Qty: 90 CAPSULE | Refills: 3 | Status: SHIPPED | OUTPATIENT
Start: 2023-12-27

## 2023-12-27 RX ORDER — LEVOTHYROXINE SODIUM 137 UG/1
137 TABLET ORAL
Qty: 90 TABLET | Refills: 3 | Status: SHIPPED | OUTPATIENT
Start: 2023-12-27

## 2024-07-10 ENCOUNTER — OFFICE VISIT (OUTPATIENT)
Dept: MEDICAL GROUP | Facility: PHYSICIAN GROUP | Age: 69
End: 2024-07-10
Payer: MEDICARE

## 2024-07-10 VITALS
DIASTOLIC BLOOD PRESSURE: 60 MMHG | WEIGHT: 180 LBS | RESPIRATION RATE: 20 BRPM | SYSTOLIC BLOOD PRESSURE: 100 MMHG | HEART RATE: 70 BPM | OXYGEN SATURATION: 96 % | TEMPERATURE: 98.4 F | HEIGHT: 74 IN | BODY MASS INDEX: 23.1 KG/M2

## 2024-07-10 DIAGNOSIS — G47.33 OSA (OBSTRUCTIVE SLEEP APNEA): ICD-10-CM

## 2024-07-10 DIAGNOSIS — E55.9 VITAMIN D DEFICIENCY: ICD-10-CM

## 2024-07-10 DIAGNOSIS — Z13.228 SCREENING FOR ENDOCRINE, METABOLIC AND IMMUNITY DISORDER: ICD-10-CM

## 2024-07-10 DIAGNOSIS — R53.83 OTHER FATIGUE: ICD-10-CM

## 2024-07-10 DIAGNOSIS — E78.5 DYSLIPIDEMIA: ICD-10-CM

## 2024-07-10 DIAGNOSIS — Z13.0 SCREENING FOR ENDOCRINE, METABOLIC AND IMMUNITY DISORDER: ICD-10-CM

## 2024-07-10 DIAGNOSIS — Z13.29 SCREENING FOR ENDOCRINE, METABOLIC AND IMMUNITY DISORDER: ICD-10-CM

## 2024-07-10 DIAGNOSIS — E03.9 ACQUIRED HYPOTHYROIDISM: ICD-10-CM

## 2024-07-10 PROCEDURE — 3078F DIAST BP <80 MM HG: CPT

## 2024-07-10 PROCEDURE — 3074F SYST BP LT 130 MM HG: CPT

## 2024-07-10 PROCEDURE — 99214 OFFICE O/P EST MOD 30 MIN: CPT

## 2024-07-10 ASSESSMENT — FIBROSIS 4 INDEX: FIB4 SCORE: 1.13

## 2024-07-10 ASSESSMENT — PATIENT HEALTH QUESTIONNAIRE - PHQ9: CLINICAL INTERPRETATION OF PHQ2 SCORE: 0

## 2024-07-15 ENCOUNTER — HOSPITAL ENCOUNTER (OUTPATIENT)
Dept: LAB | Facility: MEDICAL CENTER | Age: 69
End: 2024-07-15
Payer: MEDICARE

## 2024-07-15 DIAGNOSIS — E78.5 DYSLIPIDEMIA: ICD-10-CM

## 2024-07-15 DIAGNOSIS — E55.9 VITAMIN D DEFICIENCY: ICD-10-CM

## 2024-07-15 DIAGNOSIS — Z13.29 SCREENING FOR ENDOCRINE, METABOLIC AND IMMUNITY DISORDER: ICD-10-CM

## 2024-07-15 DIAGNOSIS — Z13.0 SCREENING FOR ENDOCRINE, METABOLIC AND IMMUNITY DISORDER: ICD-10-CM

## 2024-07-15 DIAGNOSIS — Z13.228 SCREENING FOR ENDOCRINE, METABOLIC AND IMMUNITY DISORDER: ICD-10-CM

## 2024-07-15 DIAGNOSIS — E03.9 ACQUIRED HYPOTHYROIDISM: ICD-10-CM

## 2024-07-15 LAB
25(OH)D3 SERPL-MCNC: 41 NG/ML (ref 30–100)
ALBUMIN SERPL BCP-MCNC: 4 G/DL (ref 3.2–4.9)
ALBUMIN/GLOB SERPL: 1.3 G/DL
ALP SERPL-CCNC: 82 U/L (ref 30–99)
ALT SERPL-CCNC: 17 U/L (ref 2–50)
ANION GAP SERPL CALC-SCNC: 9 MMOL/L (ref 7–16)
AST SERPL-CCNC: 14 U/L (ref 12–45)
BASOPHILS # BLD AUTO: 2 % (ref 0–1.8)
BASOPHILS # BLD: 0.16 K/UL (ref 0–0.12)
BILIRUB SERPL-MCNC: 0.5 MG/DL (ref 0.1–1.5)
BUN SERPL-MCNC: 13 MG/DL (ref 8–22)
CALCIUM ALBUM COR SERPL-MCNC: 9.4 MG/DL (ref 8.5–10.5)
CALCIUM SERPL-MCNC: 9.4 MG/DL (ref 8.5–10.5)
CHLORIDE SERPL-SCNC: 104 MMOL/L (ref 96–112)
CHOLEST SERPL-MCNC: 131 MG/DL (ref 100–199)
CO2 SERPL-SCNC: 26 MMOL/L (ref 20–33)
CREAT SERPL-MCNC: 1.05 MG/DL (ref 0.5–1.4)
EOSINOPHIL # BLD AUTO: 0.73 K/UL (ref 0–0.51)
EOSINOPHIL NFR BLD: 9.3 % (ref 0–6.9)
ERYTHROCYTE [DISTWIDTH] IN BLOOD BY AUTOMATED COUNT: 47.8 FL (ref 35.9–50)
EST. AVERAGE GLUCOSE BLD GHB EST-MCNC: 126 MG/DL
GFR SERPLBLD CREATININE-BSD FMLA CKD-EPI: 78 ML/MIN/1.73 M 2
GLOBULIN SER CALC-MCNC: 3.1 G/DL (ref 1.9–3.5)
GLUCOSE SERPL-MCNC: 104 MG/DL (ref 65–99)
HBA1C MFR BLD: 6 % (ref 4–5.6)
HCT VFR BLD AUTO: 50.3 % (ref 42–52)
HDLC SERPL-MCNC: 55 MG/DL
HGB BLD-MCNC: 16.6 G/DL (ref 14–18)
IMM GRANULOCYTES # BLD AUTO: 0.03 K/UL (ref 0–0.11)
IMM GRANULOCYTES NFR BLD AUTO: 0.4 % (ref 0–0.9)
LDLC SERPL CALC-MCNC: 56 MG/DL
LYMPHOCYTES # BLD AUTO: 1.3 K/UL (ref 1–4.8)
LYMPHOCYTES NFR BLD: 16.6 % (ref 22–41)
MCH RBC QN AUTO: 32.2 PG (ref 27–33)
MCHC RBC AUTO-ENTMCNC: 33 G/DL (ref 32.3–36.5)
MCV RBC AUTO: 97.5 FL (ref 81.4–97.8)
MONOCYTES # BLD AUTO: 0.61 K/UL (ref 0–0.85)
MONOCYTES NFR BLD AUTO: 7.8 % (ref 0–13.4)
NEUTROPHILS # BLD AUTO: 5.01 K/UL (ref 1.82–7.42)
NEUTROPHILS NFR BLD: 63.9 % (ref 44–72)
NRBC # BLD AUTO: 0 K/UL
NRBC BLD-RTO: 0 /100 WBC (ref 0–0.2)
PLATELET # BLD AUTO: 234 K/UL (ref 164–446)
PMV BLD AUTO: 10.4 FL (ref 9–12.9)
POTASSIUM SERPL-SCNC: 4.8 MMOL/L (ref 3.6–5.5)
PROT SERPL-MCNC: 7.1 G/DL (ref 6–8.2)
RBC # BLD AUTO: 5.16 M/UL (ref 4.7–6.1)
SODIUM SERPL-SCNC: 139 MMOL/L (ref 135–145)
T3 SERPL-MCNC: 108 NG/DL (ref 60–181)
T4 FREE SERPL-MCNC: 1.75 NG/DL (ref 0.93–1.7)
TRIGL SERPL-MCNC: 101 MG/DL (ref 0–149)
TSH SERPL-ACNC: 0.51 UIU/ML (ref 0.35–5.5)
WBC # BLD AUTO: 7.8 K/UL (ref 4.8–10.8)

## 2024-07-15 PROCEDURE — 83036 HEMOGLOBIN GLYCOSYLATED A1C: CPT | Mod: GA

## 2024-07-15 PROCEDURE — 85025 COMPLETE CBC W/AUTO DIFF WBC: CPT

## 2024-07-15 PROCEDURE — 36415 COLL VENOUS BLD VENIPUNCTURE: CPT

## 2024-07-15 PROCEDURE — 84439 ASSAY OF FREE THYROXINE: CPT

## 2024-07-15 PROCEDURE — 84480 ASSAY TRIIODOTHYRONINE (T3): CPT

## 2024-07-15 PROCEDURE — 82306 VITAMIN D 25 HYDROXY: CPT

## 2024-07-15 PROCEDURE — 84443 ASSAY THYROID STIM HORMONE: CPT

## 2024-07-15 PROCEDURE — 80053 COMPREHEN METABOLIC PANEL: CPT

## 2024-07-15 PROCEDURE — 80061 LIPID PANEL: CPT

## 2024-09-06 ENCOUNTER — APPOINTMENT (OUTPATIENT)
Dept: MEDICAL GROUP | Facility: PHYSICIAN GROUP | Age: 69
End: 2024-09-06
Payer: MEDICARE

## 2024-09-06 VITALS
DIASTOLIC BLOOD PRESSURE: 58 MMHG | BODY MASS INDEX: 22.47 KG/M2 | HEART RATE: 65 BPM | SYSTOLIC BLOOD PRESSURE: 104 MMHG | OXYGEN SATURATION: 99 % | WEIGHT: 175.1 LBS | HEIGHT: 74 IN | TEMPERATURE: 98.2 F | RESPIRATION RATE: 16 BRPM

## 2024-09-06 DIAGNOSIS — R35.0 URINARY FREQUENCY: ICD-10-CM

## 2024-09-06 DIAGNOSIS — K43.5 PARASTOMAL HERNIA WITHOUT OBSTRUCTION OR GANGRENE: ICD-10-CM

## 2024-09-06 DIAGNOSIS — E78.5 DYSLIPIDEMIA: ICD-10-CM

## 2024-09-06 DIAGNOSIS — Z93.2 ILEOSTOMY PRESENT (HCC): ICD-10-CM

## 2024-09-06 DIAGNOSIS — G47.33 OSA (OBSTRUCTIVE SLEEP APNEA): ICD-10-CM

## 2024-09-06 DIAGNOSIS — R05.3 CHRONIC COUGH: ICD-10-CM

## 2024-09-06 DIAGNOSIS — D68.9 BLOOD COAGULATION DISORDER (HCC): ICD-10-CM

## 2024-09-06 DIAGNOSIS — K21.9 GASTROESOPHAGEAL REFLUX DISEASE WITHOUT ESOPHAGITIS: ICD-10-CM

## 2024-09-06 DIAGNOSIS — R73.03 PREDIABETES: ICD-10-CM

## 2024-09-06 DIAGNOSIS — E03.9 ACQUIRED HYPOTHYROIDISM: ICD-10-CM

## 2024-09-06 PROBLEM — D61.818 PANCYTOPENIA (HCC): Status: RESOLVED | Noted: 2020-06-22 | Resolved: 2024-09-06

## 2024-09-06 PROCEDURE — 3074F SYST BP LT 130 MM HG: CPT | Performed by: PHYSICIAN ASSISTANT

## 2024-09-06 PROCEDURE — 3078F DIAST BP <80 MM HG: CPT | Performed by: PHYSICIAN ASSISTANT

## 2024-09-06 PROCEDURE — 99214 OFFICE O/P EST MOD 30 MIN: CPT | Performed by: PHYSICIAN ASSISTANT

## 2024-09-06 ASSESSMENT — ENCOUNTER SYMPTOMS
SHORTNESS OF BREATH: 0
FEVER: 0
CHILLS: 0

## 2024-09-06 ASSESSMENT — FIBROSIS 4 INDEX: FIB4 SCORE: 0.97

## 2024-09-06 NOTE — ASSESSMENT & PLAN NOTE
Chronic, controlled.     Latest Labs:   Lab Results   Component Value Date/Time    CHOLSTRLTOT 131 07/15/2024 06:25 AM    LDL 56 07/15/2024 06:25 AM    HDL 55 07/15/2024 06:25 AM    TRIGLYCERIDE 101 07/15/2024 06:25 AM      Medications: Tolerating/continue Atorvastatin 10 mg daily.    Risk calculator: The 10-year ASCVD risk score (Geena ENRIQUEZ, et al., 2019) is: 7.1%

## 2024-09-06 NOTE — ASSESSMENT & PLAN NOTE
Chronic, uncontrolled.  Didn't help much.  Still having urinary frequency.  Referring to urology.    Interval history:   Started a long time ago.  Would like to try Flomax.    12/2023:

## 2024-09-06 NOTE — ASSESSMENT & PLAN NOTE
Chronic, uncontrolled.    Interval history:    Unable to toerate CPAP, diagnosed and tried on CPAP machine around 10 to 12 years ago.  States that this filled his stomach with air.  Currently struggling with fatigue and notes that he will nod off throughout the day

## 2024-09-06 NOTE — ASSESSMENT & PLAN NOTE
Chronic, stable.  Tolerating lansoprazole 30 mg most days.  He sometimes takes it every other day unless his symptoms worsen.  History of dilatation x3, most recently around 2008.  No longer sees GI.

## 2024-09-06 NOTE — ASSESSMENT & PLAN NOTE
Chronic, stable.  Patient no longer sees any specialists.    Interval history:  history of ulcerative colitis s/p total proctocolectomy with end ileostomy

## 2024-09-06 NOTE — ASSESSMENT & PLAN NOTE
Chronic, stable.   Tolerating/continue levothyroxine 137 mcg daily.    Interval history:   Endorses significant amount of fatigue and falling asleep while sitting in the chair during the day.

## 2024-09-06 NOTE — ASSESSMENT & PLAN NOTE
Chronic, stable.    Interval history:  history of ulcerative colitis s/p total proctocolectomy with end ileostomy.  Entire large intestine was removed.

## 2024-09-06 NOTE — ASSESSMENT & PLAN NOTE
Chronic, uncontrolled.  Currently managed by ENT.    Interval history:   He does notice a slight improvement with Flonase but continues to have the cough.

## 2024-09-06 NOTE — ASSESSMENT & PLAN NOTE
History of 1 episode of multiple clots when he had UC.  No episodes since and reportedly no longer has UC.

## 2024-09-06 NOTE — PROGRESS NOTES
SUBJECTIVE:     CC: Lab follow-up; established care 2023    HPI:   Arsen presents today with the following:    ASSESSMENT & PLAN by Problem:     Problem   Prediabetes    New diagnosis.  A1C 6.0 (2024).  Repeat A1c 2025.     Blood Coagulation Disorder (Hcc)    History of 1 episode of multiple clots when he had UC.  No episodes since and reportedly no longer has UC.     Levi (Obstructive Sleep Apnea)    Chronic, uncontrolled.  Repeating sleep study.  Patient is willing to try nasal pillows.  STOP-BAN       Dyslipidemia    Chronic, controlled.  Tolerating/continue Atorvastatin 10 mg daily.     Urinary Frequency    Chronic, uncontrolled.  Referring to urology.     Chronic Cough    Chronic, uncontrolled.  Currently managed by ENT.     Parastomal Hernia    Chronic, stable.  Patient no longer sees any specialists.       Ileostomy Present (Hcc)    Chronic, stable.    Interval history:  history of ulcerative colitis s/p total proctocolectomy with end ileostomy.  Entire large intestine was removed.     Acquired Hypothyroidism    Chronic, stable.   Tolerating/continue levothyroxine 137 mcg daily.        Gastroesophageal Reflux Disease Without Esophagitis    Chronic, stable.  Tolerating lansoprazole 30 mg most days.     Pancytopenia (Hcc) (Resolved)    Chronic, control unknown.  Due to repeat labs.         Colonoscopy: s/p total colectomy years ago due to UC    Repeat A1c around 2025.      Normal PSA 2023 outside labs.    Return in about 6 months (around 3/6/2025) for POC A1C.        Healthcare Maintenance:      HPI:     Problem List Items Addressed This Visit       Acquired hypothyroidism     Chronic, stable.   Tolerating/continue levothyroxine 137 mcg daily.    Interval history:   Endorses significant amount of fatigue and falling asleep while sitting in the chair during the day.         Blood coagulation disorder (HCC)     History of 1 episode of multiple clots when he had UC.  No episodes  since and reportedly no longer has UC.         Chronic cough     Chronic, uncontrolled.  Currently managed by ENT.    Interval history:   He does notice a slight improvement with Flonase but continues to have the cough.         Dyslipidemia     Chronic, controlled.     Latest Labs:   Lab Results   Component Value Date/Time    CHOLSTRLTOT 131 07/15/2024 06:25 AM    LDL 56 07/15/2024 06:25 AM    HDL 55 07/15/2024 06:25 AM    TRIGLYCERIDE 101 07/15/2024 06:25 AM      Medications: Tolerating/continue Atorvastatin 10 mg daily.    Risk calculator: The 10-year ASCVD risk score (Geena ENRIQUEZ, et al., 2019) is: 7.1%          Gastroesophageal reflux disease without esophagitis     Chronic, stable.  Tolerating lansoprazole 30 mg most days.  He sometimes takes it every other day unless his symptoms worsen.  History of dilatation x3, most recently around 2008.  No longer sees GI.         Ileostomy present ([facility])     Chronic, stable.    Interval history:  history of ulcerative colitis s/p total proctocolectomy with end ileostomy.  Entire large intestine was removed.         ELEAZAR (obstructive sleep apnea)     Chronic, uncontrolled.    Interval history:    Unable to toerate CPAP, diagnosed and tried on CPAP machine around 10 to 12 years ago.  States that this filled his stomach with air.  Currently struggling with fatigue and notes that he will nod off throughout the day         Relevant Orders    Overnight Home Sleep Study    Parastomal hernia     Chronic, stable.  Patient no longer sees any specialists.    Interval history:  history of ulcerative colitis s/p total proctocolectomy with end ileostomy          Prediabetes     New diagnosis.  A1C 6.0 (7/2024).  Repeat A1c February 2025.         Urinary frequency     Chronic, uncontrolled.  Didn't help much.  Still having urinary frequency.  Referring to urology.    Interval history:   Started a long time ago.  Would like to try Flomax.    12/2023:           Relevant Orders    Referral to  "Urology              ROS:  Review of Systems   Constitutional:  Negative for chills and fever.   Respiratory:  Negative for shortness of breath.    Cardiovascular:  Negative for chest pain.       OBJECTIVE:     Exam:  /58 (BP Location: Left arm, Patient Position: Sitting, BP Cuff Size: Adult)   Pulse 65   Temp 36.8 °C (98.2 °F) (Temporal)   Resp 16   Ht 1.88 m (6' 2\")   Wt 79.4 kg (175 lb 1.6 oz)   SpO2 99%   BMI 22.48 kg/m²  Body mass index is 22.48 kg/m².    Physical Exam  Constitutional:       Appearance: Normal appearance.   HENT:      Head: Normocephalic.   Eyes:      Conjunctiva/sclera: Conjunctivae normal.   Cardiovascular:      Rate and Rhythm: Normal rate and regular rhythm.      Pulses: Normal pulses.      Heart sounds: No murmur heard.     No gallop.   Pulmonary:      Effort: No respiratory distress.      Breath sounds: No wheezing.   Musculoskeletal:         General: No swelling.   Skin:     General: Skin is warm and dry.   Neurological:      General: No focal deficit present.      Mental Status: He is alert.   Psychiatric:         Mood and Affect: Mood normal.         Behavior: Behavior normal.           CHART REVIEW:     Labs:                    Please note that this dictation was created using voice recognition software. I have made every reasonable attempt to correct obvious errors, but I expect that there are errors of grammar and possibly content that I did not discover before finalizing the note.    "

## 2024-09-11 ENCOUNTER — TELEPHONE (OUTPATIENT)
Dept: HEALTH INFORMATION MANAGEMENT | Facility: OTHER | Age: 69
End: 2024-09-11
Payer: MEDICARE

## 2024-10-15 ENCOUNTER — OFFICE VISIT (OUTPATIENT)
Dept: UROLOGY | Facility: MEDICAL CENTER | Age: 69
End: 2024-10-15
Payer: MEDICARE

## 2024-10-15 DIAGNOSIS — N52.03 COMBINED ARTERIAL INSUFFICIENCY AND CORPORO-VENOUS OCCLUSIVE ERECTILE DYSFUNCTION: ICD-10-CM

## 2024-10-15 DIAGNOSIS — R35.0 URINARY FREQUENCY: ICD-10-CM

## 2024-10-15 DIAGNOSIS — R39.15 BENIGN PROSTATIC HYPERPLASIA (BPH) WITH URINARY URGENCY: Primary | ICD-10-CM

## 2024-10-15 DIAGNOSIS — N40.1 BENIGN PROSTATIC HYPERPLASIA (BPH) WITH URINARY URGENCY: Primary | ICD-10-CM

## 2024-10-15 DIAGNOSIS — Z12.5 PROSTATE CANCER SCREENING: ICD-10-CM

## 2024-10-15 DIAGNOSIS — N48.6 PEYRONIE'S DISEASE: ICD-10-CM

## 2024-10-15 LAB
POC POST-VOID: 10 ML
POC PRE-VOID: NORMAL

## 2024-10-15 PROCEDURE — 99205 OFFICE O/P NEW HI 60 MIN: CPT | Performed by: PHYSICIAN ASSISTANT

## 2024-10-15 PROCEDURE — 51798 US URINE CAPACITY MEASURE: CPT | Performed by: PHYSICIAN ASSISTANT

## 2024-10-15 RX ORDER — ALFUZOSIN HYDROCHLORIDE 10 MG/1
10 TABLET, EXTENDED RELEASE ORAL DAILY
Qty: 30 TABLET | Refills: 2 | Status: SHIPPED | OUTPATIENT
Start: 2024-10-15

## 2024-10-28 ENCOUNTER — HOSPITAL ENCOUNTER (OUTPATIENT)
Dept: LAB | Facility: MEDICAL CENTER | Age: 69
End: 2024-10-28
Attending: PHYSICIAN ASSISTANT
Payer: MEDICARE

## 2024-10-28 DIAGNOSIS — N52.03 COMBINED ARTERIAL INSUFFICIENCY AND CORPORO-VENOUS OCCLUSIVE ERECTILE DYSFUNCTION: ICD-10-CM

## 2024-10-28 LAB
ESTRADIOL SERPL-MCNC: 11.6 PG/ML
FSH SERPL-ACNC: 3.3 MIU/ML (ref 1.5–12.4)
LH SERPL-ACNC: 5.7 IU/L (ref 1.7–8.6)
PSA SERPL-MCNC: 3.72 NG/ML (ref 0–4)

## 2024-10-28 PROCEDURE — 83001 ASSAY OF GONADOTROPIN (FSH): CPT

## 2024-10-28 PROCEDURE — 84270 ASSAY OF SEX HORMONE GLOBUL: CPT

## 2024-10-28 PROCEDURE — 36415 COLL VENOUS BLD VENIPUNCTURE: CPT | Mod: GA

## 2024-10-28 PROCEDURE — 84403 ASSAY OF TOTAL TESTOSTERONE: CPT

## 2024-10-28 PROCEDURE — 84153 ASSAY OF PSA TOTAL: CPT | Mod: GA

## 2024-10-28 PROCEDURE — 84402 ASSAY OF FREE TESTOSTERONE: CPT

## 2024-10-28 PROCEDURE — 83002 ASSAY OF GONADOTROPIN (LH): CPT

## 2024-10-28 PROCEDURE — 82670 ASSAY OF TOTAL ESTRADIOL: CPT

## 2024-10-31 LAB
SHBG SERPL-SCNC: 46 NMOL/L (ref 19–76)
TESTOST FREE MFR SERPL: 1.6 % (ref 1.6–2.9)
TESTOST FREE SERPL-MCNC: 56 PG/ML (ref 47–244)
TESTOST SERPL-MCNC: 357 NG/DL (ref 300–720)
TESTOSTERONE.FREE+WB SERPL-MCNC: 140 NG/DL (ref 131–682)

## 2024-11-15 ENCOUNTER — APPOINTMENT (OUTPATIENT)
Dept: SLEEP MEDICINE | Facility: MEDICAL CENTER | Age: 69
End: 2024-11-15
Attending: PHYSICIAN ASSISTANT
Payer: MEDICARE

## 2024-11-15 DIAGNOSIS — G47.33 OSA (OBSTRUCTIVE SLEEP APNEA): ICD-10-CM

## 2024-11-15 PROCEDURE — G0400 HOME SLEEP TEST/TYPE 4 PORTA: HCPCS | Performed by: STUDENT IN AN ORGANIZED HEALTH CARE EDUCATION/TRAINING PROGRAM

## 2024-11-21 NOTE — PROCEDURES
DIAGNOSTIC HOME SLEEP TEST (HST) REPORT WatchPAT      PATIENT ID:  NAME:  Arsen Stroud  MRN:               [id number]  YOB: 1955  DATE OF STUDY: 11/15/2024      Impression:     This study shows evidence of:      1. Borderline obstructive sleep apnea with PAT apnea hypopnea index(pAHI 4%) of 4.2 per hour.  PAT respiratory disturbance index (pRDI) was 8.3 per hour. These findings are based on 7 channels recording of PAT signal with sleep staging, heart rate, pulse oximetry, actigraphy, body position, snoring and respiratory movement.     2. Oxygenation O2 Sat. mean O2 sat was 90%,  saida was 73%,  and maximum O2 at 95 %. O2 sat was at or  below 88% for 11.2 min of evaluation time. Oxygen Desaturation (>=4%) Index was 4.2/hr. AVG HR was 69 BPM.      TECHNICAL DESCRIPTION: Patient underwent home sleep apnea testing with peripheral arterial tone signal (WatchPAT™). This is a Type IV portable monitor and device per Medicare. Monitoring was done with 7 channels recording of PAT signal with sleep staging, heart rate, pulse oximetry, actigraphy, body position, snoring and respiratory movement. Prior to using the device, the patient received verbal and written instructions for its application and was provided with the help desk phone number for additional telephonic instruction with 24-hour availability of qualified personnel to answer questions.    Respiratory events:          General sleep summary: . Total recording time is 7 hours and 37 minutes and total Sleep time is 6 hours and 47 minutes. The patient spent 238.5 minutes in the supine position and 169 minutes in the nonsupine position.      Recommendations:    1. Given evidence of borderline ELEAZAR and nocturnal hypoxemia, advise in-lab PSG for comprehensive assessment especially iif patient remains symptomatic  2. In general patients with sleep apnea are advised to avoid alcohol and sedatives and to not operate a motor vehicle while drowsy. In  some cases alternative treatment options may prove effective in resolving sleep apnea in these options include upper airway surgery, the use of a dental orthotic or weight loss and positional therapy. Clinical correlation is required.         Yvette Ellis MD

## 2024-12-03 ENCOUNTER — OFFICE VISIT (OUTPATIENT)
Dept: UROLOGY | Facility: MEDICAL CENTER | Age: 69
End: 2024-12-03
Payer: MEDICARE

## 2024-12-03 DIAGNOSIS — R35.0 URINARY FREQUENCY: ICD-10-CM

## 2024-12-03 DIAGNOSIS — Z12.5 PROSTATE CANCER SCREENING: ICD-10-CM

## 2024-12-03 PROCEDURE — 99214 OFFICE O/P EST MOD 30 MIN: CPT | Performed by: PHYSICIAN ASSISTANT

## 2024-12-03 RX ORDER — VIBEGRON 75 MG/1
75 TABLET, FILM COATED ORAL DAILY
Qty: 30 TABLET | Refills: 2
Start: 2024-12-03

## 2024-12-03 RX ORDER — ALFUZOSIN HYDROCHLORIDE 10 MG/1
10 TABLET, EXTENDED RELEASE ORAL DAILY
Qty: 30 TABLET | Refills: 11 | Status: SHIPPED | OUTPATIENT
Start: 2024-12-03

## 2024-12-03 NOTE — PROGRESS NOTES
Reason for visit:   Urinary frequency  Peyronie's disease  ED  Prostate CA screening    HPI   69 y.o. male presenting for review of BPH with OAB symptoms and ED. The patient was last seen in the office on 10/15/24 and I recommended a trial of alfuzosin and obtaining his annual PSA level.     The patient reports no changes in urinary pattern with alfuzosin, compared with last visit. He still notices improvement with urinary stream, but denies any changes with OAB symptoms. He feels that he gets a strong urge to urinate and will only void a small amount. He is tolerating alfuzosin well, no side effects. He is most bothered by the OAB symptoms.    He did try timed voiding and bladder retraining, but found that it was very difficult. He would leak urine if he tried to hold back urination too long.    He denies any gross hematuria or dysuria.     10/15/24 OV:  HPI   Arsen Stroud is a 69 y.o. male with PMH of peyronie's disease and ED, presenting for evaluation of urinary frequency that has persisted for over 1 year.     Most bothered by weak stream, urgency and incomplete emptying. He feels that symptoms are not as bad during the day, but if he is sleeping and has to urinate it is very hard to initiate a stream. This is also noticed in the AM. He visited his PCP 1 year ago and was prescribed a trial on tamsulosin x4 weeks. He did not feel that there was any change in his urinary pattern.       The patient also reports that he has a history of peyronie's disease and was followed by a urologist years ago for this. He was told that there was not anything that could be done to help him. He is not sexually active with his wife due to her health. He does not feel that the curvature negatively impacts masturbation and it does not cause pain.      He reports some ED. He also complains of fatigue. He states that he will fall asleep during the day if he sits down for too long. He has never tried a PDE5-I before.        Denies history of kidney stones or recurrent UTIs. Denies history of blood in the urine or dysuria. He does have two uncles with prostate CA. One uncle passed and one is elderly and on surveillance.      PVR: 10 mL  IPSS: 15/3    Urologic PMH:    Denies     There were no vitals filed for this visit.      Physical Exam:   General:  Alert & Oriented, NAD     Assessment and plan:     BPH with OAB     -- Continue behavioral options for management of OAB symptoms including: timed voiding, good day time fluid management, nocturia guidelines, bladder retraining techniques and elimination of all bladder irritants in diet   -- We also discussed the option to trial a medication for OAB symptoms -- We reviewed that all OAB medications have side effects that include: dry mouth, dry eyes, constipation and urinary retention -- he would like to trial Gemtesa 75 mg daily   -- Continue alfuzosin 10 mg daily  -- Will follow-up to review symptoms in 6-8 weeks     Prostate CA screening    -- We reviewed PSA level and discussed that it is WNL  -- Continue with annual prostate CA screening -- PSA ordered for 1 year    ED     -- We reviewed testosterone levels and FSH/LH, SHBG and estradiol -- all WNL  -- Discussed option to start daily cialis 5 mg -- we reviewed MOA and how it can also improve urinary pattern -- patient would like to avoid for now  -- Follow-up as needed    I personally reviewed the patient's pertinent medical records and labs/images available to me.     Megan Navarro PA-C

## 2024-12-29 DIAGNOSIS — E78.5 DYSLIPIDEMIA: ICD-10-CM

## 2024-12-29 DIAGNOSIS — E03.9 ACQUIRED HYPOTHYROIDISM: ICD-10-CM

## 2024-12-29 DIAGNOSIS — K21.9 GASTROESOPHAGEAL REFLUX DISEASE WITHOUT ESOPHAGITIS: ICD-10-CM

## 2024-12-30 DIAGNOSIS — G62.89 OTHER POLYNEUROPATHY: ICD-10-CM

## 2024-12-30 RX ORDER — LANSOPRAZOLE 30 MG/1
30 CAPSULE, DELAYED RELEASE ORAL EVERY MORNING
Qty: 90 CAPSULE | Refills: 1 | Status: SHIPPED | OUTPATIENT
Start: 2024-12-30

## 2024-12-30 RX ORDER — ATORVASTATIN CALCIUM 10 MG/1
10 TABLET, FILM COATED ORAL EVERY EVENING
Qty: 90 TABLET | Refills: 1 | Status: SHIPPED | OUTPATIENT
Start: 2024-12-30

## 2024-12-30 RX ORDER — LEVOTHYROXINE SODIUM 137 UG/1
137 TABLET ORAL
Qty: 90 TABLET | Refills: 1 | Status: SHIPPED | OUTPATIENT
Start: 2024-12-30

## 2024-12-30 RX ORDER — PREGABALIN 75 MG/1
75 CAPSULE ORAL 2 TIMES DAILY
Qty: 180 CAPSULE | Refills: 1 | Status: SHIPPED | OUTPATIENT
Start: 2024-12-30 | End: 2025-03-30

## 2025-01-21 ENCOUNTER — OFFICE VISIT (OUTPATIENT)
Dept: UROLOGY | Facility: MEDICAL CENTER | Age: 70
End: 2025-01-21
Payer: MEDICARE

## 2025-01-21 DIAGNOSIS — N40.1 BENIGN PROSTATIC HYPERPLASIA (BPH) WITH URINARY URGENCY: ICD-10-CM

## 2025-01-21 DIAGNOSIS — N40.1 BENIGN PROSTATIC HYPERPLASIA (BPH) WITH URINARY URGENCY: Primary | ICD-10-CM

## 2025-01-21 DIAGNOSIS — R35.0 URINARY FREQUENCY: ICD-10-CM

## 2025-01-21 DIAGNOSIS — R39.15 BENIGN PROSTATIC HYPERPLASIA (BPH) WITH URINARY URGENCY: Primary | ICD-10-CM

## 2025-01-21 DIAGNOSIS — R39.15 BENIGN PROSTATIC HYPERPLASIA (BPH) WITH URINARY URGENCY: ICD-10-CM

## 2025-01-21 PROCEDURE — 99213 OFFICE O/P EST LOW 20 MIN: CPT | Performed by: PHYSICIAN ASSISTANT

## 2025-01-21 RX ORDER — VIBEGRON 75 MG/1
75 TABLET, FILM COATED ORAL DAILY
Qty: 30 TABLET | Refills: 2
Start: 2025-01-21

## 2025-01-21 RX ORDER — TROSPIUM CHLORIDE 20 MG/1
20 TABLET, FILM COATED ORAL 2 TIMES DAILY
Qty: 60 TABLET | Refills: 5 | Status: SHIPPED | OUTPATIENT
Start: 2025-01-21 | End: 2025-01-21

## 2025-01-21 RX ORDER — TROSPIUM CHLORIDE 20 MG/1
20 TABLET, FILM COATED ORAL 2 TIMES DAILY
Qty: 60 TABLET | Refills: 5 | Status: SHIPPED | OUTPATIENT
Start: 2025-01-21 | End: 2025-01-21 | Stop reason: CLARIF

## 2025-01-21 NOTE — PROGRESS NOTES
Reason for visit:   Urinary frequency    HPI   70 y.o. male presenting for review of BPH with urinary frequency. The patient was last seen in the office on 12/3/24 and I recommended a trial of Gemtesa 75 mg daily in addition to alfuzosin 10 mg daily.    The patient feels that his urinary pattern has significantly improved with the addition of Gemtesa. He does still experience severe and sudden urgency at times. He cannot identify any specific triggers.     He does notice some dry mouth in the AM but it resolves quickly.     He denies any gross hematuria or dysuria.     12/3/24 OV:  HPI   69 y.o. male presenting for review of BPH with OAB symptoms and ED. The patient was last seen in the office on 10/15/24 and I recommended a trial of alfuzosin and obtaining his annual PSA level.      The patient reports no changes in urinary pattern with alfuzosin, compared with last visit. He still notices improvement with urinary stream, but denies any changes with OAB symptoms. He feels that he gets a strong urge to urinate and will only void a small amount. He is tolerating alfuzosin well, no side effects. He is most bothered by the OAB symptoms.     He did try timed voiding and bladder retraining, but found that it was very difficult. He would leak urine if he tried to hold back urination too long.     He denies any gross hematuria or dysuria.      10/15/24 OV:  HPI   Arsen Stroud is a 69 y.o. male with PMH of peyronie's disease and ED, presenting for evaluation of urinary frequency that has persisted for over 1 year.     Most bothered by weak stream, urgency and incomplete emptying. He feels that symptoms are not as bad during the day, but if he is sleeping and has to urinate it is very hard to initiate a stream. This is also noticed in the AM. He visited his PCP 1 year ago and was prescribed a trial on tamsulosin x4 weeks. He did not feel that there was any change in his urinary pattern.       The patient also  reports that he has a history of peyronie's disease and was followed by a urologist years ago for this. He was told that there was not anything that could be done to help him. He is not sexually active with his wife due to her health. He does not feel that the curvature negatively impacts masturbation and it does not cause pain.      He reports some ED. He also complains of fatigue. He states that he will fall asleep during the day if he sits down for too long. He has never tried a PDE5-I before.       Denies history of kidney stones or recurrent UTIs. Denies history of blood in the urine or dysuria. He does have two uncles with prostate CA. One uncle passed and one is elderly and on surveillance.      PVR: 10 mL  IPSS: 15/3    Urologic PMH:    Urinary frequency  Peyronie's disease  ED  Prostate CA screening     There were no vitals filed for this visit.      Physical Exam:   General:  Alert & Oriented, NAD     Assessment and plan:     BPH with OAB     -- Continue behavioral options for management of OAB symptoms including: timed voiding, good day time fluid management, nocturia guidelines, bladder retraining techniques and elimination of all bladder irritants in diet   -- We reviewed option to trial an alternative medication as Gemtesa is not covered by Medicare -- specifically discussing trospium  -- The patient will continue gemtesa 75 mg daily -- samples provided -- we will explore prior auth options through insurance  -- Continue alfuzosin 10 mg daily  -- Will follow-up to review symptoms in 3 months     Prostate CA screening     -- Continue with annual prostate CA screening -- PSA ordered for December 2025    I personally reviewed the patient's pertinent medical records and labs/images available to me.     Megan Navarro PA-C

## 2025-02-27 DIAGNOSIS — G47.33 OSA (OBSTRUCTIVE SLEEP APNEA): ICD-10-CM

## 2025-03-04 NOTE — Clinical Note
REFERRAL APPROVAL NOTICE         Sent on March 4, 2025                   Mic Stroud  [address] [place] [address]                   Dear Mr. Stroud,    After a careful review of the medical information and benefit coverage, [facility] has processed your referral. See below for additional details.    If applicable, you must be actively enrolled with your insurance for coverage of the authorized service. If you have any questions regarding your coverage, please contact your insurance directly.    REFERRAL INFORMATION   Referral #:  [id number]  Referred-To Department    Referred-By Provider:  Pulmonary and Sleep Medicine    Caitlin Parmar P.A.-C.   Pulmonary/sleep [facility]      [address]  [place] [address] [phone] [address], [address]  [place] [address] [phone]    Referral Start Date:  02/27/2025  Referral End Date:   02/27/2026           SCHEDULING  If you do not already have an appointment, please call [phone] to make an appointment.   MORE INFORMATION  As a reminder, [facility] ownership has changed, meaning this location is now owned and operated by [facility]. As such, we want to clarify that our patients should expect to receive two separate bills for the services received at [facility] - one representing the [facility] facility fees as the owner of the establishment, and the other to represent the physician's services and subsequent fees. You can speak with your insurance carrier for a pricing estimate by calling the customer service number on the back of your card and ask about charges for a hospital outpatient visit.  If you do not already have a [employer] account, sign up at: [email or web address]  You can access your medical information, make appointments, see lab results,  billing information, and more.  If you have questions regarding this referral, please contact  the [facility] department at:             [phone]. Monday - Friday 7:30AM - 5:00PM.      Sincerely,  [facility]

## 2025-03-14 ASSESSMENT — ENCOUNTER SYMPTOMS
SLEEP DISTURBANCE: 1
TIME GOING TO BED: VARRIES

## 2025-03-17 ENCOUNTER — OFFICE VISIT (OUTPATIENT)
Dept: SLEEP MEDICINE | Facility: MEDICAL CENTER | Age: 70
End: 2025-03-17
Payer: MEDICARE

## 2025-03-17 VITALS
WEIGHT: 180 LBS | RESPIRATION RATE: 16 BRPM | DIASTOLIC BLOOD PRESSURE: 72 MMHG | OXYGEN SATURATION: 95 % | HEIGHT: 73 IN | SYSTOLIC BLOOD PRESSURE: 122 MMHG | HEART RATE: 76 BPM | BODY MASS INDEX: 23.86 KG/M2

## 2025-03-17 DIAGNOSIS — G47.19 EXCESSIVE DAYTIME SLEEPINESS: ICD-10-CM

## 2025-03-17 DIAGNOSIS — G47.33 OSA (OBSTRUCTIVE SLEEP APNEA): ICD-10-CM

## 2025-03-17 DIAGNOSIS — R06.81 WITNESSED EPISODE OF APNEA: ICD-10-CM

## 2025-03-17 DIAGNOSIS — G47.34 NOCTURNAL HYPOXIA: ICD-10-CM

## 2025-03-17 DIAGNOSIS — R06.83 SNORING: ICD-10-CM

## 2025-03-17 PROCEDURE — 99212 OFFICE O/P EST SF 10 MIN: CPT

## 2025-03-17 PROCEDURE — 99214 OFFICE O/P EST MOD 30 MIN: CPT

## 2025-03-17 ASSESSMENT — FIBROSIS 4 INDEX: FIB4 SCORE: 0.99

## 2025-03-17 NOTE — PROGRESS NOTES
[facility] Consult Note     Date: 3/17/2025 / Time: 10:46 AM      Thank you for requesting a sleep medicine consultation on Arsen Stroud at the sleep center. Presents today with the chief complaints of snoring , excessive daytime sleepiness, witnessed apnea, and previous diagnosis of ELEAZAR. He is referred to reestablish care with sleep medicine .     HISTORY OF PRESENT ILLNESS:     Arsen Stroud is a 70 y.o. male with history of ELEAZAR and chronic cough.  Mic presents to Sleep Clinic for evaluation and treatment of sleep disorder breathing and previous diagnosis of ELEAZAR.     Patient was diagnosed with ELEAZAR sometime between 2010 and 2015 . At that time, patient was started on CPAP.  Patient was unable to tolerate CPAP at that time due to complaints of aerophagia as well as poor insurance coverage.  Patient reports seeking care now due to continued excessive daytime sleepiness and probability of nodding off throughout the day if not actively doing an activity.  Patient does report snoring, witnessed apneas, frequent nighttime awakenings.  Patient was recently had an HST which was performed 11/15/2024.  Based on 4% AHI patient measured 4.2.  RDI was 8.3, saida O2 was 73% and patient spent 11 minutes with oxygen saturation less than 88%.  Due to severe reported symptoms and borderline scoring with previous diagnosis of obstructive sleep apnea it is recommended that patient return for in-lab titration study.  Patient also reports periodic limb movements that wake him up at times feels best evaluated in the sleep lab to determine if this is the cause as frequent arousals and daytime symptoms.    As per supplemental questionnaire to be scanned or imported into chart:    [place] Sleepiness Score: 17    Sleep Schedule  Bedtime:  10PM   Wake time: 5AM  Sleep-onset latency: <15 min  Awakenings from sleep: 3  Difficulty falling back asleep: yes   Bedroom partner: yes  Naps: yes, most days     DAYTIME  "SYMPTOMS:   Excessive daytime sleepiness: yes  Daytime fatigue: yes  Difficulty concentrating: no  Memory problems: sometimes  Irritability:no  Work/school performance issues: no  Sleepiness with driving: yes  Caffeine/stimulant use: couple cups of coffee sometimes soda   Alcohol use:No     SLEEP RELATED SYMPTOMS  Snoring: yes  Witnessed apnea or gasping/choking: Yes  Dry mouth or mouth breathing: yes, may be r/t meds   Sweating: sometimes  Teeth grinding/biting: yes  Morning headaches: sometimes  Refreshed Upon Awakening: yes     SLEEP RELATED BEHAVIORS:  Parasomnias (walking, talking, eating, violence): No   Leg kicking: sometimes  Restless legs - \"urge to move\": sometimes, passes spontaneously   Nightmares: no Recurrent: No   Dream enactment: No      NARCOLEPSY:  Cataplexy: No   Sleep paralysis: No   Sleep attacks: No   Hypnagogic/hypnopompic hallucinations: No     MEDICAL HISTORY  Past Medical History:   Diagnosis Date    Acute pulmonary embolism without acute cor pulmonale (HCC) 06/11/2020    Anemia     Apnea, sleep     Arrhythmia     reports occasional \"Fluttering\", states work up neg.     Arthritis     knees    Blood transfusion without reported diagnosis     Chickenpox     Chronic anticoagulation 06/22/2020    Cough     Daytime sleepiness     Difficulty swallowing     Dizziness     Fatigue     Frequent urination     Gasping for breath     GERD (gastroesophageal reflux disease)     Togolese measles     GIB (gastrointestinal bleeding) 06/22/2020    Hearing difficulty     Heartburn     Hiatus hernia syndrome     High cholesterol     Hoarseness, persistent     Hypothyroid     Hypothyroidism     Influenza     Insomnia     Morning headache     Mumps     Nasal drainage     Painful joint     Palpitations 12/16/2019    Pneumonia     Pulmonary emboli (HCC)     Pulmonary embolism (HCC)     Rhinitis     Ringing in ears     Skin change     Sleep apnea     had a sleep study. doesn't use cpap    Snoring     Sore muscles     " Sore throat, chronic     Sputum production     Swelling of lower extremity     Tear of medial cartilage or meniscus of knee, current 02/12/2015    Tonsillitis     Ulcerative colitis (HCC)     Vision loss     Wears glasses         SURGICAL HISTORY  Past Surgical History:   Procedure Laterality Date    KNEE ARTHROSCOPY  02/12/2015    Performed by Greg Chaves M.D. at SURGERY [facility]    MENISCECTOMY, KNEE, MEDIAL  02/12/2015    Performed by Greg Chaves M.D. at SURGERY [facility]    KNEE ARTHROPLASTY TOTAL  01/01/2013    Knee Replacement, Total, left    FLAP GRAFT  06/20/2012    Performed by GREG NAGY at SURGERY [facility]    FULL THICKNESS SKIN GRAFT  06/20/2012    Performed by GREG NAGY at SURGERY [facility]    PIN INSERTION  05/04/2012    Performed by GREG NAGY at SURGERY [facility]    NERVE REPAIR  05/04/2012    Performed by GREG NAGY at SURGERY [facility]    ARTERY EXPLORATION OR REPAIR  05/04/2012    Performed by GREG NAGY at SURGERY [facility]    LIGAMENT REPAIR  05/04/2012    Performed by GREG NAGY at SURGERY [facility]    ARTHROSCOPY, KNEE      CHOLECYSTECTOMY      COLON RESECTION      EYE SURGERY      HAND SURGERY      Sevored finger    HERNIA REPAIR      ORIF, KNEE      Both knees - joint replacement    SHOULDER SURGERY      Both shoulders - Rotar cuff orthoscopic repair and clean    SINUSCOPE      TONSILLECTOMY          FAMILY HISTORY  Family History   Problem Relation Age of Onset    Dementia Father     Diabetes Father     Stroke Father     Hypertension Father     Prostate cancer Paternal Uncle        SOCIAL HISTORY  Social History     Socioeconomic History    Marital status:     Highest education level: Bachelor's degree (e.g., BA, AB, BS)   Tobacco Use    Smoking status: Never    Smokeless tobacco: Never   Vaping Use    Vaping status: Never Used   Substance and Sexual Activity    Alcohol use: No     Drug use: No    Sexual activity: Not Currently     Social Drivers of Health     Food Insecurity: Patient Declined (11/4/2023)    Hunger Vital Sign     Worried About Running Out of Food in the Last Year: Patient declined     Ran Out of Food in the Last Year: Patient declined   Transportation Needs: No Transportation Needs (11/4/2023)    PRAPARE - Transportation     Lack of Transportation (Medical): No     Lack of Transportation (Non-Medical): No   Physical Activity: Insufficiently Active (11/4/2023)    Exercise Vital Sign     Days of Exercise per Week: 4 days     Minutes of Exercise per Session: 10 min   Social Connections: Unknown (11/4/2023)    Social Connection and Isolation Panel [NHANES]     Frequency of Social Gatherings with Friends and Family: Patient declined     Attends Club or Organization Meetings: More than 4 times per year     Marital Status:     Received from [employer]    Intimate Partner Violence   Housing Stability: Unknown (11/4/2023)    Housing Stability Vital Sign     Unable to Pay for Housing in the Last Year: No     Unstable Housing in the Last Year: No        Occupation: pest control     CURRENT MEDICATIONS  Current Outpatient Medications   Medication Sig    vibegron (GEMTESA) 75 MG tablet Take 1 Tablet by mouth every day.    lansoprazole (PREVACID) 30 MG CAPSULE DELAYED RELEASE Take 1 Capsule by mouth every morning. Indications: Gastroesophageal Reflux Disease    levothyroxine (SYNTHROID) 137 MCG Tab Take 1 Tablet by mouth every morning on an empty stomach. THYROID    atorvastatin (LIPITOR) 10 MG Tab Take 1 Tablet by mouth every evening. CHOLESTEROL    pregabalin (LYRICA) 75 MG Cap Take 1 Capsule by mouth 2 times a day for 90 days. 90 DAY SUPPLY    alfuzosin (UROXATRAL) 10 MG SR tablet Take 1 Tablet by mouth every day.    Vitamin D, Cholecalciferol, 50 MCG (2000 UT) Cap 1 capsule every day by oral route.    Azelastine (ASTELIN) 137 MCG/SPRAY Solution ADMINISTER 2  "SPRAYS INTO EACH NOSTRIL TWICE A DAY    calcium citrate (CALCITRATE) 950 (200 Ca) MG Tab Take 1 Tablet by mouth every day.    Omega-3 Fatty Acids (FISH OIL) 1000 MG CAPS capsule Take 1 Capsule by mouth every morning.       REVIEW OF SYSTEMS  Constitutional: Denies fevers, Denies weight changes  Ears/Nose/Throat/Mouth: Denies nasal congestion or sore throat   Cardiovascular: Denies chest pain  Respiratory: Denies shortness of breath, Denies cough  Gastrointestinal/Hepatic: Denies nausea, vomiting  Sleep: see HPI    Physical Examination:  Vitals/ General Appearance:   Weight/BMI: Body mass index is 23.75 kg/m².  /72 (BP Location: Left arm, Patient Position: Sitting, BP Cuff Size: Adult)   Pulse 76   Resp 16   Ht 1.854 m (6' 1\")   Wt 81.6 kg (180 lb)   SpO2 95%   Vitals:    03/17/25 1054   BP: 122/72   BP Location: Left arm   Patient Position: Sitting   BP Cuff Size: Adult   Pulse: 76   Resp: 16   SpO2: 95%   Weight: 81.6 kg (180 lb)   Height: 1.854 m (6' 1\")       Pt. is alert and oriented to time, place and person. Cooperative and in no apparent distress.     Constitutional: Alert, no distress, well-groomed.  Skin: No rashes in visible areas.  Eye: Round. Conjunctiva clear, lids normal. No icterus.   ENT EXAM  Nasal alae/valves collapsible: No   Nasal septum deviation: No   Nasal turbinate hypertrophy: Left: Grade 2   Right: Grade 2  Hard palate narrow: No   Hard palate high: No   Soft palate/uvula (Mallampati score): 2  Tongue Scalloping: No   Retrognathia: No   Micrognathia: No   Cardiovascular:regular rate and rhythm  Pulmonary:Normal breath sounds, Clear to auscultation  Neurologic:Muscle tone normal, Awake, alert and oriented x 3  Extremities: No clubbing, cyanosis, or edema     Bicarb:   Lab Results   Component Value Date/Time    CO2 26 07/15/2024 0625    CO2 25 12/14/2023 0916    CO2 24 03/29/2022 0240     TSH:   Lab Results   Component Value Date/Time    TSHULTRASEN 0.513 07/15/2024 0625 "     CREATININE:   Lab Results   Component Value Date/Time    CREATININE 1.05 07/15/2024 0625     VIT D:   Lab Results   Component Value Date/Time    25HYDROXY 41 07/15/2024 0625     H/H:  Lab Results   Component Value Date/Time    HEMOGLOBIN 16.6 07/15/2024 06:25 AM       ASSESSMENT AND PLAN   1. Arsen Stroud  has symptoms of Obstructive Sleep Apnea (ELEAZAR) which interfere with activities of daily living.   - [place] Sleepiness Scale:17  - Patient reports snoring , excessive daytime sleepiness,  choking/gasping during sleep, witnessed apnea, and previous diagnosis of ELEAZAR.  - The pathophysiology of ELEAZAR and the increased risk of cardiovascular morbidity from untreated ELEAZAR has been discussed with the patient.   - We have discussed diagnostic options including in-laboratory, attended polysomnography and home sleep testing. We have also discussed treatment options including airway pressurization, weight management, and dental appliances, inspire.     Plan:  -  Order placed for PSG   - Follow up about 2 weeks after sleep study to discuss results and treatment options moving forward   - Advised to reach out via MyChart or by phone with any questions or concerns.   - The patient is urged to avoid supine sleep, weight gain and alcoholic beverages since all of these can worsen ELEAZAR. If the patient feels sleepy while driving, advised must pull over for a break/nap, rather than persist on the road, in the interest of patient's own safety and that of others on the road.    2.  Regarding treatment of other past medical problems and general health maintenance,  patient is urged to follow up with PCP.      Please note portions of this record was created using voice recognition software. I have made every reasonable attempt to correct obvious errors, but I expect that there are errors of grammar and possibly content I did not discover before finalizing the note.

## 2025-04-17 ENCOUNTER — APPOINTMENT (OUTPATIENT)
Dept: MEDICAL GROUP | Facility: PHYSICIAN GROUP | Age: 70
End: 2025-04-17
Payer: MEDICARE

## 2025-04-28 ENCOUNTER — APPOINTMENT (OUTPATIENT)
Dept: MEDICAL GROUP | Facility: PHYSICIAN GROUP | Age: 70
End: 2025-04-28
Payer: MEDICARE

## 2025-05-11 ENCOUNTER — APPOINTMENT (OUTPATIENT)
Dept: SLEEP MEDICINE | Facility: MEDICAL CENTER | Age: 70
End: 2025-05-11
Payer: MEDICARE

## 2025-05-15 ENCOUNTER — APPOINTMENT (OUTPATIENT)
Dept: MEDICAL GROUP | Facility: PHYSICIAN GROUP | Age: 70
End: 2025-05-15
Payer: MEDICARE

## 2025-05-19 ENCOUNTER — APPOINTMENT (OUTPATIENT)
Dept: MEDICAL GROUP | Facility: PHYSICIAN GROUP | Age: 70
End: 2025-05-19
Payer: MEDICARE

## 2025-05-22 ENCOUNTER — APPOINTMENT (OUTPATIENT)
Dept: SLEEP MEDICINE | Facility: MEDICAL CENTER | Age: 70
End: 2025-05-22
Payer: MEDICARE

## 2025-05-27 ENCOUNTER — HOSPITAL ENCOUNTER (OUTPATIENT)
Dept: RADIOLOGY | Facility: MEDICAL CENTER | Age: 70
End: 2025-05-27
Attending: PHYSICIAN ASSISTANT
Payer: MEDICARE

## 2025-05-27 ENCOUNTER — OFFICE VISIT (OUTPATIENT)
Dept: MEDICAL GROUP | Facility: PHYSICIAN GROUP | Age: 70
End: 2025-05-27
Payer: MEDICARE

## 2025-05-27 VITALS
SYSTOLIC BLOOD PRESSURE: 104 MMHG | HEART RATE: 76 BPM | WEIGHT: 179 LBS | OXYGEN SATURATION: 95 % | DIASTOLIC BLOOD PRESSURE: 58 MMHG | HEIGHT: 73 IN | BODY MASS INDEX: 23.72 KG/M2 | RESPIRATION RATE: 14 BRPM | TEMPERATURE: 98.7 F

## 2025-05-27 DIAGNOSIS — E03.9 ACQUIRED HYPOTHYROIDISM: Primary | ICD-10-CM

## 2025-05-27 DIAGNOSIS — R35.0 URINARY FREQUENCY: ICD-10-CM

## 2025-05-27 DIAGNOSIS — E55.9 VITAMIN D DEFICIENCY: ICD-10-CM

## 2025-05-27 DIAGNOSIS — E78.5 DYSLIPIDEMIA: ICD-10-CM

## 2025-05-27 DIAGNOSIS — K21.9 GASTROESOPHAGEAL REFLUX DISEASE WITHOUT ESOPHAGITIS: ICD-10-CM

## 2025-05-27 DIAGNOSIS — R05.3 CHRONIC COUGH: ICD-10-CM

## 2025-05-27 DIAGNOSIS — R73.03 PREDIABETES: ICD-10-CM

## 2025-05-27 DIAGNOSIS — Z93.2 ILEOSTOMY PRESENT (HCC): ICD-10-CM

## 2025-05-27 DIAGNOSIS — R53.83 FATIGUE, UNSPECIFIED TYPE: ICD-10-CM

## 2025-05-27 LAB
HBA1C MFR BLD: 5.8 % (ref ?–5.8)
POCT INT CON NEG: NEGATIVE
POCT INT CON POS: POSITIVE

## 2025-05-27 PROCEDURE — 3074F SYST BP LT 130 MM HG: CPT | Performed by: PHYSICIAN ASSISTANT

## 2025-05-27 PROCEDURE — 71046 X-RAY EXAM CHEST 2 VIEWS: CPT

## 2025-05-27 PROCEDURE — 83036 HEMOGLOBIN GLYCOSYLATED A1C: CPT | Performed by: PHYSICIAN ASSISTANT

## 2025-05-27 PROCEDURE — 99214 OFFICE O/P EST MOD 30 MIN: CPT | Performed by: PHYSICIAN ASSISTANT

## 2025-05-27 PROCEDURE — 3078F DIAST BP <80 MM HG: CPT | Performed by: PHYSICIAN ASSISTANT

## 2025-05-27 RX ORDER — PREGABALIN 75 MG/1
CAPSULE ORAL
COMMUNITY
Start: 2024-10-01

## 2025-05-27 ASSESSMENT — PATIENT HEALTH QUESTIONNAIRE - PHQ9: CLINICAL INTERPRETATION OF PHQ2 SCORE: 0

## 2025-05-27 ASSESSMENT — ENCOUNTER SYMPTOMS
COUGH: 1
FEVER: 0
CHILLS: 0
SHORTNESS OF BREATH: 0

## 2025-05-27 ASSESSMENT — FIBROSIS 4 INDEX: FIB4 SCORE: 0.99

## 2025-05-27 NOTE — ASSESSMENT & PLAN NOTE
Chronic, uncontrolled.  Patient reports this cough started 10+ years ago, prior to 2015.  Has had full ENT workup, surgeries, medications, all of which did not change his symptoms.  Has had a full pulmonology workup, prior to 2020, saying imaging looked good and did not need any pulmonology intervention.  Does report a DVT/PE years ago, in the same episode, nothing since.  Reports more productive cough over the last 2 months, since around March 2025 but does report primarily clear phlegm.  Denies fever, chills, body aches.  Does have slightly more shortness of breath with exertion.  Breath sounds are clear to auscultation bilaterally in clinic.  Repeating chest x-ray.  Declines Tessalon Perles.

## 2025-05-27 NOTE — ASSESSMENT & PLAN NOTE
Chronic, stable.     Latest Labs:   Lab Results   Component Value Date/Time    CHOLSTRLTOT 131 07/15/2024 06:25 AM    LDL 56 07/15/2024 06:25 AM    HDL 55 07/15/2024 06:25 AM    TRIGLYCERIDE 101 07/15/2024 06:25 AM        Medications: Tolerating/continue atorvastatin 10 mg daily.    Risk calculator: The 10-year ASCVD risk score (Geena ENRIQUEZ, et al., 2019) is: 10.9%

## 2025-05-27 NOTE — PROGRESS NOTES
SUBJECTIVE:     CC:     HPI:   Arsen presents today with the following:    ASSESSMENT & PLAN by Problem:     Problem   Prediabetes    New diagnosis.  A1C 6.0 (7/2024).  POC A1C: 5.8% (5/27/2025).     Dyslipidemia    Chronic, controlled.  Tolerating/continue Atorvastatin 10 mg daily.      Urinary Frequency    Chronic, uncontrolled.  Now managed by urology     Chronic Cough    Chronic, uncontrolled.  Negative workup from an ENT and pulmonology perspective.  Declines Tessalon Perles.  Chest x-ray ordered today given worsening phlegm over the last 2 months.  Discussed this likely needs symptom management only.  Could try a daily inhaler but question if this would help as pulmonology did not try this.  Vitals and exam are unremarkable.       Ileostomy Present (Hcc)    Chronic, stable.  Has been getting his supplies for years without issue/needing a prescription.    Interval history:  history of ulcerative colitis s/p total proctocolectomy with end ileostomy.  Entire large intestine was removed.     Acquired Hypothyroidism    Chronic, stable.   Tolerating/continue levothyroxine 137 mcg daily.     Gastroesophageal Reflux Disease Without Esophagitis    Chronic, stable.  Tolerating lansoprazole 30 mg most days.        Urology: PSA 3.72 (10/2024, ordered by urology)    POC A1c: 5.8%    Repeat labs early September/October 2025.    Return in about 5 months (around 10/27/2025) for lab discussion.       HPI:     Problem List Items Addressed This Visit       Acquired hypothyroidism - Primary    Chronic, stable.   Tolerating/continue levothyroxine 137 mcg daily.    Interval history:   Endorses significant amount of fatigue and falling asleep while sitting in the chair during the day.         Chronic cough    Chronic, uncontrolled.  Patient reports this cough started 10+ years ago, prior to 2015.  Has had full ENT workup, surgeries, medications, all of which did not change his symptoms.  Has had a full pulmonology workup, prior to  "2020, saying imaging looked good and did not need any pulmonology intervention.  Does report a DVT/PE years ago, in the same episode, nothing since.  Reports more productive cough over the last 2 months, since around March 2025 but does report primarily clear phlegm.  Denies fever, chills, body aches.  Does have slightly more shortness of breath with exertion.  Breath sounds are clear to auscultation bilaterally in clinic.  Repeating chest x-ray.  Declines Tessalon Perles.           Relevant Orders    DX-CHEST-2 VIEWS    Dyslipidemia    Chronic, stable.     Latest Labs:   Lab Results   Component Value Date/Time    CHOLSTRLTOT 131 07/15/2024 06:25 AM    LDL 56 07/15/2024 06:25 AM    HDL 55 07/15/2024 06:25 AM    TRIGLYCERIDE 101 07/15/2024 06:25 AM        Medications: Tolerating/continue atorvastatin 10 mg daily.    Risk calculator: The 10-year ASCVD risk score (Geena ENRIQUEZ, et al., 2019) is: 10.9%            Relevant Orders    Lipid Profile    Gastroesophageal reflux disease without esophagitis    Chronic, stable.  Tolerating lansoprazole 30 mg most days.  He sometimes takes it every other day unless his symptoms worsen.  History of dilatation x3, most recently around 2008.  No longer sees GI.         Ileostomy present (HCC)    Prediabetes    Relevant Orders    HEMOGLOBIN A1C    POCT  A1C (Completed)    Urinary frequency     Other Visit Diagnoses         Vitamin D deficiency        Relevant Orders    VITAMIN D,25 HYDROXY (DEFICIENCY)      Fatigue, unspecified type        Relevant Orders    CBC WITH DIFFERENTIAL    Comp Metabolic Panel    TSH                   ROS:  Review of Systems   Constitutional:  Negative for chills and fever.   Respiratory:  Positive for cough. Negative for shortness of breath.    Cardiovascular:  Negative for chest pain.       OBJECTIVE:     Exam:  /58   Pulse 76   Temp 37.1 °C (98.7 °F) (Temporal)   Resp 14   Ht 1.854 m (6' 1\")   Wt 81.2 kg (179 lb)   SpO2 95%   BMI 23.62 kg/m²  " Body mass index is 23.62 kg/m².    Physical Exam  Vitals reviewed.   Constitutional:       General: He is not in acute distress.     Appearance: Normal appearance.   HENT:      Head: Normocephalic.   Eyes:      Conjunctiva/sclera: Conjunctivae normal.   Cardiovascular:      Rate and Rhythm: Normal rate and regular rhythm.      Pulses: Normal pulses.      Heart sounds: No murmur heard.     No gallop.   Pulmonary:      Effort: Pulmonary effort is normal. No respiratory distress.      Breath sounds: No wheezing.   Musculoskeletal:         General: No swelling.   Skin:     General: Skin is warm and dry.   Neurological:      General: No focal deficit present.      Mental Status: He is alert.   Psychiatric:         Mood and Affect: Mood normal.         Behavior: Behavior normal.         Judgment: Judgment normal.           CHART REVIEW:       1.  Large right main pulmonary embolus extending into the right lower and middle lobe segmental and subsegmental branches.  2.  Patchy right lung base opacity, consider infiltrate, could represent changes related to pulmonary infarct.  3.  Atherosclerosis.      Prior study done 12/30/2019. Compared to the report of the study done -   there has been no significant change.   Normal left ventricular systolic function.  Left ventricular ejection fraction is visually estimated to be 65%.  The right ventricle was normal in size and function.  No significant valve abnormalities.   Estimated right ventricular systolic pressure is 20 mmHg.              Please note that this dictation was created using voice recognition software. I have made every reasonable attempt to correct obvious errors, but I expect that there are errors of grammar and possibly content that I did not discover before finalizing the note.

## 2025-05-29 ENCOUNTER — RESULTS FOLLOW-UP (OUTPATIENT)
Dept: MEDICAL GROUP | Facility: PHYSICIAN GROUP | Age: 70
End: 2025-05-29

## 2025-06-12 ENCOUNTER — APPOINTMENT (OUTPATIENT)
Dept: SLEEP MEDICINE | Facility: MEDICAL CENTER | Age: 70
End: 2025-06-12
Payer: MEDICARE

## 2025-06-12 DIAGNOSIS — R06.81 WITNESSED EPISODE OF APNEA: ICD-10-CM

## 2025-06-12 DIAGNOSIS — G47.33 OSA (OBSTRUCTIVE SLEEP APNEA): ICD-10-CM

## 2025-06-12 DIAGNOSIS — G47.19 EXCESSIVE DAYTIME SLEEPINESS: ICD-10-CM

## 2025-06-12 DIAGNOSIS — G47.34 NOCTURNAL HYPOXIA: ICD-10-CM

## 2025-06-12 DIAGNOSIS — R06.83 SNORING: ICD-10-CM

## 2025-06-12 PROCEDURE — 95811 POLYSOM 6/>YRS CPAP 4/> PARM: CPT | Mod: 26 | Performed by: STUDENT IN AN ORGANIZED HEALTH CARE EDUCATION/TRAINING PROGRAM

## 2025-06-13 NOTE — PROCEDURES
Patient: ANAMARIA CORDOVA   ID: [id number] Date: 6/12/2025 Exam No.:            MONTAGE: Standard      STUDY TYPE: Split Night           RECORDING TECHNIQUE:       After the scalp was prepared, gold plated electrodes were applied to the scalp according to the International 10-20 System. EEG (electroencephalogram) was continuously monitored from the O1-M2, O2-M1, C3-M2, C4-M1, F3-M2, and F4-M1. EOGs (electrooculograms) were monitored by electrodes placed at the left and right outer canthi.  Chin EMG (electromyogram) was monitored by electrodes placed on the mentalis and sub-mentalis muscles.  Nasal and oral airflow were monitored using a triple port thermocouple as well as oronasal pressure transducer.  Respiratory effort was measured by inductive plethysmography technology employing abdominal and thoracic belts.  Blood oxygen saturation and pulse were monitored by pulse oximetry.  Heart rhythm was monitored by surface electrocardiogram.  Leg EMG was studied using surface electrodes placed on left and right anterior tibialis.  A microphone was used to monitor tracheal sounds and snoring.  Body position was monitored and documented by technician observation.            SCORING CRITERIA:       A modification of the AASM manual for scoring of sleep and associated events was used. Obstructive apneas were scored by cessation of airflow for at least 10 seconds with continuing respiratory effort. Central apneas were scored by cessation of airflow for at least 10 seconds with no respiratory effort.  Hypopneas were scored by a 30% or more reduction in airflow for at least 10 seconds accompanied by arterial oxygen desaturation of 3% or an arousal. For CMS (Medicare) patients, per AASM rule 1B, hypopneas are scored by 30% with mild reduction in airflow for at least 10 seconds accompanied by arterial saturation decreased at 4%.           DIAGNOSTIC     Study start time was 08:46:55 PM.  Diagnostic recording time was 187 minutes with  a total sleep time of 165 minutes resulting in a sleep efficiency of 88.24%%.  Sleep latency from the start of the study was 09 minutes and the latency from sleep to REM was 99 minutes. In total,59  arousals were scored for an arousal index of 21.5.     Respiratory:     There were a total of 0 apneas consisting of 0 obstructive apneas, 0 mixed apneas, and 0 central apneas.  A total of 16 hypopneas were scored. The apnea index was 0.00 per hour and the hypopnea index was 5.82 per hour resulting in an overall 4% AHI of 5.82. AHI during REM was 0.0 and AHI while supine was 24.30.     Oximetry:     There was a mean oxygen saturation of 91.0%.  The minimum oxygen saturation during NREM sleep was 83.0% and in REM was 87.0%.  Time spent during sleep with oxygen saturations <88% was 13.9 minutes.      Cardiac:     The highest heart rate seen while awake was 85 BPM while the highest heart rate during sleep was 80 BPM with an average sleeping heart rate of 60 BPM.     Limb Movements:     There were a total of 0 PLMs during sleep, which resulted in a PLM index of 0.0.  There were 1 PLMs associated with arousals which resulted in a PLMS arousal index of 0.4.           TREATMENT:     Treatment recording time was 6h 15.5m (375 minutes) with a total sleep time of 3h 58.0m (238 minutes) resulting in a sleep efficiency of 63.4%.   Sleep latency from the start of treatment was 04 minutes and REM latency from sleep onset was 4h 1.0m.  The patient had 197 arousals in total for an arousal index of 49.7.     Respiratory:      There were 2 apneas in total consisting of  0 obstructive apneas, 2 central apneas, and 0 mixed apneas for an apnea index of 0.50.  The patient had 3 hypopneas in total, which resulted in a hypopnea index of 0.76.  The overall AHI was 1.26, with a REM AHI of 2.73, and a supine AHI of 18.00.        Oximetry:     The mean SaO2 during treatment was 93.0%.   The minimum oxygen saturation in NREM was 87.0 % and in REM  was 89.0%.  Patient spent 0.2 minutes of TST with SaO2 <88%.     Cardiac:     The highest heart rate during sleep was 72 BPM with an average sleeping heart rate of 58BPM.     Limb Movements:     There were a total of 0 PLMS during titration sleep time that resulted in an index of 0.0.  There were 0 PLMS associated with arousals.  This resulted in a PLM arousal index of 0.0.     Titration:      CPAP was tried from 5 to 8, BiPAP was tried at 8/4     This was a fully attended sleep study. This test was technically adequate. This patient was titrated on CPAP starting at 5 cm of water pressure. Patient was titrated up to BiPAP 8/4 cm of water pressure. Patient did best at CPAP 8 cm of water pressure. Patient spent 92 minutes at that pressure and the AHI was 1.3 which is considered controlled obstructive sleep apnea.        Impression:  1.  Mild obstructive sleep apnea with an overall 4% AHI of 5.8 events an hour  2.  Mild nocturnal hypoxia likely secondary to uncontrolled sleep apnea with time at or below 88% saturation of 14 minutes  3.  Respiratory events worse in supine position  4.  Likely positional component to sleep apnea  5.  Patient was placed on CPAP and BiPAP which did improve respiratory events    Recommendations:  I recommend auto CPAP 5-8 cmH2O.  Patient used Medium F20 mask during night of study.    I also recommend 30 day compliance download to assess the efficacy to the recommended pressure, measure leak, apnea hypopnea index and compliance for further outpatient monitoring and management of PAP therapy. In some cases alternative treatment options may be proven effective in resolving sleep apnea. These options include upper airway surgery, the use of a dental orthotic, weight loss, or positional therapy. Clinical correlation is required. In general patients with sleep apnea are advised to avoid alcohol, sedatives and not to operate a motor vehicle while drowsy.  Untreated sleep apnea increases the risk  for cardiovascular and neurovascular disease.

## 2025-06-26 ENCOUNTER — OFFICE VISIT (OUTPATIENT)
Dept: MEDICAL GROUP | Facility: PHYSICIAN GROUP | Age: 70
End: 2025-06-26
Payer: MEDICARE

## 2025-06-26 VITALS
WEIGHT: 178.25 LBS | BODY MASS INDEX: 22.88 KG/M2 | RESPIRATION RATE: 18 BRPM | SYSTOLIC BLOOD PRESSURE: 92 MMHG | HEIGHT: 74 IN | TEMPERATURE: 97.8 F | HEART RATE: 60 BPM | OXYGEN SATURATION: 96 % | DIASTOLIC BLOOD PRESSURE: 60 MMHG

## 2025-06-26 DIAGNOSIS — R05.3 CHRONIC COUGH: Primary | ICD-10-CM

## 2025-06-26 DIAGNOSIS — J06.9 UPPER RESPIRATORY TRACT INFECTION, UNSPECIFIED TYPE: ICD-10-CM

## 2025-06-26 PROCEDURE — 3078F DIAST BP <80 MM HG: CPT

## 2025-06-26 PROCEDURE — 99213 OFFICE O/P EST LOW 20 MIN: CPT

## 2025-06-26 PROCEDURE — 3074F SYST BP LT 130 MM HG: CPT

## 2025-06-26 RX ORDER — AZITHROMYCIN 250 MG/1
TABLET, FILM COATED ORAL
Qty: 6 TABLET | Refills: 0 | Status: SHIPPED | OUTPATIENT
Start: 2025-06-26

## 2025-06-26 RX ORDER — AZITHROMYCIN 250 MG/1
TABLET, FILM COATED ORAL
Qty: 6 TABLET | Refills: 0 | Status: SHIPPED | OUTPATIENT
Start: 2025-06-26 | End: 2025-06-26

## 2025-06-26 ASSESSMENT — FIBROSIS 4 INDEX: FIB4 SCORE: 0.99

## 2025-06-26 NOTE — PROGRESS NOTES
Subjective:     Chief Complaint   Patient presents with    Sinusitis     History of Present Illness  The patient is a 70-year-old male presenting for sinus infection symptoms for the last week.    He reports persistent sinus issues, characterized by the expulsion of blackish-tinted mucus from both nostrils, which he suspects may contain dried blood. His wife suspects a lingering sinus infection and has suggested antibiotic treatment. He has not had many sinus infections in the past. His symptoms began approximately one week ago, following an illness in his granddaughters. Despite his wife and granddaughters recovering, his symptoms have persisted. He experienced feverish sensations but no actual fever or chills. He also reports nasal pressure, headaches, and flu-like symptoms that lasted for two days before subsiding. He continues to experience congestion and a dry throat. He does not report any ear pain, pressure, jaw pain, or tooth pain. He had a sore throat during his flu-like symptoms and severe body aches and muscle aches. His sinus symptoms have been fluctuating, with periods of relief followed by recurrence overnight. He reports nonproductive mornings, but upon standing and moving around, he needs to blow his nose, which initially produces clear mucus that later turns yellow or green. He has not used any over-the-counter medications, Flonase nasal sprays, or saline rinses. He does not use a humidifier at night. He has not been treated with antibiotics for his cough. He was prescribed azelastine nasal spray by an ENT specialist, which did not alleviate his symptoms. He was also given a medication in pill form, which he does not recall, but it caused dryness throughout his body. He underwent a saline lavage procedure performed by an ENT specialist.    He has been under the care of Caitlin Parmar PA-C for a persistent cough for the past 6 months to a year. He suspects a lung-related issue and possibly a  "pulmonary embolism, although he has no history of such. He reports no wheezing. He has a history of pulmonary embolism, which was diagnosed following an episode of leg swelling and severe side pain. He was treated with blood thinners but is not currently on them. He does not take low-dose aspirin. A recent x-ray ordered by Caitlin did not reveal any abnormalities. He has a history of acid reflux and was taking lansoprazole 80 mg daily, which he reduced to half the dose before discontinuing it due to concerns about it causing his cough. He reports that his cough has improved since stopping the medication. He has a history of tonsillectomy. He underwent a pulmonary function test, which did not reveal any significant findings. He also underwent a sleep study, which did not show any significant findings. He was recommended to use a CPAP machine, which he had used 10 years ago but discontinued due to discomfort with the facemask.    PAST SURGICAL HISTORY:  Tonsillectomy  Saline lavage procedure  Pulmonary embolism treatment    Allergies: Sulfa drugs and Pcn [penicillins]  ROS per HPI  Health Maintenance: Deferred     Objective:     BP 92/60 (BP Location: Left arm, Patient Position: Sitting, BP Cuff Size: Adult)   Pulse 60   Temp 36.6 °C (97.8 °F) (Temporal)   Resp 18   Ht 1.88 m (6' 2\")   Wt 80.9 kg (178 lb 4 oz)   SpO2 96%   BMI 22.89 kg/m²  Body mass index is 22.89 kg/m².     Physical Exam  Vitals reviewed.   Constitutional:       General: He is not in acute distress.     Appearance: Normal appearance. He is not ill-appearing.   Cardiovascular:      Rate and Rhythm: Normal rate and regular rhythm.   Pulmonary:      Effort: Pulmonary effort is normal. No respiratory distress.   Skin:     Coloration: Skin is not jaundiced or pale.   Neurological:      General: No focal deficit present.      Mental Status: He is alert and oriented to person, place, and time.   Psychiatric:         Mood and Affect: Mood normal.       "   Behavior: Behavior normal.         Thought Content: Thought content normal.         Judgment: Judgment normal.        Results  Imaging   - Chest x-ray: No abnormalities    Results for orders placed or performed in visit on 05/27/25   POCT  A1C    Collection Time: 05/27/25 10:29 AM   Result Value Ref Range    Glycohemoglobin 5.8 <=5.8 %    Internal Control Positive Positive     Internal Control Negative Negative       Assessment and Plan:     The following treatment plan was discussed through shared decision making with the patient:    1. Chronic cough  azithromycin (ZITHROMAX Z-STUART) 250 MG Tab    DISCONTINUED: azithromycin (ZITHROMAX Z-STUART) 250 MG Tab      2. Upper respiratory tract infection, unspecified type  azithromycin (ZITHROMAX Z-STUART) 250 MG Tab    DISCONTINUED: azithromycin (ZITHROMAX Z-STUART) 250 MG Tab        Assessment & Plan  1. Sinusitis.  - Symptoms include nasal congestion, postnasal drip, and thick mucus production with a blackish tint, possibly indicating dried blood.  - Physical exam reveals mild inflammation on the left side of the nasal passages; lungs and heart sound normal.  - Discussed the use of saline nasal spray or rinses to moisten nasal passages and clear congestion, and the use of a humidifier at night.  - Prescribed a Z-Stuart (azithromycin) for a 5-day course to address potential bacterial infection; advised to take 2 tablets on the first day and 1 tablet daily for the subsequent 4 days.    2. Chronic cough.  - Persistent cough for 6 months to a year, possibly related to postnasal drip or residual symptoms from a recent viral infection.  - Recent chest x-ray was clear; history of pulmonary embolism but not currently on blood thinners.  - Discussed the possibility of the cough being related to acid reflux and the use of omeprazole or other medications to manage symptoms.  - Advised to follow up with Caitlin Parmar PA-C in the next couple of months to discuss further management; pulmonary  function test may be considered if the cough persists.    Return if symptoms worsen or fail to improve.       Please note that this note was created using dictation with voice recognition software. I have made every reasonable attempt to correct obvious errors, but I expect that there are errors of grammar and possibly content that I did not discover before finalizing the note.    SHARITA Jessica  [facility]  [facility]

## 2025-07-07 ENCOUNTER — OFFICE VISIT (OUTPATIENT)
Dept: SLEEP MEDICINE | Facility: MEDICAL CENTER | Age: 70
End: 2025-07-07
Payer: MEDICARE

## 2025-07-07 VITALS
SYSTOLIC BLOOD PRESSURE: 104 MMHG | HEART RATE: 74 BPM | DIASTOLIC BLOOD PRESSURE: 68 MMHG | OXYGEN SATURATION: 93 % | WEIGHT: 180 LBS | BODY MASS INDEX: 23.1 KG/M2 | HEIGHT: 74 IN | RESPIRATION RATE: 16 BRPM

## 2025-07-07 DIAGNOSIS — G47.33 OSA (OBSTRUCTIVE SLEEP APNEA): Primary | ICD-10-CM

## 2025-07-07 PROCEDURE — 99213 OFFICE O/P EST LOW 20 MIN: CPT

## 2025-07-07 PROCEDURE — 3074F SYST BP LT 130 MM HG: CPT

## 2025-07-07 PROCEDURE — 3078F DIAST BP <80 MM HG: CPT

## 2025-07-07 ASSESSMENT — FIBROSIS 4 INDEX: FIB4 SCORE: 0.99

## 2025-07-07 NOTE — PROGRESS NOTES
[facility] Follow-up Visit    CC:   Chief Complaint   Patient presents with    Follow-Up     Last Office Visit 3/17/2025 with Dr. Still     Results     Study Complete on 6/12/2025          HPI:  Arsen Stroud is a 70 y.o.male present today to discuss sleep study results. Patient was seen by sleep medicine 3/17/25 by myself for initial consultation. Patient had a diagnosis of ELEAZAR 10-15 years ago and was briefly on CPAP therapy but stopped due to aerophagia and difficulty tolerating. Patient had repeat testing 11/15/24 which showed 4% AHI 4.2 and was recommended to go for in lab study due to burden of symptoms and potential for underestimation of AHI on home testing. Patient completed in lab split night study which showed  AHI 5.82 with oxygen saturations <88% was 13.9 minutes. CPAP was tried from 5 to 8, BiPAP was tried at 8/4. Recommendations were made for autoCPAP 5-8. During the study patient was on a nasal style mask and then was tried on a chin strap and later switched to a FFM. Patient would like to try a nasal style mask because he felt that the FFM mask was too much.       Sleep History:  11/15/24 - HST pAHI 4% 4.2, pRDI 8.3, mean O2 sat was 90%,  saida was 73%,  and maximum O2 at 95 %. O2 sat was at or  below 88% for 11.2 min of evaluation time, Rec: Given evidence of borderline ELEAZAR and nocturnal hypoxemia, advise in-lab PSG for comprehensive assessment especially if patient remains symptomatic  6/12/25 - PSG split night, 4% AHI 5.82, Time spent during sleep with oxygen saturations <88% was 13.9 minutes. CPAP was tried from 5 to 8, BiPAP was tried at 8/4. Patient spent 0.2 minutes of TST with SaO2 <88% during treatment portion. 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    ELEAZAR (obstructive sleep apnea) 11/06/2023    Dyslipidemia 11/06/2023    Urinary frequency 11/06/2023    Chronic cough  04/11/2023    Parastomal hernia 01/05/2023    Ileostomy present (HCC) 03/30/2022    DNR no code (do not resuscitate) 06/22/2020    Acquired hypothyroidism 12/16/2019    Gastroesophageal reflux disease without esophagitis 03/16/2018       Past Medical History[1]     Past Surgical History[2]    Family History   Problem Relation Age of Onset    Dementia Father     Diabetes Father     Stroke Father     Hypertension Father     Prostate cancer Paternal Uncle        Social History     Socioeconomic History    Marital status:      Spouse name: Not on file    Number of children: Not on file    Years of education: Not on file    Highest education level: Bachelor's degree (e.g., BA, AB, BS)   Occupational History    Not on file   Tobacco Use    Smoking status: Never    Smokeless tobacco: Never   Vaping Use    Vaping status: Never Used   Substance and Sexual Activity    Alcohol use: No    Drug use: No    Sexual activity: Not Currently   Other Topics Concern    Not on file   Social History Narrative    Not on file     Social Drivers of Health     Financial Resource Strain: Not on file   Food Insecurity: Patient Declined (11/4/2023)    Hunger Vital Sign     Worried About Running Out of Food in the Last Year: Patient declined     Ran Out of Food in the Last Year: Patient declined   Transportation Needs: No Transportation Needs (11/4/2023)    PRAPARE - Transportation     Lack of Transportation (Medical): No     Lack of Transportation (Non-Medical): No   Physical Activity: Insufficiently Active (11/4/2023)    Exercise Vital Sign     Days of Exercise per Week: 4 days     Minutes of Exercise per Session: 10 min   Stress: Not on file (2/10/2021)   Social Connections: Unknown (11/4/2023)    Social Connection and Isolation Panel [NHANES]     Frequency of Communication with Friends and Family: Not on file     Frequency of Social Gatherings with Friends and Family: Patient declined     Attends [employer] Services: Not on file      "Active Member of Clubs or Organizations: Not on file     Attends Club or Organization Meetings: More than 4 times per year     Marital Status:    Intimate Partner Violence: Low Risk  (4/12/2021)    Received from [facility]    Intimate Partner Violence     Insults You: Not on file     Threatens You: Not on file     Screams at You: Not on file     Physically Hurt: Not on file     Intimate Partner Violence Score: Not on file   Housing Stability: Unknown (11/4/2023)    Housing Stability Vital Sign     Unable to Pay for Housing in the Last Year: No     Number of Places Lived in the Last Year: Not on file     Unstable Housing in the Last Year: No       Current Medications[3]     ALLERGIES: Sulfa drugs and Pcn [penicillins]    ROS  Constitutional: Denies fevers, Denies weight changes  Ears/Nose/Throat/Mouth: Denies nasal congestion or sore throat   Cardiovascular: Denies chest pain  Respiratory: Denies shortness of breath, Denies cough  Gastrointestinal/Hepatic: Denies nausea, vomiting  Sleep: see HPI      PHYSICAL EXAM  /68 (BP Location: Left arm, Patient Position: Sitting, BP Cuff Size: Adult)   Pulse 74   Resp 16   Ht 1.88 m (6' 2\") Comment: per patient  Wt 81.6 kg (180 lb) Comment: per patient  SpO2 93%   BMI 23.11 kg/m²   Constitutional: Alert, no distress, well-groomed.  Skin: Warm, dry, good turgor, no rashes in visible areas.  Eye: Equal, round and reactive, conjunctiva clear, lids normal.  ENMT: Lips without lesions, good dentition, moist mucous membranes.  Neck: Trachea midline, no masses, no thyromegaly.  Respiratory: Unlabored respiratory effort, no cough.  MSK: Normal gait, moves all extremities.  Neuro: Grossly non-focal.   Psych: Alert and oriented x3, normal affect and mood.      Medical Decision Making   Assessment and Plan  The medical record was reviewed.    Obstructive sleep apnea  - Diagnostic and titration nocturnal polysomnogram's  and home sleep apnea tests reviewed. Based on " "results, it is recommended that patient start autoCPAP. Patient is agreeable. Discussed trial of lip tape with nasal style mask if needed since chin strap during study did not seem helpful to patient.  - Patients with ELEAZAR are at increased risk of cardiovascular disease including coronary artery disease, systemic arterial hypertension, pulmonary arterial hypertension, cardiac arrythmias, and stroke. Positive airway pressure will favorably impact many of the adverse conditions and effects provoked by ELEAZAR. Avoid driving a motor vehicle when drowsy  - Order placed for mask and supplies and new machine   - Clean equipment frequently, and get new mask and supplies as allowed by insurance and DME. Advised to let DME company know in the first 30 days if any issues with mask fit arise so that new mask can be tried.   - Discussed compliance requirements for insurance purposes as well as ultimate clinical goal of wearing and tolerating PAP device nightly for full night of sleep to achieve optimal symptomatic and prophylactic benefit.   - Advised to reach out via [facility] with questions       Return in about 3 months (around 10/7/2025) for initial compliance after using machine at least 30 days .   - Recommend an earlier appointment, if significant treatment barriers develop.  - Patient to follow up with the appropriate healthcare practitioners for all other medical problems and issues.    Please note portions of this record was created using voice recognition software. I have made every reasonable attempt to correct obvious errors, but I expect that there are errors of grammar and possibly content I did not discover before finalizing the note.           [1]   Past Medical History:  Diagnosis Date    Acute pulmonary embolism without acute cor pulmonale (HCC) 06/11/2020    Anemia     Apnea, sleep     Arrhythmia     reports occasional \"Fluttering\", states work up neg.     Arthritis     knees    Blood transfusion without reported " diagnosis     Chickenpox     Chronic anticoagulation 06/22/2020    Cough     Daytime sleepiness     Difficulty swallowing     Dizziness     Fatigue     Frequent urination     Gasping for breath     GERD (gastroesophageal reflux disease)     [place] measles     GIB (gastrointestinal bleeding) 06/22/2020    Hearing difficulty     Heartburn     Hiatus hernia syndrome     High cholesterol     Hoarseness, persistent     Hypothyroid     Hypothyroidism     Influenza     Insomnia     Morning headache     Mumps     Nasal drainage     Painful joint     Palpitations 12/16/2019    Pneumonia     Pulmonary emboli (HCC)     Pulmonary embolism (HCC)     Rhinitis     Ringing in ears     Skin change     Sleep apnea     had a sleep study. doesn't use cpap    Snoring     Sore muscles     Sore throat, chronic     Sputum production     Swelling of lower extremity     Tear of medial cartilage or meniscus of knee, current 02/12/2015    Tonsillitis     Ulcerative colitis (HCC)     Vision loss     Wears glasses    [2]   Past Surgical History:  Procedure Laterality Date    KNEE ARTHROSCOPY  02/12/2015    Performed by Greg Chaves M.D. at SURGERY [facility]    MENISCECTOMY, KNEE, MEDIAL  02/12/2015    Performed by Greg Chaves M.D. at SURGERY [facility]    KNEE ARTHROPLASTY TOTAL  01/01/2013    Knee Replacement, Total, left    FLAP GRAFT  06/20/2012    Performed by GREG NAGY at SURGERY [facility]    FULL THICKNESS SKIN GRAFT  06/20/2012    Performed by GREG NAGY at SURGERY [facility]    PIN INSERTION  05/04/2012    Performed by GREG NAGY at SURGERY [facility]    NERVE REPAIR  05/04/2012    Performed by GREG NAGY at SURGERY [facility]    ARTERY EXPLORATION OR REPAIR  05/04/2012    Performed by GREG NAGY at SURGERY [facility]    LIGAMENT REPAIR  05/04/2012    Performed by GREG NAGY at SURGERY [facility]    ARTHROSCOPY, KNEE      CHOLECYSTECTOMY       COLON RESECTION      EYE SURGERY      HAND SURGERY      Sevored finger    HERNIA REPAIR      ORIF, KNEE      Both knees - joint replacement    SHOULDER SURGERY      Both shoulders - Rotar cuff orthoscopic repair and clean    SINUSCOPE      TONSILLECTOMY     [3]   Current Outpatient Medications   Medication Sig Dispense Refill    azithromycin (ZITHROMAX Z-STUART) 250 MG Tab Take 2 tablets on day 1, then take 1 tablet on days 2-5 6 Tablet 0    pregabalin (LYRICA) 75 MG Cap       lansoprazole (PREVACID) 30 MG CAPSULE DELAYED RELEASE Take 1 Capsule by mouth every morning. Indications: Gastroesophageal Reflux Disease 90 Capsule 1    levothyroxine (SYNTHROID) 137 MCG Tab Take 1 Tablet by mouth every morning on an empty stomach. THYROID 90 Tablet 1    atorvastatin (LIPITOR) 10 MG Tab Take 1 Tablet by mouth every evening. CHOLESTEROL 90 Tablet 1    alfuzosin (UROXATRAL) 10 MG SR tablet Take 1 Tablet by mouth every day. 30 Tablet 11    Vitamin D, Cholecalciferol, 50 MCG (2000 UT) Cap 1 capsule every day by oral route.      Azelastine (ASTELIN) 137 MCG/SPRAY Solution ADMINISTER 2 SPRAYS INTO EACH NOSTRIL TWICE A DAY      calcium citrate (CALCITRATE) 950 (200 Ca) MG Tab Take 1 Tablet by mouth every day.      Omega-3 Fatty Acids (FISH OIL) 1000 MG CAPS capsule Take 1 Capsule by mouth every morning.       No current facility-administered medications for this visit.

## 2025-07-21 DIAGNOSIS — E03.9 ACQUIRED HYPOTHYROIDISM: ICD-10-CM

## 2025-07-22 RX ORDER — LEVOTHYROXINE SODIUM 137 UG/1
137 TABLET ORAL
Qty: 90 TABLET | Refills: 1 | Status: SHIPPED | OUTPATIENT
Start: 2025-07-22

## 2025-08-25 DIAGNOSIS — G62.89 OTHER POLYNEUROPATHY: Primary | ICD-10-CM

## 2025-08-26 RX ORDER — PREGABALIN 75 MG/1
75 CAPSULE ORAL 2 TIMES DAILY
Qty: 180 CAPSULE | Refills: 0 | Status: SHIPPED | OUTPATIENT
Start: 2025-08-26 | End: 2025-11-24